# Patient Record
Sex: FEMALE | Race: WHITE | NOT HISPANIC OR LATINO | Employment: PART TIME | URBAN - METROPOLITAN AREA
[De-identification: names, ages, dates, MRNs, and addresses within clinical notes are randomized per-mention and may not be internally consistent; named-entity substitution may affect disease eponyms.]

---

## 2017-01-17 ENCOUNTER — OFFICE VISIT (OUTPATIENT)
Dept: FAMILY MEDICINE | Facility: CLINIC | Age: 62
End: 2017-01-17
Payer: COMMERCIAL

## 2017-01-17 VITALS
DIASTOLIC BLOOD PRESSURE: 72 MMHG | SYSTOLIC BLOOD PRESSURE: 126 MMHG | HEIGHT: 61 IN | HEART RATE: 72 BPM | TEMPERATURE: 98.6 F | BODY MASS INDEX: 45.12 KG/M2 | WEIGHT: 239 LBS

## 2017-01-17 DIAGNOSIS — Z12.11 SCREEN FOR COLON CANCER: ICD-10-CM

## 2017-01-17 DIAGNOSIS — Z12.31 VISIT FOR SCREENING MAMMOGRAM: ICD-10-CM

## 2017-01-17 DIAGNOSIS — Z23 NEED FOR SHINGLES VACCINE: ICD-10-CM

## 2017-01-17 DIAGNOSIS — H93.92 PROBLEM OF LEFT EAR: Primary | ICD-10-CM

## 2017-01-17 DIAGNOSIS — Z11.59 NEED FOR HEPATITIS C SCREENING TEST: ICD-10-CM

## 2017-01-17 DIAGNOSIS — Z23 NEED FOR PROPHYLACTIC VACCINATION AND INOCULATION AGAINST INFLUENZA: ICD-10-CM

## 2017-01-17 PROCEDURE — 90686 IIV4 VACC NO PRSV 0.5 ML IM: CPT | Performed by: PHYSICIAN ASSISTANT

## 2017-01-17 PROCEDURE — 99213 OFFICE O/P EST LOW 20 MIN: CPT | Mod: 25 | Performed by: PHYSICIAN ASSISTANT

## 2017-01-17 PROCEDURE — 90471 IMMUNIZATION ADMIN: CPT | Performed by: PHYSICIAN ASSISTANT

## 2017-01-17 PROCEDURE — 86803 HEPATITIS C AB TEST: CPT | Performed by: PHYSICIAN ASSISTANT

## 2017-01-17 PROCEDURE — 36415 COLL VENOUS BLD VENIPUNCTURE: CPT | Performed by: PHYSICIAN ASSISTANT

## 2017-01-17 RX ORDER — FLUTICASONE PROPIONATE 50 MCG
1-2 SPRAY, SUSPENSION (ML) NASAL DAILY
Qty: 1 BOTTLE | Refills: 11 | Status: ON HOLD | OUTPATIENT
Start: 2017-01-17 | End: 2019-07-09

## 2017-01-17 NOTE — PATIENT INSTRUCTIONS
Mammogram as scheduled.  Call to schedule Colonoscopy.  Florecita or her team will be in touch with you with results.  Flu vaccine and shingles vaccine today.    1.  Nasal steroid 2 sprays each nostril 1 time daily  2.  Nasal saline irrigation 3 times per day  3.  Increase your fluids  4.  Steam inhalation 3-4 times per day may also be helpful.    GOPI Dewey, PA-C

## 2017-01-17 NOTE — PROGRESS NOTES
Injectable Influenza Immunization Documentation    1.  Is the person to be vaccinated sick today?  No    2. Does the person to be vaccinated have an allergy to eggs or to a component of the vaccine?  No    3. Has the person to be vaccinated today ever had a serious reaction to influenza vaccine in the past?  No    4. Has the person to be vaccinated ever had Guillain-Marianna syndrome?  No     Form completed by Marybel Jane MA

## 2017-01-17 NOTE — MR AVS SNAPSHOT
After Visit Summary   1/17/2017    Theodora Hamilton    MRN: [id number]           Patient Information     Date Of Birth          1955        Visit Information        Provider Department      1/17/2017 8:40 AM Florecita García PA-C [facility]        Today's Diagnoses     Problem of left ear    -  1     Screen for colon cancer         Visit for screening mammogram         Need for hepatitis C screening test         Need for prophylactic vaccination and inoculation against influenza         Need for shingles vaccine           Care Instructions    Mammogram as scheduled.  Call to schedule Colonoscopy.  Florecita or her team will be in touch with you with results.  Flu vaccine and shingles vaccine today.    1.  Nasal steroid 2 sprays each nostril 1 time daily  2.  Nasal saline irrigation 3 times per day  3.  Increase your fluids  4.  Steam inhalation 3-4 times per day may also be helpful.    GOPI Dewey PA-C            Follow-ups after your visit        Additional Services     GASTROENTEROLOGY ADULT REFERRAL +/- PROCEDURE       Coverage of these services is subject to the terms and limitations of your health insurance plan.  Please call  member services at your health plan with any benefit or coverage questions.  Any procedures must be performed at a [place] facility OR coordinated by your clinic's referral office.    PROCEDURE ONLY - COLONOSCOPY - Reason for procedure: Screening  FM[facility]: [facility] [place] [phone]   [email or web address]  Colonoscopy Diagnostic Referral - Special Risks for Colonoscopy Procedures:                  Your next 10 appointments already scheduled     Feb 06, 2017  9:30 AM   MA SCREENING DIGITAL BILATERAL with [id number]   [facility] ([facility])    [address] [place] [address] [phone]           Do not use any powder, lotion or deodorant under your arms or on  "your breast. If you do, we will ask you to remove it before your exam.  Wear comfortable, two-piece clothing.  If you have any allergies, tell your care team.  Bring any previous mammograms from other facilities or have them mailed to the breast center.              Future tests that were ordered for you today     Open Future Orders        Priority Expected Expires Ordered    MA SCREENING DIGITAL BILAT - Future  (s+30) Routine  1/17/2018 1/17/2017            Who to contact     If you have questions or need follow up information about today's clinic visit or your schedule please contact [facility] directly at [phone].  Normal or non-critical lab and imaging results will be communicated to you by [facility], letter or phone within 4 business days after the clinic has received the results. If you do not hear from us within 7 days, please contact the clinic through [employer] or phone. If you have a critical or abnormal lab result, we will notify you by phone as soon as possible.  Submit refill requests through [employer] app or call your pharmacy and they will forward the refill request to us. Please allow 3 business days for your refill to be completed.          Additional Information About Your Visit        [facility] Information     Context app gives you secure access to your electronic health record. If you see a primary care provider, you can also send messages to your care team and make appointments. If you have questions, please call your primary care clinic.  If you do not have a primary care provider, please call [phone] and they will assist you.        Care EveryWhere ID     This is your Care EveryWhere ID. This could be used by other organizations to access your [place] medical records  [id number]        Your Vitals Were     Pulse Temperature Height BMI (Body Mass Index)          72 98.6  F (37  C) (Oral) 5' 0.5\" (1.537 m) 45.89 kg/m2         Blood Pressure from Last 3 Encounters:   01/17/17 " 126/72   11/03/15 181/93   07/29/15 126/80    Weight from Last 3 Encounters:   01/17/17 239 lb (108.41 kg)   11/03/15 225 lb (102.059 kg)   08/12/15 224 lb (101.606 kg)              We Performed the Following     GASTROENTEROLOGY ADULT REFERRAL +/- PROCEDURE     Hepatitis C Screen Reflex to HCV RNA Quant and Genotype          Today's Medication Changes          These changes are accurate as of: 1/17/17  9:22 AM.  If you have any questions, ask your nurse or doctor.               Start taking these medicines.        Dose/Directions    fluticasone 50 MCG/ACT spray   Commonly known as:  FLONASE   Used for:  Problem of left ear   Started by:  Floreicta García PA-C        Dose:  1-2 spray   Spray 1-2 sprays into both nostrils daily   Quantity:  1 Bottle   Refills:  11       zoster vaccine live (PF) injection   Commonly known as:  ZOSTAVAX   Used for:  Need for shingles vaccine   Started by:  Florecita García PA-C        Dose:  1 each   Inject 0.65 mLs Subcutaneous once for 1 dose   Quantity:  0.65 mL   Refills:  0            Where to get your medicines      These medications were sent to [facility] - [address].  [address] [place] [address], [place] [id number]     Phone:  [phone]    - fluticasone 50 MCG/ACT spray  - zoster vaccine live (PF) injection             Primary Care Provider Office Phone # Fax #    aYne Jimenez [phone]       [place] [id number]        Thank you!     Thank you for choosing [facility]  for your care. Our goal is always to provide you with excellent care. Hearing back from our patients is one way we can continue to improve our services. Please take a few minutes to complete the written survey that you may receive in the mail after your visit with us. Thank you!             Your Updated Medication List - Protect others around you: Learn how to  safely use, store and throw away your medicines at www.disposemymeds.org.          This list is accurate as of: 1/17/17  9:22 AM.  Always use your most recent med list.                   Brand Name Dispense Instructions for use    fluticasone 50 MCG/ACT spray    FLONASE    1 Bottle    Spray 1-2 sprays into both nostrils daily       loratadine 10 MG tablet    CLARITIN    30 tablet    Take 1 tablet (10 mg) by mouth daily       omeprazole 20 MG tablet     30 tablet    Take 1 tablet (20 mg) by mouth daily Take 30-60 minutes before a meal.       zoster vaccine live (PF) injection    ZOSTAVAX    0.65 mL    Inject 0.65 mLs Subcutaneous once for 1 dose

## 2017-01-17 NOTE — PROGRESS NOTES
"  SUBJECTIVE:                                                    Theodora Hamilton is a 61 year old female who presents to clinic today for the following health issues:    Concern - ear problem     Onset: about six months     Description:   Patient noticed a clicking noise in her left ear about six months ago. It has been happening more frequently the last month.  Possible fullness, sensation  Can change intensity with pushing on external canal  No ringing, rather clicking and white nose.  No change after shower, or other water.  Not painful  Has had 2 colds this year, that have lingered.  Denies any change in her hearing.    Intensity: moderate    Progression of Symptoms:  worsening    Accompanying Signs & Symptoms:  none       Previous history of similar problem:   Wax in left ear about 25 years ago    Precipitating factors:   Worsened by: nothing    Alleviating factors:  Improved by: nothing       Therapies Tried and outcome: heat      -------------------------------------    Problem list and histories reviewed & adjusted, as indicated.  Additional history: as documented    ROS:  Constitutional, HEENT, cardiovascular, pulmonary, gi and gu systems are negative, except as otherwise noted.    OBJECTIVE:                                                    /72 mmHg  Pulse 72  Temp(Src) 98.6  F (37  C) (Oral)  Ht 5' 0.5\" (1.537 m)  Wt 239 lb (108.41 kg)  BMI 45.89 kg/m2  Body mass index is 45.89 kg/(m^2).  GENERAL: healthy, alert and no distress  EYES: Eyes grossly normal to inspection, PERRL and conjunctivae and sclerae normal  HENT: normal cephalic/atraumatic, both ears: clear effusion, abnormal light reflex,  Nasal turbinates inflamed, and mouth without ulcers or lesions, oropharynx clear and oral mucous membranes moist  NECK: no adenopathy, no asymmetry, masses, or scars and thyroid normal to palpation  RESP: lungs clear to auscultation - no rales, rhonchi or wheezes  CV: regular rate and rhythm, normal S1 " S2, no S3 or S4, no murmur, click or rub, no peripheral edema and peripheral pulses strong  MS: no gross musculoskeletal defects noted, no edema    Diagnostic Test Results:  none      ASSESSMENT/PLAN:                                                    1. Problem of left ear  Encourage steam inhalation, saline irrigation or nose drops, OTC decongestants and adequate hydration.  Below medication as directed with full discussion of risks, benefits, and possible adverse effects.  RTC if symptoms worsen or do not continue to improve as anticipated.  Pt understood and agreed with plan.  Would consider ENT at that time.  - fluticasone (FLONASE) 50 MCG/ACT spray; Spray 1-2 sprays into both nostrils daily  Dispense: 1 Bottle; Refill: 11    2. Screen for colon cancer  Pt prefers to have this at [place].  - GASTROENTEROLOGY ADULT REFERRAL +/- PROCEDURE    3. Visit for screening mammogram  - MA SCREENING DIGITAL BILAT - Future  (s+30); Future    4. Need for hepatitis C screening test  - Hepatitis C Screen Reflex to HCV RNA Quant and Genotype    5. Need for prophylactic vaccination and inoculation against influenza  - FLU VAC, SPLIT VIRUS IM > 3 YO (QUADRIVALENT) [26846]  - Vaccine Administration, Initial [28724]    6. Need for shingles vaccine  - zoster vaccine live, PF, (ZOSTAVAX) injection; Inject 0.65 mLs Subcutaneous once for 1 dose  Dispense: 0.65 mL; Refill: 0    Patient Instructions   Mammogram as scheduled.  Call to schedule Colonoscopy.  Florecita or her team will be in touch with you with results.  Flu vaccine and shingles vaccine today.    1.  Nasal steroid 2 sprays each nostril 1 time daily  2.  Nasal saline irrigation 3 times per day  3.  Increase your fluids  4.  Steam inhalation 3-4 times per day may also be helpful.    GOPI Dewey, PA-C              Florecita García PA-C  [facility]

## 2017-01-17 NOTE — NURSING NOTE
"Chief Complaint   Patient presents with     Ear Problem       Initial /72 mmHg  Pulse 72  Temp(Src) 98.6  F (37  C) (Oral)  Ht 5' 0.5\" (1.537 m)  Wt 239 lb (108.41 kg)  BMI 45.89 kg/m2 Estimated body mass index is 45.89 kg/(m^2) as calculated from the following:    Height as of this encounter: 5' 0.5\" (1.537 m).    Weight as of this encounter: 239 lb (108.41 kg).  BP completed using cuff size: jason Jane MA     "

## 2017-01-18 LAB — HCV AB SERPL QL IA: NORMAL

## 2017-02-06 ENCOUNTER — RADIANT APPOINTMENT (OUTPATIENT)
Dept: MAMMOGRAPHY | Facility: CLINIC | Age: 62
End: 2017-02-06
Payer: COMMERCIAL

## 2017-02-06 DIAGNOSIS — Z12.31 VISIT FOR SCREENING MAMMOGRAM: ICD-10-CM

## 2017-02-06 PROCEDURE — G0202 SCR MAMMO BI INCL CAD: HCPCS | Mod: TC

## 2017-02-15 ENCOUNTER — TRANSFERRED RECORDS (OUTPATIENT)
Dept: HEALTH INFORMATION MANAGEMENT | Facility: CLINIC | Age: 62
End: 2017-02-15

## 2017-02-19 ENCOUNTER — MYC MEDICAL ADVICE (OUTPATIENT)
Dept: FAMILY MEDICINE | Facility: CLINIC | Age: 62
End: 2017-02-19

## 2017-02-28 ENCOUNTER — OFFICE VISIT (OUTPATIENT)
Dept: FAMILY MEDICINE | Facility: CLINIC | Age: 62
End: 2017-02-28
Payer: COMMERCIAL

## 2017-02-28 VITALS
WEIGHT: 237 LBS | SYSTOLIC BLOOD PRESSURE: 110 MMHG | HEART RATE: 76 BPM | BODY MASS INDEX: 44.75 KG/M2 | TEMPERATURE: 98.3 F | HEIGHT: 61 IN | DIASTOLIC BLOOD PRESSURE: 74 MMHG

## 2017-02-28 DIAGNOSIS — H93.8X2 SENSATION OF FULLNESS IN LEFT EAR: Primary | ICD-10-CM

## 2017-02-28 PROCEDURE — 99213 OFFICE O/P EST LOW 20 MIN: CPT | Performed by: PHYSICIAN ASSISTANT

## 2017-02-28 RX ORDER — LORATADINE 10 MG/1
10 TABLET ORAL DAILY
Qty: 30 TABLET | Refills: 0 | Status: ON HOLD | OUTPATIENT
Start: 2017-02-28 | End: 2019-07-09

## 2017-02-28 NOTE — PROGRESS NOTES
"  SUBJECTIVE:                                                    Theodora Hamilton is a 62 year old female who presents to clinic today for the following health issues:    Patient is here to follow up with her left ear. She states that the clicking has gone away but it is more of a ringing now. She took flonase and nasal rinses for a while but quit both. She also tried sudafed, aleve, and ibuprofen.    Now is mild having pain/pressure in between eyes.  Can make clicking stop when pushes on ear.  Laying on heating pad seems to make it better.  -------------------------------------    Problem list and histories reviewed & adjusted, as indicated.  Additional history: as documented    Reviewed and updated as needed this visit by clinical staff       Reviewed and updated as needed this visit by Provider         ROS:  Constitutional, HEENT, cardiovascular, pulmonary, gi and gu systems are negative, except as otherwise noted.    OBJECTIVE:                                                    /74 (BP Location: Right arm, Cuff Size: Adult Large)  Pulse 76  Temp 98.3  F (36.8  C) (Oral)  Ht 5' 0.5\" (1.537 m)  Wt 237 lb (107.5 kg)  BMI 45.52 kg/m2  Body mass index is 45.52 kg/(m^2).  GENERAL: healthy, alert and no distress  EYES: Eyes grossly normal to inspection, PERRL and conjunctivae and sclerae normal  HENT: ear canals and TM's normal, nose and mouth without ulcers or lesions  NECK: no adenopathy, no asymmetry, masses, or scars and thyroid normal to palpation  RESP: lungs clear to auscultation - no rales, rhonchi or wheezes  CV: regular rate and rhythm, normal S1 S2, no S3 or S4, no murmur, click or rub, no peripheral edema and peripheral pulses strong    Diagnostic Test Results:  none      ASSESSMENT/PLAN:                                                    1. Sensation of fullness in left ear  No fever, severe sinus pain, drainage to suggest sinus infection.  Pt had not tried below medication, so recommended trial of " Claritin and continued rinses.  Referral to ENT provided if no change in 2-3 weeks.  - OTOLARYNGOLOGY REFERRAL  - loratadine (CLARITIN) 10 MG tablet; Take 1 tablet (10 mg) by mouth daily  Dispense: 30 tablet; Refill: 0    Patient Instructions   Claritin and rinses daily.  See ENT if not resolving in next few weeks.    GOPI Dewey, PA-C        Florecita García PA-C  [facility]

## 2017-02-28 NOTE — MR AVS SNAPSHOT
After Visit Summary   2/28/2017    Theodora Hamilton    MRN: [id number]           Patient Information     Date Of Birth          1955        Visit Information        Provider Department      2/28/2017 10:40 AM Florecita García PA-C [facility]        Today's Diagnoses     Sensation of fullness in left ear    -  1      Care Instructions    Claritin and rinses daily.  See ENT if not resolving in next few weeks.    GOPI Dewey PA-C          Follow-ups after your visit        Additional Services     OTOLARYNGOLOGY REFERRAL       Your provider has referred you to: FMG: [facility] [place] [phone]   [email or web address]    Please be aware that coverage of these services is subject to the terms and limitations of your health insurance plan.  Call member services at your health plan with any benefit or coverage questions.      Please bring the following with you to your appointment:    (1) Any X-Rays, CTs or MRIs which have been performed.  Contact the facility where they were done to arrange for  prior to your scheduled appointment.   (2) List of current medications  (3) This referral request   (4) Any documents/labs given to you for this referral                  Who to contact     If you have questions or need follow up information about today's clinic visit or your schedule please contact [facility] directly at [phone].  Normal or non-critical lab and imaging results will be communicated to you by MyChart, letter or phone within 4 business days after the clinic has received the results. If you do not hear from us within 7 days, please contact the clinic through MyChart or phone. If you have a critical or abnormal lab result, we will notify you by phone as soon as possible.  Submit refill requests through [employer] or call your pharmacy and they will forward the refill request to us. Please allow 3  "business days for your refill to be completed.          Additional Information About Your Visit        MyChart Information     [employer] gives you secure access to your electronic health record. If you see a primary care provider, you can also send messages to your care team and make appointments. If you have questions, please call your primary care clinic.  If you do not have a primary care provider, please call [phone] and they will assist you.        Care EveryWhere ID     This is your Care EveryWhere ID. This could be used by other organizations to access your [place] medical records  [id number]        Your Vitals Were     Pulse Temperature Height BMI (Body Mass Index)          76 98.3  F (36.8  C) (Oral) 5' 0.5\" (1.537 m) 45.52 kg/m2         Blood Pressure from Last 3 Encounters:   02/28/17 110/74   01/17/17 126/72   11/03/15 (!) 181/93    Weight from Last 3 Encounters:   02/28/17 237 lb (107.5 kg)   01/17/17 239 lb (108.4 kg)   11/03/15 225 lb (102.1 kg)              We Performed the Following     OTOLARYNGOLOGY REFERRAL          Where to get your medicines      These medications were sent to [id number] - [place] - [address] AT [facility] [address]  [address], [place] [address]     Phone:  [phone]     loratadine 10 MG tablet          Primary Care Provider Office Phone # Fax #    Florecita García PA-C [phone]       [facility] [address] [facility] [address]        Thank you!     Thank you for choosing [facility]  for your care. Our goal is always to provide you with excellent care. Hearing back from our patients is one way we can continue to improve our services. Please take a few minutes to complete the written survey that you may receive in the mail after your visit with us. Thank you!             Your Updated Medication List - Protect others around you: Learn how " to safely use, store and throw away your medicines at www.disposemymeds.org.          This list is accurate as of: 2/28/17 11:17 AM.  Always use your most recent med list.                   Brand Name Dispense Instructions for use    fluticasone 50 MCG/ACT spray    FLONASE    1 Bottle    Spray 1-2 sprays into both nostrils daily       loratadine 10 MG tablet    CLARITIN    30 tablet    Take 1 tablet (10 mg) by mouth daily       omeprazole 20 MG tablet     30 tablet    Take 1 tablet (20 mg) by mouth daily Take 30-60 minutes before a meal.

## 2017-02-28 NOTE — NURSING NOTE
"Chief Complaint   Patient presents with     Ear Problem       Initial /74 (BP Location: Right arm, Cuff Size: Adult Large)  Pulse 76  Temp 98.3  F (36.8  C) (Oral)  Ht 5' 0.5\" (1.537 m)  Wt 237 lb (107.5 kg)  BMI 45.52 kg/m2 Estimated body mass index is 45.52 kg/(m^2) as calculated from the following:    Height as of this encounter: 5' 0.5\" (1.537 m).    Weight as of this encounter: 237 lb (107.5 kg).  Medication Reconciliation: complete   Marybel Jane MA     "

## 2017-04-19 ENCOUNTER — OFFICE VISIT (OUTPATIENT)
Dept: OTOLARYNGOLOGY | Facility: CLINIC | Age: 62
End: 2017-04-19
Payer: COMMERCIAL

## 2017-04-19 ENCOUNTER — OFFICE VISIT (OUTPATIENT)
Dept: AUDIOLOGY | Facility: CLINIC | Age: 62
End: 2017-04-19
Payer: COMMERCIAL

## 2017-04-19 VITALS — HEIGHT: 61 IN | RESPIRATION RATE: 18 BRPM | WEIGHT: 241 LBS | BODY MASS INDEX: 45.5 KG/M2

## 2017-04-19 DIAGNOSIS — H93.19 TINNITUS, UNSPECIFIED LATERALITY: Primary | ICD-10-CM

## 2017-04-19 DIAGNOSIS — H90.3 SENSORINEURAL HEARING LOSS, BILATERAL: Primary | ICD-10-CM

## 2017-04-19 DIAGNOSIS — H93.12 LEFT-SIDED TINNITUS: ICD-10-CM

## 2017-04-19 PROCEDURE — 92556 SPEECH AUDIOMETRY COMPLETE: CPT | Performed by: AUDIOLOGIST

## 2017-04-19 PROCEDURE — 99203 OFFICE O/P NEW LOW 30 MIN: CPT | Performed by: OTOLARYNGOLOGY

## 2017-04-19 PROCEDURE — 92567 TYMPANOMETRY: CPT | Performed by: AUDIOLOGIST

## 2017-04-19 PROCEDURE — 92552 PURE TONE AUDIOMETRY AIR: CPT | Performed by: AUDIOLOGIST

## 2017-04-19 ASSESSMENT — PAIN SCALES - GENERAL: PAINLEVEL: NO PAIN (0)

## 2017-04-19 NOTE — MR AVS SNAPSHOT
After Visit Summary   4/19/2017    Theodora Hamilton    MRN: [id number]           Patient Information     Date Of Birth          1955        Visit Information        Provider Department      4/19/2017 8:00 AM Juaquin Villalba MD [facility]        Today's Diagnoses     Tinnitus, unspecified laterality    -  1      Care Instructions    General Scheduling Information  To schedule your CT/MRI scan, please contact [place] [facility] at [phone] [address] [place] [address]    To schedule your Surgery, please contact our Specialty Schedulers at [phone]    ENT Clinic Locations Clinic Hours Telephone Number     [facility]  [address]  [place] [address]   Tuesday:       8:00am -- 4:00pm    Wednesday:  8:00am - 4:00pm   To schedule an appointment with   Dr. Villalba,   please contact our   Specialty Scheduling Department at:     [phone]       [facility]  [address] [place] [address]   Friday:          8:00am - 4:00pm         Urgent Care Locations Clinic Hours Telephone Numbers     [facility]  [address] [place] [address]     Monday-Friday:     11:00pm - 9:00pm    Saturday-Sunday:  9:00am - 5:00pm   [phone]     [facility]  [address] [place] [address]     Monday-Friday:      5:00pm - 9:00pm     Saturday-Sunday:  9:00am - 5:00pm   [phone]             Follow-ups after your visit        Additional Services     AUDIOLOGY ADULT REFERRAL       Your provider has referred you to: [facility] [phone]   [email or web address]    Treatment:  Evaluation & Treatment  Specialty Testing:  Audiogram w/Tymps and Reflexes    Please be aware that coverage of these services is subject to the terms and limitations of your health insurance plan.  Call member services at your health plan with any benefit or coverage questions.      Please bring the  "following to your appointment:  >>   Any x-rays, CTs or MRIs which have been performed.  Contact the facility where they were done to arrange for  prior to your scheduled appointment.   >>   List of current medications  >>   This referral request   >>   Any documents/labs given to you for this referral                  Who to contact     If you have questions or need follow up information about today's clinic visit or your schedule please contact [facility] directly at [phone].  Normal or non-critical lab and imaging results will be communicated to you by Attensityhart, letter or phone within 4 business days after the clinic has received the results. If you do not hear from us within 7 days, please contact the clinic through Care ITt or phone. If you have a critical or abnormal lab result, we will notify you by phone as soon as possible.  Submit refill requests through Coiney or call your pharmacy and they will forward the refill request to us. Please allow 3 business days for your refill to be completed.          Additional Information About Your Visit        Attensityhart Information     Coiney gives you secure access to your electronic health record. If you see a primary care provider, you can also send messages to your care team and make appointments. If you have questions, please call your primary care clinic.  If you do not have a primary care provider, please call [phone] and they will assist you.        Care EveryWhere ID     This is your Care EveryWhere ID. This could be used by other organizations to access your [facility] medical records  [id number]        Your Vitals Were     Respirations Height BMI (Body Mass Index)             18 1.537 m (5' 0.5\") 46.29 kg/m2          Blood Pressure from Last 3 Encounters:   02/28/17 110/74   01/17/17 126/72   11/03/15 (!) 181/93    Weight from Last 3 Encounters:   04/19/17 109.3 kg (241 lb)   02/28/17 107.5 kg (237 lb)   01/17/17 108.4 kg (239 lb) "              We Performed the Following     AUDIOLOGY ADULT REFERRAL        Primary Care Provider Office Phone # Fax #    Florecita García PA-C [phone]       [address] [address]        Thank you!     Thank you for choosing [facility]  for your care. Our goal is always to provide you with excellent care. Hearing back from our patients is one way we can continue to improve our services. Please take a few minutes to complete the written survey that you may receive in the mail after your visit with us. Thank you!             Your Updated Medication List - Protect others around you: Learn how to safely use, store and throw away your medicines at www.disposemymeds.org.          This list is accurate as of: 4/19/17  5:13 PM.  Always use your most recent med list.                   Brand Name Dispense Instructions for use    fluticasone 50 MCG/ACT spray    FLONASE    1 Bottle    Spray 1-2 sprays into both nostrils daily       loratadine 10 MG tablet    CLARITIN    30 tablet    Take 1 tablet (10 mg) by mouth daily       omeprazole 20 MG tablet     30 tablet    Take 1 tablet (20 mg) by mouth daily Take 30-60 minutes before a meal.

## 2017-04-19 NOTE — PATIENT INSTRUCTIONS
General Scheduling Information  To schedule your CT/MRI scan, please contact [place] [facility] at [phone]   [address] [place] [address]    To schedule your Surgery, please contact our Specialty Schedulers at [phone]    ENT Clinic Locations Clinic Hours Telephone Number     [facility]  [address] [place] [address]   Tuesday:       8:00am -- 4:00pm    Wednesday:  8:00am - 4:00pm   To schedule an appointment with   Dr. Villalba,   please contact our   Specialty Scheduling Department at:     [phone]       [facility]  [address] [place] [address]   Friday:          8:00am - 4:00pm         Urgent Care Locations Clinic Hours Telephone Numbers     [facility]  [address] [place] [address]     Monday-Friday:     11:00pm - 9:00pm    Saturday-Sunday:  9:00am - 5:00pm   [phone]     [facility]  [address] [place] [address]     Monday-Friday:      5:00pm - 9:00pm     Saturday-Sunday:  9:00am - 5:00pm   [phone]

## 2017-04-19 NOTE — MR AVS SNAPSHOT
After Visit Summary   4/19/2017    Theodora Hamilton    MRN: [id number]           Patient Information     Date Of Birth          1955        Visit Information        Provider Department      4/19/2017 7:30 AM Cezar Mcfarland AuD [facility]        Today's Diagnoses     Sensorineural hearing loss, bilateral    -  1    Left-sided tinnitus           Follow-ups after your visit        Your next 10 appointments already scheduled     Apr 19, 2017  8:00 AM CDT   New Visit with Juaquin Villalba MD   [facility] ([facility])    [address] [place] [address]   [phone]              Who to contact     If you have questions or need follow up information about today's clinic visit or your schedule please contact [facility] directly at [phone].  Normal or non-critical lab and imaging results will be communicated to you by MyChart, letter or phone within 4 business days after the clinic has received the results. If you do not hear from us within 7 days, please contact the clinic through MyChart or phone. If you have a critical or abnormal lab result, we will notify you by phone as soon as possible.  Submit refill requests through PaeDae or call your pharmacy and they will forward the refill request to us. Please allow 3 business days for your refill to be completed.          Additional Information About Your Visit        MyChart Information     PaeDae gives you secure access to your electronic health record. If you see a primary care provider, you can also send messages to your care team and make appointments. If you have questions, please call your primary care clinic.  If you do not have a primary care provider, please call [phone] and they will assist you.        Care EveryWhere ID     This is your Care EveryWhere ID. This could be used by other organizations to access your [facility] medical records  [id number]          Blood Pressure from Last 3 Encounters:   02/28/17 110/74   01/17/17 126/72   11/03/15 (!) 181/93    Weight from Last 3 Encounters:   02/28/17 237 lb (107.5 kg)   01/17/17 239 lb (108.4 kg)   11/03/15 225 lb (102.1 kg)              We Performed the Following     AUDIOGRAM/TYMPANOGRAM - INTERFACE     PURE TONE AUDIOMETRY, AIR     SPEECH AUDIOMETRY, COMPLETE     TYMPANOMETRY        Primary Care Provider Office Phone # Fax #    Florecita García PA-C [phone]       [address] [address]        Thank you!     Thank you for choosing [facility]  for your care. Our goal is always to provide you with excellent care. Hearing back from our patients is one way we can continue to improve our services. Please take a few minutes to complete the written survey that you may receive in the mail after your visit with us. Thank you!             Your Updated Medication List - Protect others around you: Learn how to safely use, store and throw away your medicines at www.disposemymeds.org.          This list is accurate as of: 4/19/17  7:45 AM.  Always use your most recent med list.                   Brand Name Dispense Instructions for use    fluticasone 50 MCG/ACT spray    FLONASE    1 Bottle    Spray 1-2 sprays into both nostrils daily       loratadine 10 MG tablet    CLARITIN    30 tablet    Take 1 tablet (10 mg) by mouth daily       omeprazole 20 MG tablet     30 tablet    Take 1 tablet (20 mg) by mouth daily Take 30-60 minutes before a meal.

## 2017-04-19 NOTE — NURSING NOTE
"Chief Complaint   Patient presents with     Tinnitus       Initial Resp 18  Ht 1.537 m (5' 0.5\")  Wt 109.3 kg (241 lb)  BMI 46.29 kg/m2 Estimated body mass index is 46.29 kg/(m^2) as calculated from the following:    Height as of this encounter: 1.537 m (5' 0.5\").    Weight as of this encounter: 109.3 kg (241 lb).  Medication Reconciliation: complete     Yoon French MA    "

## 2017-04-19 NOTE — PROGRESS NOTES
Patient was referred to Audiology from ENT by Dr. Villalba for a hearing examination. Patient reports recent onset of 'clicking' in her left ear only for the past 3-4 months.     Testing:    Otoscopy:   Clear canals free of cerumen bilaterally.     Tympanograms:   Tympanometric findings were Normal ear canal volume and compliance (Type A) bilaterally.     Thresholds:   Puretone thresholds obtained with insert earphones show normal hearing sensitivity through 6000 Hz then a mild sensorineural hearing loss at 8000 Hz only bilaterally (see scanned audiogram). Speech reception thresholds were in agreement with puretone findings bilaterally. Speech discrimination scores were excellent bilaterally (Right: 100%; Left: 100%).     Discussed results with the patient.     Patient was returned to ENT for follow up.     Cezar Engle CCC-A  Licensed Audiologist  4/19/2017

## 2017-04-19 NOTE — PROGRESS NOTES
Chief Complaint - Tinnitus    History of Present Illness - Theodora Hamilton is a 62 year old female who presents to me today with a clicking in left ear.  It has been present and noticeable for approximately 1 year. It comes and goes. Overall, her hearing seems okay. She doesn't feel a movement in ear. There is no history of chronic ear disease or ear surgery. She feels she can push in front of her ear and this will stop it. It isn't worse when it is quiet. With regards to recreational, , and work-related noise exposure she doesn't have much. No regular use of aspirin or NSAIDS. She tried NSAIDS to help this, but this didn't help. Also tried decongestants, allergy medications, but these didn't help.     Past Medical History -   Patient Active Problem List   Diagnosis     Asymptomatic postmenopausal status     OA (Osteoarthritis) - left great toe     HYPERLIPIDEMIA LDL GOAL <160     Obesity       Current Medications -   Current Outpatient Prescriptions:      loratadine (CLARITIN) 10 MG tablet, Take 1 tablet (10 mg) by mouth daily, Disp: 30 tablet, Rfl: 0     fluticasone (FLONASE) 50 MCG/ACT spray, Spray 1-2 sprays into both nostrils daily, Disp: 1 Bottle, Rfl: 11     omeprazole 20 MG tablet, Take 1 tablet (20 mg) by mouth daily Take 30-60 minutes before a meal., Disp: 30 tablet, Rfl: 1    Allergies -   Allergies   Allergen Reactions     Amoxicillin Hives       Social History -   Social History     Social History     Marital status: Single     Spouse name: N/A     Number of children: 0     Years of education: N/A     Occupational History      [employer]     Social History Main Topics     Smoking status: Former Smoker     Types: Cigarettes     Smokeless tobacco: Never Used     Alcohol use Yes      Comment: 5 times per year     Drug use: No     Sexual activity: Yes     Partners: Female     Other Topics Concern     Parent/Sibling W/ Cabg, Mi Or Angioplasty Before 65f 55m? No     father     Social  "History Narrative       Family History -   Family History   Problem Relation Age of Onset     C.A.D. Maternal Grandmother      C.A.D. Maternal Grandfather      17 MI's per history     C.A.D. Paternal Grandmother      DIABETES Paternal Grandmother      C.A.D. Paternal Grandfather      DIABETES Paternal Grandfather      Hypertension Mother      C.A.D. Mother      pacemaker     Circulatory Father      Carotid artery stenosis     Respiratory Father 75     COPD     DIABETES Paternal Aunt      DIABETES Paternal Uncle      Asthma No family hx of      CEREBROVASCULAR DISEASE No family hx of      Breast Cancer No family hx of      Cancer - colorectal No family hx of      Prostate Cancer No family hx of        Review of Systems - As per HPI and PMHx, otherwise 7 system review of the head and neck negative.    Physical Exam  Resp 18  Ht 1.537 m (5' 0.5\")  Wt 109.3 kg (241 lb)  BMI 46.29 kg/m2  General - The patient is in no distress. Alert and oriented to person and place, answers questions and cooperates with examination appropriately.   Neurologic - CN II-XII are grossly intact. No focal neurologic deficits.   Voice and Breathing - The patient was breathing comfortably without the use of accessory muscles. There was no wheezing, stridor, or stertor.  The patients voice was clear and strong.  Ears - The tympanic membranes are normal in appearance, bony landmarks are intact.  No retraction, perforation, or masses. No fluid or purulence was seen in the external canal or the middle ear. No evidence of infection of the middle ear or external canal, cerumen was normal in appearance. i tried to clear some cerumen with hairs in the medial canal, but this didn't help her.  Eyes - Extraocular movements intact, and the pupils were reactive to light.  Sclera were not icteric or injected, conjunctiva were pink and moist.  Mouth - Examination of the oral cavity showed pink, healthy oral mucosa. No lesions or ulcerations noted.  The " tongue was mobile and midline.  Throat - The walls of the oropharynx were smooth, symmetric, and had no lesions or ulcerations.  The uvula was midline on elevation.    Neck - Palpation of the occipital, submental, submandibular, internal jugular chain, and supraclavicular nodes did not demonstrate any abnormal lymph nodes or masses. No parotid masses. Palpation of the thyroid was soft and smooth, with no nodules or goiter appreciated.  The trachea was mobile and midline.      Audiologic Studies - An audiogram and tympanogram were performed today as part of the evaluation and personally reviewed. The tympanogram shows a normal Type A curve, with normal canal volume and middle ear pressure.  There is no sign of eustachian tube dysfunction or middle ear effusion.  The audiogram showed a significant symmetric high frequency sensorineural hearing loss.  There was no evidence of conductive hearing loss or significant asymmetry.      Assessment and Plan - Theodora Hamilton is a 62 year old female who presents to me today with intermittent clicking in the left ear. It could be tensor or stapedial myoclonus or classic tinnitus. i reassured her i'm not worried about anything serious. She can try less caffeine, more sleep, less anxiety, and trying to clean the ears with debrox. If things worsen she should return.     Juaquin Villalba MD  Otolaryngology  [facility]

## 2017-07-22 ENCOUNTER — HEALTH MAINTENANCE LETTER (OUTPATIENT)
Age: 62
End: 2017-07-22

## 2017-12-02 ENCOUNTER — HEALTH MAINTENANCE LETTER (OUTPATIENT)
Age: 62
End: 2017-12-02

## 2017-12-27 ENCOUNTER — TRANSFERRED RECORDS (OUTPATIENT)
Dept: HEALTH INFORMATION MANAGEMENT | Facility: CLINIC | Age: 62
End: 2017-12-27

## 2019-05-01 ENCOUNTER — TRANSFERRED RECORDS (OUTPATIENT)
Dept: HEALTH INFORMATION MANAGEMENT | Facility: CLINIC | Age: 64
End: 2019-05-01

## 2019-07-09 ENCOUNTER — ANESTHESIA (OUTPATIENT)
Dept: SURGERY | Facility: CLINIC | Age: 64
DRG: 513 | End: 2019-07-09
Payer: COMMERCIAL

## 2019-07-09 ENCOUNTER — HOSPITAL ENCOUNTER (INPATIENT)
Facility: CLINIC | Age: 64
LOS: 2 days | Discharge: HOME | DRG: 513 | End: 2019-07-11
Attending: EMERGENCY MEDICINE | Admitting: ORTHOPAEDIC SURGERY
Payer: COMMERCIAL

## 2019-07-09 ENCOUNTER — ANESTHESIA EVENT (OUTPATIENT)
Dept: SURGERY | Facility: CLINIC | Age: 64
DRG: 513 | End: 2019-07-09
Payer: COMMERCIAL

## 2019-07-09 ENCOUNTER — APPOINTMENT (OUTPATIENT)
Dept: GENERAL RADIOLOGY | Facility: CLINIC | Age: 64
DRG: 513 | End: 2019-07-09
Payer: COMMERCIAL

## 2019-07-09 DIAGNOSIS — M65.949 FLEXOR TENOSYNOVITIS OF FINGER: ICD-10-CM

## 2019-07-09 DIAGNOSIS — M79.89 SWELLING OF LEFT INDEX FINGER: ICD-10-CM

## 2019-07-09 LAB
ANION GAP SERPL CALCULATED.3IONS-SCNC: 7 MMOL/L (ref 3–14)
BASOPHILS # BLD AUTO: 0 10E9/L (ref 0–0.2)
BASOPHILS NFR BLD AUTO: 0.6 %
BUN SERPL-MCNC: 17 MG/DL (ref 7–30)
CALCIUM SERPL-MCNC: 7.9 MG/DL (ref 8.5–10.1)
CHLORIDE SERPL-SCNC: 114 MMOL/L (ref 94–109)
CO2 SERPL-SCNC: 21 MMOL/L (ref 20–32)
CREAT SERPL-MCNC: 0.86 MG/DL (ref 0.52–1.04)
CRP SERPL-MCNC: 27 MG/L (ref 0–8)
DIFFERENTIAL METHOD BLD: ABNORMAL
EOSINOPHIL # BLD AUTO: 0.1 10E9/L (ref 0–0.7)
EOSINOPHIL NFR BLD AUTO: 1.6 %
ERYTHROCYTE [DISTWIDTH] IN BLOOD BY AUTOMATED COUNT: 14 % (ref 10–15)
ERYTHROCYTE [SEDIMENTATION RATE] IN BLOOD BY WESTERGREN METHOD: 19 MM/H (ref 0–30)
GFR SERPL CREATININE-BSD FRML MDRD: 71 ML/MIN/{1.73_M2}
GLUCOSE BLDC GLUCOMTR-MCNC: 78 MG/DL (ref 70–99)
GLUCOSE SERPL-MCNC: 124 MG/DL (ref 70–99)
HCT VFR BLD AUTO: 43.7 % (ref 35–47)
HGB BLD-MCNC: 13.5 G/DL (ref 11.7–15.7)
IMM GRANULOCYTES # BLD: 0 10E9/L (ref 0–0.4)
IMM GRANULOCYTES NFR BLD: 0.3 %
LYMPHOCYTES # BLD AUTO: 1.3 10E9/L (ref 0.8–5.3)
LYMPHOCYTES NFR BLD AUTO: 18.2 %
MCH RBC QN AUTO: 28.7 PG (ref 26.5–33)
MCHC RBC AUTO-ENTMCNC: 30.9 G/DL (ref 31.5–36.5)
MCV RBC AUTO: 93 FL (ref 78–100)
MONOCYTES # BLD AUTO: 0.5 10E9/L (ref 0–1.3)
MONOCYTES NFR BLD AUTO: 7.5 %
NEUTROPHILS # BLD AUTO: 5.1 10E9/L (ref 1.6–8.3)
NEUTROPHILS NFR BLD AUTO: 71.8 %
NRBC # BLD AUTO: 0 10*3/UL
NRBC BLD AUTO-RTO: 0 /100
PLATELET # BLD AUTO: 218 10E9/L (ref 150–450)
POTASSIUM SERPL-SCNC: 3.1 MMOL/L (ref 3.4–5.3)
RBC # BLD AUTO: 4.71 10E12/L (ref 3.8–5.2)
SODIUM SERPL-SCNC: 142 MMOL/L (ref 133–144)
WBC # BLD AUTO: 7.1 10E9/L (ref 4–11)

## 2019-07-09 PROCEDURE — 85025 COMPLETE CBC W/AUTO DIFF WBC: CPT | Performed by: EMERGENCY MEDICINE

## 2019-07-09 PROCEDURE — 25000132 ZZH RX MED GY IP 250 OP 250 PS 637: Performed by: STUDENT IN AN ORGANIZED HEALTH CARE EDUCATION/TRAINING PROGRAM

## 2019-07-09 PROCEDURE — 25000125 ZZHC RX 250: Performed by: NURSE ANESTHETIST, CERTIFIED REGISTERED

## 2019-07-09 PROCEDURE — 87040 BLOOD CULTURE FOR BACTERIA: CPT | Performed by: EMERGENCY MEDICINE

## 2019-07-09 PROCEDURE — 12000001 ZZH R&B MED SURG/OB UMMC

## 2019-07-09 PROCEDURE — 25000125 ZZHC RX 250

## 2019-07-09 PROCEDURE — 37000008 ZZH ANESTHESIA TECHNICAL FEE, 1ST 30 MIN: Performed by: ORTHOPAEDIC SURGERY

## 2019-07-09 PROCEDURE — 76882 US LMTD JT/FCL EVL NVASC XTR: CPT | Mod: 26 | Performed by: EMERGENCY MEDICINE

## 2019-07-09 PROCEDURE — 73130 X-RAY EXAM OF HAND: CPT | Mod: LT

## 2019-07-09 PROCEDURE — 0LB80ZZ EXCISION OF LEFT HAND TENDON, OPEN APPROACH: ICD-10-PCS | Performed by: ORTHOPAEDIC SURGERY

## 2019-07-09 PROCEDURE — 80048 BASIC METABOLIC PNL TOTAL CA: CPT | Performed by: EMERGENCY MEDICINE

## 2019-07-09 PROCEDURE — 25000128 H RX IP 250 OP 636

## 2019-07-09 PROCEDURE — 25000128 H RX IP 250 OP 636: Performed by: EMERGENCY MEDICINE

## 2019-07-09 PROCEDURE — 96375 TX/PRO/DX INJ NEW DRUG ADDON: CPT | Performed by: EMERGENCY MEDICINE

## 2019-07-09 PROCEDURE — 25000125 ZZHC RX 250: Performed by: STUDENT IN AN ORGANIZED HEALTH CARE EDUCATION/TRAINING PROGRAM

## 2019-07-09 PROCEDURE — 99284 EMERGENCY DEPT VISIT MOD MDM: CPT | Mod: 25 | Performed by: EMERGENCY MEDICINE

## 2019-07-09 PROCEDURE — 25000128 H RX IP 250 OP 636: Performed by: ORTHOPAEDIC SURGERY

## 2019-07-09 PROCEDURE — 36000059 ZZH SURGERY LEVEL 3 EA 15 ADDTL MIN UMMC: Performed by: ORTHOPAEDIC SURGERY

## 2019-07-09 PROCEDURE — 86140 C-REACTIVE PROTEIN: CPT | Performed by: STUDENT IN AN ORGANIZED HEALTH CARE EDUCATION/TRAINING PROGRAM

## 2019-07-09 PROCEDURE — 25800030 ZZH RX IP 258 OP 636

## 2019-07-09 PROCEDURE — 87070 CULTURE OTHR SPECIMN AEROBIC: CPT | Performed by: ORTHOPAEDIC SURGERY

## 2019-07-09 PROCEDURE — 87075 CULTR BACTERIA EXCEPT BLOOD: CPT | Performed by: ORTHOPAEDIC SURGERY

## 2019-07-09 PROCEDURE — 37000009 ZZH ANESTHESIA TECHNICAL FEE, EACH ADDTL 15 MIN: Performed by: ORTHOPAEDIC SURGERY

## 2019-07-09 PROCEDURE — 40000170 ZZH STATISTIC PRE-PROCEDURE ASSESSMENT II: Performed by: ORTHOPAEDIC SURGERY

## 2019-07-09 PROCEDURE — 96366 THER/PROPH/DIAG IV INF ADDON: CPT | Performed by: EMERGENCY MEDICINE

## 2019-07-09 PROCEDURE — 96365 THER/PROPH/DIAG IV INF INIT: CPT | Performed by: EMERGENCY MEDICINE

## 2019-07-09 PROCEDURE — 25000566 ZZH SEVOFLURANE, EA 15 MIN: Performed by: ORTHOPAEDIC SURGERY

## 2019-07-09 PROCEDURE — 25000128 H RX IP 250 OP 636: Performed by: STUDENT IN AN ORGANIZED HEALTH CARE EDUCATION/TRAINING PROGRAM

## 2019-07-09 PROCEDURE — 00000146 ZZHCL STATISTIC GLUCOSE BY METER IP

## 2019-07-09 PROCEDURE — 36000057 ZZH SURGERY LEVEL 3 1ST 30 MIN - UMMC: Performed by: ORTHOPAEDIC SURGERY

## 2019-07-09 PROCEDURE — 71000014 ZZH RECOVERY PHASE 1 LEVEL 2 FIRST HR: Performed by: ORTHOPAEDIC SURGERY

## 2019-07-09 PROCEDURE — 25000128 H RX IP 250 OP 636: Performed by: NURSE ANESTHETIST, CERTIFIED REGISTERED

## 2019-07-09 PROCEDURE — 27210794 ZZH OR GENERAL SUPPLY STERILE: Performed by: ORTHOPAEDIC SURGERY

## 2019-07-09 PROCEDURE — 96367 TX/PROPH/DG ADDL SEQ IV INF: CPT | Performed by: EMERGENCY MEDICINE

## 2019-07-09 PROCEDURE — 85652 RBC SED RATE AUTOMATED: CPT | Performed by: STUDENT IN AN ORGANIZED HEALTH CARE EDUCATION/TRAINING PROGRAM

## 2019-07-09 PROCEDURE — 76882 US LMTD JT/FCL EVL NVASC XTR: CPT | Performed by: EMERGENCY MEDICINE

## 2019-07-09 PROCEDURE — 25000132 ZZH RX MED GY IP 250 OP 250 PS 637: Performed by: PHYSICIAN ASSISTANT

## 2019-07-09 PROCEDURE — 99285 EMERGENCY DEPT VISIT HI MDM: CPT | Mod: 25 | Performed by: EMERGENCY MEDICINE

## 2019-07-09 RX ORDER — CEFTRIAXONE 1 G/1
1 INJECTION, POWDER, FOR SOLUTION INTRAMUSCULAR; INTRAVENOUS ONCE
Status: COMPLETED | OUTPATIENT
Start: 2019-07-09 | End: 2019-07-09

## 2019-07-09 RX ORDER — IBUPROFEN 600 MG/1
600 TABLET, FILM COATED ORAL EVERY 6 HOURS PRN
Status: DISCONTINUED | OUTPATIENT
Start: 2019-07-09 | End: 2019-07-11 | Stop reason: HOSPADM

## 2019-07-09 RX ORDER — BUPIVACAINE HYDROCHLORIDE 5 MG/ML
INJECTION, SOLUTION PERINEURAL PRN
Status: DISCONTINUED | OUTPATIENT
Start: 2019-07-09 | End: 2019-07-09 | Stop reason: HOSPADM

## 2019-07-09 RX ORDER — NALOXONE HYDROCHLORIDE 0.4 MG/ML
.1-.4 INJECTION, SOLUTION INTRAMUSCULAR; INTRAVENOUS; SUBCUTANEOUS
Status: DISCONTINUED | OUTPATIENT
Start: 2019-07-09 | End: 2019-07-11 | Stop reason: HOSPADM

## 2019-07-09 RX ORDER — LORATADINE 10 MG/1
10 TABLET ORAL DAILY
Status: DISCONTINUED | OUTPATIENT
Start: 2019-07-09 | End: 2019-07-11 | Stop reason: HOSPADM

## 2019-07-09 RX ORDER — CLINDAMYCIN PHOSPHATE 900 MG/50ML
INJECTION, SOLUTION INTRAVENOUS PRN
Status: DISCONTINUED | OUTPATIENT
Start: 2019-07-09 | End: 2019-07-09

## 2019-07-09 RX ORDER — MAGNESIUM HYDROXIDE/ALUMINUM HYDROXICE/SIMETHICONE 120; 1200; 1200 MG/30ML; MG/30ML; MG/30ML
30 SUSPENSION ORAL
COMMUNITY

## 2019-07-09 RX ORDER — CEFTRIAXONE 1 G/1
1 INJECTION, POWDER, FOR SOLUTION INTRAMUSCULAR; INTRAVENOUS
Status: DISCONTINUED | OUTPATIENT
Start: 2019-07-09 | End: 2019-07-11 | Stop reason: HOSPADM

## 2019-07-09 RX ORDER — CLINDAMYCIN HCL 150 MG
150 CAPSULE ORAL 3 TIMES DAILY
Status: ON HOLD | COMMUNITY
Start: 2019-07-08 | End: 2019-07-11

## 2019-07-09 RX ORDER — KETOROLAC TROMETHAMINE 15 MG/ML
15 INJECTION, SOLUTION INTRAMUSCULAR; INTRAVENOUS ONCE
Status: COMPLETED | OUTPATIENT
Start: 2019-07-09 | End: 2019-07-09

## 2019-07-09 RX ORDER — FENTANYL CITRATE 50 UG/ML
INJECTION, SOLUTION INTRAMUSCULAR; INTRAVENOUS PRN
Status: DISCONTINUED | OUTPATIENT
Start: 2019-07-09 | End: 2019-07-09

## 2019-07-09 RX ORDER — SODIUM CHLORIDE, SODIUM LACTATE, POTASSIUM CHLORIDE, CALCIUM CHLORIDE 600; 310; 30; 20 MG/100ML; MG/100ML; MG/100ML; MG/100ML
INJECTION, SOLUTION INTRAVENOUS CONTINUOUS PRN
Status: DISCONTINUED | OUTPATIENT
Start: 2019-07-09 | End: 2019-07-09

## 2019-07-09 RX ORDER — CEFTRIAXONE 1 G/1
1 INJECTION, POWDER, FOR SOLUTION INTRAMUSCULAR; INTRAVENOUS EVERY 12 HOURS
Status: DISCONTINUED | OUTPATIENT
Start: 2019-07-09 | End: 2019-07-09

## 2019-07-09 RX ORDER — ONDANSETRON 4 MG/1
4 TABLET, ORALLY DISINTEGRATING ORAL EVERY 6 HOURS PRN
Status: DISCONTINUED | OUTPATIENT
Start: 2019-07-09 | End: 2019-07-11 | Stop reason: HOSPADM

## 2019-07-09 RX ORDER — FENTANYL CITRATE 50 UG/ML
25-50 INJECTION, SOLUTION INTRAMUSCULAR; INTRAVENOUS
Status: DISCONTINUED | OUTPATIENT
Start: 2019-07-09 | End: 2019-07-09 | Stop reason: HOSPADM

## 2019-07-09 RX ORDER — AMOXICILLIN 250 MG
1-2 CAPSULE ORAL 2 TIMES DAILY
Status: DISCONTINUED | OUTPATIENT
Start: 2019-07-09 | End: 2019-07-11 | Stop reason: HOSPADM

## 2019-07-09 RX ORDER — SODIUM CHLORIDE, SODIUM LACTATE, POTASSIUM CHLORIDE, CALCIUM CHLORIDE 600; 310; 30; 20 MG/100ML; MG/100ML; MG/100ML; MG/100ML
INJECTION, SOLUTION INTRAVENOUS CONTINUOUS
Status: DISCONTINUED | OUTPATIENT
Start: 2019-07-09 | End: 2019-07-09 | Stop reason: HOSPADM

## 2019-07-09 RX ORDER — CLINDAMYCIN PHOSPHATE 900 MG/50ML
900 INJECTION, SOLUTION INTRAVENOUS
Status: DISCONTINUED | OUTPATIENT
Start: 2019-07-09 | End: 2019-07-09 | Stop reason: HOSPADM

## 2019-07-09 RX ORDER — PROCHLORPERAZINE MALEATE 5 MG
5 TABLET ORAL EVERY 6 HOURS PRN
Status: DISCONTINUED | OUTPATIENT
Start: 2019-07-09 | End: 2019-07-11 | Stop reason: HOSPADM

## 2019-07-09 RX ORDER — DOXYCYCLINE 100 MG/1
100 CAPSULE ORAL 2 TIMES DAILY
Status: ON HOLD | COMMUNITY
Start: 2019-07-08 | End: 2019-07-11

## 2019-07-09 RX ORDER — SODIUM CHLORIDE 9 MG/ML
INJECTION, SOLUTION INTRAVENOUS
Status: DISPENSED
Start: 2019-07-09 | End: 2019-07-10

## 2019-07-09 RX ORDER — ONDANSETRON 2 MG/ML
4 INJECTION INTRAMUSCULAR; INTRAVENOUS EVERY 6 HOURS PRN
Status: DISCONTINUED | OUTPATIENT
Start: 2019-07-09 | End: 2019-07-11 | Stop reason: HOSPADM

## 2019-07-09 RX ORDER — ONDANSETRON 4 MG/1
4 TABLET, ORALLY DISINTEGRATING ORAL EVERY 30 MIN PRN
Status: DISCONTINUED | OUTPATIENT
Start: 2019-07-09 | End: 2019-07-09 | Stop reason: HOSPADM

## 2019-07-09 RX ORDER — LIDOCAINE HYDROCHLORIDE 10 MG/ML
INJECTION, SOLUTION EPIDURAL; INFILTRATION; INTRACAUDAL; PERINEURAL
Status: DISCONTINUED
Start: 2019-07-09 | End: 2019-07-09 | Stop reason: HOSPADM

## 2019-07-09 RX ORDER — LIDOCAINE HYDROCHLORIDE 20 MG/ML
INJECTION, SOLUTION INFILTRATION; PERINEURAL PRN
Status: DISCONTINUED | OUTPATIENT
Start: 2019-07-09 | End: 2019-07-09

## 2019-07-09 RX ORDER — LIDOCAINE 40 MG/G
CREAM TOPICAL
Status: DISCONTINUED | OUTPATIENT
Start: 2019-07-09 | End: 2019-07-09 | Stop reason: HOSPADM

## 2019-07-09 RX ORDER — ACETAMINOPHEN 325 MG/1
650 TABLET ORAL EVERY 4 HOURS PRN
Status: DISCONTINUED | OUTPATIENT
Start: 2019-07-09 | End: 2019-07-11 | Stop reason: HOSPADM

## 2019-07-09 RX ORDER — MEPERIDINE HYDROCHLORIDE 25 MG/ML
12.5 INJECTION INTRAMUSCULAR; INTRAVENOUS; SUBCUTANEOUS
Status: DISCONTINUED | OUTPATIENT
Start: 2019-07-09 | End: 2019-07-09 | Stop reason: HOSPADM

## 2019-07-09 RX ORDER — CLINDAMYCIN PHOSPHATE 900 MG/50ML
900 INJECTION, SOLUTION INTRAVENOUS SEE ADMIN INSTRUCTIONS
Status: DISCONTINUED | OUTPATIENT
Start: 2019-07-09 | End: 2019-07-09 | Stop reason: HOSPADM

## 2019-07-09 RX ORDER — ONDANSETRON 2 MG/ML
4 INJECTION INTRAMUSCULAR; INTRAVENOUS EVERY 30 MIN PRN
Status: DISCONTINUED | OUTPATIENT
Start: 2019-07-09 | End: 2019-07-09 | Stop reason: HOSPADM

## 2019-07-09 RX ORDER — ACETAMINOPHEN 325 MG/1
975 TABLET ORAL ONCE
Status: DISCONTINUED | OUTPATIENT
Start: 2019-07-09 | End: 2019-07-09 | Stop reason: HOSPADM

## 2019-07-09 RX ORDER — AMLODIPINE BESYLATE 5 MG/1
5 TABLET ORAL DAILY
Status: DISCONTINUED | OUTPATIENT
Start: 2019-07-09 | End: 2019-07-11 | Stop reason: HOSPADM

## 2019-07-09 RX ORDER — HYDROMORPHONE HYDROCHLORIDE 1 MG/ML
.3-.5 INJECTION, SOLUTION INTRAMUSCULAR; INTRAVENOUS; SUBCUTANEOUS EVERY 10 MIN PRN
Status: DISCONTINUED | OUTPATIENT
Start: 2019-07-09 | End: 2019-07-09 | Stop reason: HOSPADM

## 2019-07-09 RX ORDER — HYDRALAZINE HYDROCHLORIDE 20 MG/ML
10 INJECTION INTRAMUSCULAR; INTRAVENOUS EVERY 6 HOURS PRN
Status: DISCONTINUED | OUTPATIENT
Start: 2019-07-09 | End: 2019-07-11 | Stop reason: HOSPADM

## 2019-07-09 RX ORDER — FLUTICASONE PROPIONATE 50 MCG
1-2 SPRAY, SUSPENSION (ML) NASAL DAILY PRN
Status: DISCONTINUED | OUTPATIENT
Start: 2019-07-09 | End: 2019-07-11 | Stop reason: HOSPADM

## 2019-07-09 RX ORDER — CLINDAMYCIN PHOSPHATE 900 MG/50ML
900 INJECTION, SOLUTION INTRAVENOUS EVERY 8 HOURS
Status: DISCONTINUED | OUTPATIENT
Start: 2019-07-09 | End: 2019-07-11 | Stop reason: HOSPADM

## 2019-07-09 RX ORDER — PROCHLORPERAZINE 25 MG
12.5 SUPPOSITORY, RECTAL RECTAL EVERY 12 HOURS PRN
Status: DISCONTINUED | OUTPATIENT
Start: 2019-07-09 | End: 2019-07-11 | Stop reason: HOSPADM

## 2019-07-09 RX ORDER — PROPOFOL 10 MG/ML
INJECTION, EMULSION INTRAVENOUS PRN
Status: DISCONTINUED | OUTPATIENT
Start: 2019-07-09 | End: 2019-07-09

## 2019-07-09 RX ORDER — LIDOCAINE 40 MG/G
CREAM TOPICAL
Status: DISCONTINUED | OUTPATIENT
Start: 2019-07-09 | End: 2019-07-11 | Stop reason: HOSPADM

## 2019-07-09 RX ORDER — DEXAMETHASONE SODIUM PHOSPHATE 10 MG/ML
INJECTION, SOLUTION INTRAMUSCULAR; INTRAVENOUS PRN
Status: DISCONTINUED | OUTPATIENT
Start: 2019-07-09 | End: 2019-07-09

## 2019-07-09 RX ORDER — NALOXONE HYDROCHLORIDE 0.4 MG/ML
.1-.4 INJECTION, SOLUTION INTRAMUSCULAR; INTRAVENOUS; SUBCUTANEOUS
Status: DISCONTINUED | OUTPATIENT
Start: 2019-07-09 | End: 2019-07-09 | Stop reason: HOSPADM

## 2019-07-09 RX ORDER — DIPHENHYDRAMINE HCL 25 MG
25-50 TABLET ORAL DAILY PRN
COMMUNITY

## 2019-07-09 RX ADMIN — PROPOFOL 160 MG: 10 INJECTION, EMULSION INTRAVENOUS at 12:09

## 2019-07-09 RX ADMIN — DEXAMETHASONE SODIUM PHOSPHATE 4 MG: 10 INJECTION, SOLUTION INTRAMUSCULAR; INTRAVENOUS at 12:34

## 2019-07-09 RX ADMIN — KETOROLAC TROMETHAMINE 15 MG: 15 INJECTION, SOLUTION INTRAMUSCULAR; INTRAVENOUS at 05:16

## 2019-07-09 RX ADMIN — ROCURONIUM BROMIDE 50 MG: 10 INJECTION INTRAVENOUS at 12:09

## 2019-07-09 RX ADMIN — CEFTRIAXONE 1 G: 1 INJECTION, POWDER, FOR SOLUTION INTRAMUSCULAR; INTRAVENOUS at 05:16

## 2019-07-09 RX ADMIN — CLINDAMYCIN IN 5 PERCENT DEXTROSE 900 MG: 18 INJECTION, SOLUTION INTRAVENOUS at 16:31

## 2019-07-09 RX ADMIN — CEFTRIAXONE 1 G: 1 INJECTION, POWDER, FOR SOLUTION INTRAMUSCULAR; INTRAVENOUS at 18:43

## 2019-07-09 RX ADMIN — SUGAMMADEX 225 MG: 100 INJECTION, SOLUTION INTRAVENOUS at 13:23

## 2019-07-09 RX ADMIN — MIDAZOLAM 2 MG: 1 INJECTION INTRAMUSCULAR; INTRAVENOUS at 12:09

## 2019-07-09 RX ADMIN — FENTANYL CITRATE 100 MCG: 50 INJECTION, SOLUTION INTRAMUSCULAR; INTRAVENOUS at 12:09

## 2019-07-09 RX ADMIN — FENTANYL CITRATE 50 MCG: 50 INJECTION, SOLUTION INTRAMUSCULAR; INTRAVENOUS at 12:58

## 2019-07-09 RX ADMIN — LIDOCAINE HYDROCHLORIDE 80 MG: 20 INJECTION, SOLUTION INFILTRATION; PERINEURAL at 12:09

## 2019-07-09 RX ADMIN — AMLODIPINE BESYLATE 5 MG: 5 TABLET ORAL at 16:33

## 2019-07-09 RX ADMIN — METRONIDAZOLE 500 MG: 500 INJECTION, SOLUTION INTRAVENOUS at 05:54

## 2019-07-09 RX ADMIN — OMEPRAZOLE 20 MG: 20 CAPSULE, DELAYED RELEASE ORAL at 21:55

## 2019-07-09 RX ADMIN — SODIUM CHLORIDE, POTASSIUM CHLORIDE, SODIUM LACTATE AND CALCIUM CHLORIDE: 600; 310; 30; 20 INJECTION, SOLUTION INTRAVENOUS at 12:09

## 2019-07-09 RX ADMIN — CLINDAMYCIN PHOSPHATE 900 MG: 18 INJECTION, SOLUTION INTRAVENOUS at 13:21

## 2019-07-09 ASSESSMENT — ACTIVITIES OF DAILY LIVING (ADL)
ADLS_ACUITY_SCORE: 12
ADLS_ACUITY_SCORE: 12

## 2019-07-09 ASSESSMENT — MIFFLIN-ST. JEOR: SCORE: 1559.67

## 2019-07-09 NOTE — ED NOTES
Patient was bit by a cat on Saturday and when she saw urgent care, they told her to return to the ER if it is worse.  Patient reports more swelling and increased pain to the left hand.

## 2019-07-09 NOTE — OR NURSING
Dr. Alegria of anesthesia notified of potassium level of 3.1. Per Dr. Alegria, no need to replace at this time.

## 2019-07-09 NOTE — BRIEF OP NOTE
[facility], [place]    Brief Operative Note    Pre-operative diagnosis: Left Index Finger Flexor Tenosynovitis  Post-operative diagnosis * No post-op diagnosis entered *    Procedure: Procedure(s):  Incision and Drainage of Left Index Finger  Surgeon: Surgeon(s) and Role:     * Lowell Whelan MD - Primary     * Alec Cornell MD - Resident - Assisting     Anesthesia: General   Estimated blood loss: 2 ml  Tourniquet time: 29 min   Drains: None  Specimens:   ID Type Source Tests Collected by Time Destination   1 : Flexor tendon sheath Wound Other WOUND CULTURE AEROBIC BACTERIAL Lowell Whelan MD 7/9/2019 12:57 PM    2 : Flexor tendon sheath Wound Other ANAEROBIC BACTERIAL CULTURE Lowell Whelan MD 7/9/2019 12:59 PM      Findings:   Purulence expressed from left index finger flexor tendon sheath, culture x 2 collect and sent. .  Complications: None.    Orthopedics Primary  Activity: Up as tolerated; encourage active and passive ROM of wrist and fingers to prevent stiffness.   Weight bearing status: WBAT LUE.  Pain management: PRN PO meds   Antibiotics: IV clindamycin and ceftriaxone given penicillin allergy pending return of cultures  Diet: Begin with clear fluids and progress diet as tolerated.   DVT prophylaxis: ambulatory, mechanical while in the hospital  Dressings: Keep clean, dry and intact; orthopedics to change POD#1; begin with TID soaks in warm soapy water x 20 minutes on POD#1 and replace with dry dressing after soaks.   Elevation: Elevate LUE on pillows to keep above the level of the heart as much as possible.   Cultures: Pending, follow culture results closely.    Consults: ID to assist in abx selection and duration  Follow-up: Clinic with Dr. Harmon in 1 week for repeat wound check and hand therapy visit.    Disposition: Pending clinical improvement and final abx plan, anticipate discharge to home on POD #1-2.    Alec Cornell  MD  Orthopedic Surgery PGY-4  [phone]

## 2019-07-09 NOTE — ED PROVIDER NOTES
History     Chief Complaint   Patient presents with     Wound Infection     HPI  Theodora Hamilton is a 64 year old female without pertinent medical history who presents to the ED with finger swelling and pain. Patient reports having a cat bite her left index finger tip on Saturday (3 days ago). She presented to urgent care this past day and was prescribed doxycycline and clindamycin. She has started these medications, but feels like the swelling and pain have expanded. No fever.     I have reviewed the Medications, Allergies, Past Medical and Surgical History, and Social History in the Epic system.    Review of Systems  A complete review of systems was performed with pertinent positives and negatives noted in the HPI, and all other systems negative.     Physical Exam   BP: (!) 176/94  Pulse: 68  Temp: 98.3  F (36.8  C)  Resp: 18  Height: 152.4 cm (5')  Weight: 109.3 kg (241 lb)  SpO2: 97 %    Physical Exam  General: No acute distress. Appears stated age.   HENT: MMM, no oropharyngeal lesions  Eyes: PERRL, normal sclerae   Cardio: Regular rate, extremities well perfused  Resp: Normal work of breathing, normal respiratory rate  Neuro: alert and fully oriented. CN II-XII grossly intact. Grossly normal strength and sensation in all extremities.   MSK: left hand: index finger with punctate skin break with intact scab over palmar distal phalanx; swelling and tenderness of the index finger from tip to palm, pain with finger extension, held in partial flexion; mild swelling extending to distal palm near 2nd MCP and 3rd MCP. No other deformities.   Integumentary/Skin: no rash, normal color  Psych: normal affect, normal behavior      ED Course      Procedures  Results for orders placed during the hospital encounter of 07/09/19   POC US SOFT TISSUE    Impression [facility] Procedure Note   Limited Bedside ED Ultrasound of Soft Tissue:  PROCEDURE: PERFORMED BY: Dr. Leonel Hartley  INDICATIONS/SYMPTOM: Skin redness,  evaluate for cellulitis and/or flexor tenosynovitis   PROBE: High frequency linear probe  BODY LOCATION: Soft tissue located on left index and middle fingers.   FINDINGS: there is cobblestoning of the soft tissue on the index finger on the palmar aspect. There is fluid surrounding the flexor tendon of the left index finger. Soft tissue of the middle finger is normal.  INTERPRETATION:  The soft tissue and muscle layers were evaluated. Findings consistent with cellulitis and flexor tenosynovitis of the left index finger. Left middle finger appears normal.   IMAGE DOCUMENTATION: Images were archived to PACs system.        Critical Care time:  none       Labs Ordered and Resulted from Time of ED Arrival Up to the Time of Departure from the ED   CBC WITH PLATELETS DIFFERENTIAL - Abnormal; Notable for the following components:       Result Value    MCHC 30.9 (*)     All other components within normal limits   BASIC METABOLIC PANEL - Abnormal; Notable for the following components:    Potassium 3.1 (*)     Chloride 114 (*)     Glucose 124 (*)     Calcium 7.9 (*)     All other components within normal limits   BLOOD CULTURE   BLOOD CULTURE            Assessments & Plan (with Medical Decision Making)   Patient presenting with left index finger pain and swelling that has continued expanding despite starting doxycycline and clindamycin this past day. Vitals in the ED unremarkable. Initial differential diagnosis includes but not limited to flexor tenosynovitis, cellulitis, edema, wound infection.     Exam concerning for tenosynovitis with swollen painful finger, limited ROM with pain along the distribution of the flexor tendon with passive extension. Bedside ultrasound showed cobblestoning consistent with cellulitis and fluid around the flexor tendon sheath consistent with flexor tenosynovitis. Metronidazole and ceftriaxone given. Ortho consulted, recommended admission to their service, NPO status.     After counseling on the  diagnosis, work-up, and treatment plan, the patient was admitted to ortho.       Final diagnoses:   Flexor tenosynovitis of finger   Swelling of left index finger       --  Leonel Hartley   Emergency Medicine     I have reviewed the nursing notes.  I have reviewed the findings, diagnosis, plan and need for follow up with the patient.  7/9/2019   [facility], [place], EMERGENCY DEPARTMENT     Leonel Hartley MD  07/09/19 0643

## 2019-07-09 NOTE — ANESTHESIA PREPROCEDURE EVALUATION
Anesthesia Pre-Procedure Evaluation    Patient: Theodora Hamilton   MRN:     [id number] Gender:   female   Age:    64 year old :      1955        Preoperative Diagnosis: Left Index Finger Flexor Tenosynovitis   Procedure(s):  Incision and Drainage of Left Index Finger     Past Medical History:   Diagnosis Date     Asymptomatic postmenopausal status (age-related) (natural)       Past Surgical History:   Procedure Laterality Date     NO HISTORY OF SURGERY                     PHYSICAL EXAM:   Mental Status/Neuro:    Airway:   Mallampati: II  Mouth/Opening: Full  TM distance: > 6 cm  Neck ROM: Full   Respiratory:   Resp. Rate: Normal     Resp. Effort: Normal      CV:    Comments:                    Lab Results   Component Value Date    WBC 7.1 2019    HGB 13.5 2019    HCT 43.7 2019     2019    CRP 27.0 (H) 2019    SED 19 2019     2019    POTASSIUM 3.1 (L) 2019    CHLORIDE 114 (H) 2019    CO2 21 2019    BUN 17 2019    CR 0.86 2019     (H) 2019    BRETT 7.9 (L) 2019    ALBUMIN 3.6 2014    PROTTOTAL 7.4 2014    ALT 31 2014    AST 14 2014    ALKPHOS 121 2014    BILITOTAL 0.4 2014    LIPASE 175 2014    TSH 1.73 2006       Preop Vitals  BP Readings from Last 3 Encounters:   19 150/78   17 110/74   17 126/72    Pulse Readings from Last 3 Encounters:   19 76   17 76   17 72      Resp Readings from Last 3 Encounters:   19 20   17 18    SpO2 Readings from Last 3 Encounters:   19 98%   10/01/14 97%      Temp Readings from Last 1 Encounters:   19 36.4  C (97.6  F) (Oral)    Ht Readings from Last 1 Encounters:   19 1.524 m (5')      Wt Readings from Last 1 Encounters:   19 109.3 kg (241 lb)    Estimated body mass index is 47.07 kg/m  as calculated from the following:    Height as of this encounter: 1.524 m  (5').    Weight as of this encounter: 109.3 kg (241 lb).     LDA:  Peripheral IV 07/09/19 Right Lower forearm (Active)   Site Assessment WDL 7/9/2019 11:36 AM   Line Status Saline locked 7/9/2019 11:36 AM   Phlebitis Scale 0-->no symptoms 7/9/2019 11:36 AM   Infiltration Scale 0 7/9/2019 11:36 AM   Infiltration Site Treatment Method  None 7/9/2019 11:36 AM   Extravasation? No 7/9/2019 11:36 AM   Number of days: 0       ETT (adult) (Active)   Number of days: 0       ETT (adult) 7 (Active)   Number of days: 0            Assessment:   ASA SCORE: 2    NPO Status: > 6 hours since completed Solid [employer]   Documentation: Preop Testing complete   Proceeding: Proceed without further delay  Tobacco Use:  NO Active use of Tobacco/UNKNOWN Tobacco use status     Plan:   Anes. Type:  General   Pre-Induction: Midazolam IV; Acetaminophen PO   Induction:  IV (Standard)   Airway: Oral ETT   Access/Monitoring: PIV   Maintenance: Balanced   Emergence: Procedure Site        Postop Pain/Sedation Strategy:  Standard-Options: Opioids PRN     PONV Management:  Adult Risk Factors: Female, Non-Smoker, Postop Opioids  Prevention: Ondansetron; Dexamethasone     CONSENT: Direct conversation   Plan and risks discussed with: Patient                            Linda Alegria MD

## 2019-07-09 NOTE — H&P
[facility], Orthopaedic Surgery H&P    Theodora Hamilton MRN# [id number]   Age: 64 year old YOB: 1955     Date of Admission:  7/9/2019    Reason for admission: Left index finger FTS                 Assessment and Plan:   Assessment:  1) left index finger pyogenic FTS s/p cat bite    Plan:  - remain NPO for OR today  - will consent at bedside for left index finger I&D with Dr. Whelan  - preop labs ordered  - IV rocephin q24 ordered  - Medicine service consulted for preop risk assessment             History of Present Illness:   Patient was seen and examined by me. History, PMH, Meds, SH, complete ROS (10 organ systems) and PE reviewed with patient and prior medical records.      65yo RHD F who sustained cat bite injury to palmar aspect of left index finger 2 days prior to presentation. It is her personal pet cat, up to date on vaccines. Not acting erratically - bit owners hand when attempting to administer medications. Worsened over a 24 hour period, prompting presentation to OSH Urgent Care. Began on PO doxycycline and clindamycin. Without improvement over the last 24 hours prompting presentation to ED this morning. Denies fevers, chills, sweats, change in distal sensation. Pain throughout index finger, with proximally migrating eryethema and swelling (now in dorsal 2nd webspace).           Past Medical History:     Past Medical History:   Diagnosis Date     Asymptomatic postmenopausal status (age-related) (natural)              Past Surgical History:     Past Surgical History:   Procedure Laterality Date     NO HISTORY OF SURGERY               Social History:     Social History     Socioeconomic History     Marital status: Single     Spouse name: Not on file     Number of children: 0     Years of education: Not on file     Highest education level: Not on file   Occupational History     Employer: [employer]   Social Needs     Financial resource strain: Not on file     Food  insecurity:     Worry: Not on file     Inability: Not on file     Transportation needs:     Medical: Not on file     Non-medical: Not on file   Tobacco Use     Smoking status: Former Smoker     Types: Cigarettes     Smokeless tobacco: Never Used   Substance and Sexual Activity     Alcohol use: Yes     Comment: 5 times per year     Drug use: No     Sexual activity: Yes     Partners: Female   Lifestyle     Physical activity:     Days per week: Not on file     Minutes per session: Not on file     Stress: Not on file   Relationships     Social connections:     Talks on phone: Not on file     Gets together: Not on file     Attends Congregation service: Not on file     Active member of club or organization: Not on file     Attends meetings of clubs or organizations: Not on file     Relationship status: Not on file     Intimate partner violence:     Fear of current or ex partner: Not on file     Emotionally abused: Not on file     Physically abused: Not on file     Forced sexual activity: Not on file   Other Topics Concern     Parent/sibling w/ CABG, MI or angioplasty before 65F 55M? No     Comment: father   Social History Narrative     Not on file             Family History:     Family History   Problem Relation Age of Onset     C.A.CHICHI Maternal Grandmother      CAdalbertoAYURY Maternal Grandfather         17 MI's per history     C.AYURY Paternal Grandmother      Diabetes Paternal Grandmother      CAdalbertoAYURY Paternal Grandfather      Diabetes Paternal Grandfather      Hypertension Mother      C.A.CHICHI Mother         pacemaker     Circulatory Father         Carotid artery stenosis     Respiratory Father 75        COPD     Diabetes Paternal Aunt      Diabetes Paternal Uncle      Asthma No family hx of      Cerebrovascular Disease No family hx of      Breast Cancer No family hx of      Cancer - colorectal No family hx of      Prostate Cancer No family hx of               Medications:     Current Facility-Administered Medications   Medication      lidocaine (PF) (XYLOCAINE) 1 % injection     Current Outpatient Medications   Medication Sig     fluticasone (FLONASE) 50 MCG/ACT spray Spray 1-2 sprays into both nostrils daily     loratadine (CLARITIN) 10 MG tablet Take 1 tablet (10 mg) by mouth daily     omeprazole 20 MG tablet Take 1 tablet (20 mg) by mouth daily Take 30-60 minutes before a meal.             Allergies:      Allergies   Allergen Reactions     Amoxicillin Hives            Review of Systems:   A comprehensive 10 point review of systems (constitutional, ENT, cardiac, peripheral vascular, respiratory, GI, , Musculoskeletal, skin, Neurological) was performed and found to be negative except as described in this note.           Physical Exam:   COMPLETE EXAMINATION:   VITAL SIGNS: /81   Pulse 62   Temp 98.2  F (36.8  C) (Oral)   Resp 18   Ht 1.524 m (5')   Wt 109.3 kg (241 lb)   LMP  (LMP Unknown)   SpO2 95%   Breastfeeding? No   BMI 47.07 kg/m    RESP: Non labored breathing  ABD: Benign  SKIN: Grossly normal   LYMPHATIC:  Grossly normal  NEURO:  Grossly normal   VASCULAR: Satisfactory perfusion of all extremities  MUSCULOSKELETAL:   LUE:  - sign of palmar cat bite injury over middle phalanx  - fusiform swelling left index finger  - inability to actively/passively range finger 2/2 pain  - pain with palpation flexor tendon sheath  - worsened pain over 2nd MCP soft tissue edema  - SILT m/r/u nerves, distal index finger  - digits wwp  - fires ehl/fhl/io muscles          Data:   All pertinent laboratory data reviewed  All imaging studies reviewed by me.    Recent Labs   Lab Test 07/09/19  0422 09/05/14  0842   HGB 13.5 14.6   WBC 7.1 5.5     Imaging:  XR left hand - no radiopaque foreign body observed, no bony abnormality         Signed:    This consultation has been discussed with Dr. Banks, Attending Physician.    Lowell Fuentes MD  Orthopaedic Surgery PGY4  Pager [phone]

## 2019-07-09 NOTE — ANESTHESIA CARE TRANSFER NOTE
Patient: Theodora Hamilton    Procedure(s):  Incision and Drainage of Left Index Finger    Diagnosis: Left Index Finger Flexor Tenosynovitis  Diagnosis Additional Information: No value filed.    Anesthesia Type:   No value filed.     Note:  Airway :Face Mask  Patient transferred to:PACU  Comments: Pt transferred to PACU, breathing spontaneously, VSS. Report given to PACU nurse. Handoff Report: Identifed the Patient, Identified the Reponsible Provider, Reviewed the pertinent medical history, Discussed the surgical course, Reviewed Intra-OP anesthesia mangement and issues during anesthesia, Set expectations for post-procedure period and Allowed opportunity for questions and acknowledgement of understanding      Vitals: (Last set prior to Anesthesia Care Transfer)    CRNA VITALS  7/9/2019 1302 - 7/9/2019 1336      7/9/2019             Resp Rate (set):  10                Electronically Signed By: Annita Stover  July 9, 2019  1:36 PM

## 2019-07-09 NOTE — ED NOTES
[facility], [place]   ED Nurse to Floor Handoff     Theodora Hamilton is a 64 year old female who speaks English and lives with a spouse,  in a home  They arrived in the ED by car from home    ED Chief Complaint: Wound Infection    ED Dx;   Final diagnoses:   Flexor tenosynovitis of finger   Swelling of left index finger         Needed?: No    Allergies:   Allergies   Allergen Reactions     Amoxicillin Hives   .  Past Medical Hx:   Past Medical History:   Diagnosis Date     Asymptomatic postmenopausal status (age-related) (natural)       Baseline Mental status: WDL  Current Mental Status changes: at basesline    Infection present or suspected this encounter: yes, cultures pending   Sepsis suspected: No  Isolation type: No active isolations     Activity level - Baseline/Home:  Independent  Activity Level - Current:   Independent    Bariatric equipment needed?: No    In the ED these meds were given:   Medications   ketorolac (TORADOL) injection 15 mg (15 mg Intravenous Given 7/9/19 0516)   cefTRIAXone (ROCEPHIN) 1 g vial to attach to  mL bag for ADULTS or NS 50 mL bag for PEDS (1 g Intravenous New Bag 7/9/19 0516)   metroNIDAZOLE (FLAGYL) infusion 500 mg (500 mg Intravenous New Bag 7/9/19 0554)       Drips running?  No    Home pump  No    Current LDAs  Peripheral IV 07/09/19 Right Upper forearm (Active)   Site Assessment WDL 7/9/2019  4:26 AM   Phlebitis Scale 0-->no symptoms 7/9/2019  4:26 AM   Number of days: 0       Labs results:   Labs Ordered and Resulted from Time of ED Arrival Up to the Time of Departure from the ED   CBC WITH PLATELETS DIFFERENTIAL - Abnormal; Notable for the following components:       Result Value    MCHC 30.9 (*)     All other components within normal limits   BASIC METABOLIC PANEL - Abnormal; Notable for the following components:    Potassium 3.1 (*)     Chloride 114 (*)     Glucose 124 (*)     Calcium 7.9 (*)     All other components within  normal limits   BLOOD CULTURE   BLOOD CULTURE       Imaging Studies:   Recent Results (from the past 24 hour(s))   POC US SOFT TISSUE    Impression    [facility] Procedure Note   Limited Bedside ED Ultrasound of Soft Tissue:  PROCEDURE: PERFORMED BY: Dr. Leonel Hartley  INDICATIONS/SYMPTOM: Skin redness, evaluate for cellulitis and/or flexor tenosynovitis   PROBE: High frequency linear probe  BODY LOCATION: Soft tissue located on left index and middle fingers.   FINDINGS: there is cobblestoning of the soft tissue on the index finger on the palmar aspect. There is fluid surrounding the flexor tendon of the left index finger. Soft tissue of the middle finger is normal.  INTERPRETATION:  The soft tissue and muscle layers were evaluated. Findings consistent with cellulitis and flexor tenosynovitis of the left index finger. Left middle finger appears normal.   IMAGE DOCUMENTATION: Images were archived to PACs system.   XR Hand Left G/E 3 Views    Narrative    3 views left hand radiographs 7/9/2019 6:45 AM    History: cat bite of the left index finger.    Comparison: None.    Findings:    PA, oblique and lateral view(s) of the left hand were obtained.     No acute osseous abnormality. No erosion.     Soft tissue swelling of the index finger. No radiopaque foreign body.    Mild degenerative changes particularly at the distal interphalangeal  joints.      Impression    Impression:  1. No acute osseous abnormality.  2. Soft tissue swelling of the left index finger.    I have personally reviewed the examination and initial interpretation  and I agree with the findings.    ARIEL CUEVAS       Recent vital signs:   /85   Pulse 68   Temp 98.3  F (36.8  C) (Oral)   Resp 18   Ht 1.524 m (5')   Wt 109.3 kg (241 lb)   LMP  (LMP Unknown)   SpO2 99%   Breastfeeding? No   BMI 47.07 kg/m      Claudine Coma Scale Score: 15 (07/09/19 2057)       Cardiac Rhythm: Normal Sinus  Pt needs tele? No  Skin/wound  Issues: cat bite on second finger on L hand    Code Status: Full Code    Pain control: good    Nausea control: pt had none    Abnormal labs/tests/findings requiring intervention: see results    Family present during ED course? Yes   Family Comments/Social Situation comments: family supportive at bedside    Tasks needing completion: None    Janett Ballard  [phone] [facility]

## 2019-07-09 NOTE — OP NOTE
I participated and was present with the resident throughout the operative procedure noted above.  I agree with the findings and operative note as transcribed.PREOPERATIVE DIAGNOSIS:   1. Left index finger flexor tenosynovitis      POSTOPERATIVE DIAGNOSIS:     1. Left index finger flexor tenosynovitis       PROCEDURE PERFORMED:     1. Left index finger irrigation and debridement, skin tissue, fascia and tendon sheath      ATTENDING PHYSICIAN:   Lowell Whelan MD        ASSISTANT: Alec Cornell MD PGY-4      ANESTHESIA: General      ESTIMATED BLOOD LOSS:   2 mL        TOURNIQUET TIME: 29 minutes      IMPLANTS:  None.         SPECIMENS: None.      COMPLICATIONS: None apparent    FINDINGS:   Gross purulence within the flexor tendon sheath upon opening of the sheath both proximally and distally.  Cultures x2 sent.      INDICATIONS: This patient is a  64 year old year old female who presented to the emergency department earlier this morning with left index finger pain and swelling.  Patient sustained a cat bite injury to the palm of her left index finger 2 days prior to presentation.  She was attempting to administer medications to her cat when it bit her index finger.  She was initially seen in the hospital urgent care and was given oral doxycycline and clindamycin.  She had worsening in the last 24 hours after initiation of this oral antibiotic regimen and therefore presented for repeat evaluation.  Findings have evaluation by the orthopedic team left index finger flexor sheath synovitis.  Discussion on recommendations of treatment included recommendation for surgical irrigation and debridement of the left index finger along with admission for empiric IV antibiotics pending culture results and monitoring of her finger clinically.  Risks of surgery were discussed including but not limited to anesthesia related complications, infection, bleeding, wound healing problems, damage to surrounding structures including  nerves, blood vessels, bone, tendon, recurrence, stiffness, and persistent pain. The patient expressed understanding and wished to proceed with the above surgery. Informed consent was obtained.       PROCEDURE:   The patient was identified in the preoperative area. The surgical site was marked per hospital policy.  Patient was brought back to the operating room and placed in a supine position. All bony prominences were padded. A safety belt was applied. The patient underwent general anesthesia without complication. A non sterile tourniquet was placed to the left upper extremity. The left upper extremity was then prepped and draped in the typical sterile fashion. A time out was held with all operating room staff to confirm correct patient, laterality and procedure to be performed; all were in agreement. The left upper extremity was then exsanguinated via gravity and tourniquet was inflated to 250 mmHg.    We turned our attention to the left index finger.  An initial burner incision was created over the middle and distal phalanx with the apex at the DIP flexion crease.  A 15 blade scalpel was used to incise through skin and subcutaneous tissue.  Her ulnar digital nerve was visualized and protected throughout the entirety of the procedure.  A skin flap was then created  and raised radially in the flexor tendon sheath was visualized.  A longitudinal incision was made into the flexor tendon sheath with return of thin purulent fluid and a swab was obtained to send for culture.  We expressed additional fluid from the tendon sheath with direct pressure approximately milking and out of the tendon sheath.  We then created a second proximal incision using her proximal palmar crease centered over the distal first metacarpal.  Incision was made with 15 blade scalpel through skin and subcutaneous  tissue.  Time says it was used to spread through palmar fascia and fat until the flexor tendon sheath was identified.  15 blade  scalpel was used to make a longitudinal incision into the A1 pulley and this was then further opened with a tenotomy scissors proximally to the extent of the A2 pulley and distally into the A0 pulley.  Again thin purulent fluid was visualized and a swab was sent for culture.  We excisionally debrided the proximal aspect of the tendon with tenotomy scissors which had a small degree of synovium present.  We then used a small pediatric feeding tube to irrigate her tendon sheath.  We fed the catheter from our distal rent in the flexor tendon sheath and advanced it proximally.  We then copiously irrigating the flexor tendon sheath with normal sterile saline directly visualizing return of saline from the proximal rent in our flexor tendon sheath.  We additionally for the catheter tubing from a proximal to distal in a similar fashion and again irrigated the tendon sheath with normal sterile saline with visualization of return of clear fluid from the distal rent in our tendon sheath.  After satisfied with the debridement we then remove the catheter tubing and began closure. The wound was closed with a single interrupted 5-0 nylon suture at the apex of our Bertha incision and a single interrupted 5-0 nylon suture was placed centrally in our proximal wound. A sterile dressing was applied of  bacitracin, Adaptic, 4 x 4, Per wrap and Coban was applied.     The tourniquet was taken down. The patient was woken from general anesthesia and extubated without complication. The patient was brought to the recovery room in stable condition.  All sponge, needle and instrument counts were correct at the end of the case. Dr. Whelan was scrubbed and present for all critical portions of the procedure.     PLAN: Patient will be admitted to the orthopedic service for IV antibiotics and clinical monitoring of her finger for improvement.  She will keep her dressings on for 24 hours and the orthopedic service will remove and assess her  incisions on postoperative day 1.  She will begin 3 times daily soaks in warm soapy water 20 minutes duration starting on postoperative day 1.  We will follow cultures closely and guide oral antibiotic treatment based on the culture results.  Infectious disease will be consulted to assist in antibiotic selection and duration.  She will be weightbearing as tolerated in the left upper extremity.  We will have occupational therapy assess and begin active and passive range of motion to prevent stiffness while in the hospital.  She will likely discharge on postoperative day 2 provided clinical improvement and final antibiotic plan.  She will follow-up in 1 week's time in clinic for a wound check.    Dictated by:  Alec Cornell MD  Orthopedic Surgery PGY-4  [phone]

## 2019-07-09 NOTE — ANESTHESIA POSTPROCEDURE EVALUATION
Anesthesia POST Procedure Evaluation    Patient: Theodora Hamilton   MRN:     [id number] Gender:   female   Age:    64 year old :      1955        Preoperative Diagnosis: Left Index Finger Flexor Tenosynovitis   Procedure(s):  Incision and Drainage of Left Index Finger   Postop Comments: No value filed.       Anesthesia Type:  General  No value filed.    Reportable Event: NO     PAIN: Uncomplicated   Sign Out status: Comfortable, Well controlled pain     PONV: No PONV   Sign Out status:  No Nausea or Vomiting     Neuro/Psych: Uneventful perioperative course   Sign Out Status: Preoperative baseline     Airway/Resp.: Uneventful perioperative course   Sign Out Status: Resp. Status within EXPECTED Parameters     CV: Uneventful perioperative course   Sign Out status: Appropriate BP and perfusion indices; Appropriate HR/Rhythm     Disposition:   Sign Out in:  PACU  Recovery Course: Uneventful  Follow-Up: Not required           Last Anesthesia Record Vitals:  CRNA VITALS  2019 1302 - 2019 1402      2019             Resp Rate (set):  10          Last PACU Vitals:  Vitals Value Taken Time   /68 2019  2:30 PM   Temp 36.4  C (97.6  F) 2019  2:30 PM   Pulse 65 2019  2:30 PM   Resp 13 2019  2:30 PM   SpO2 99 % 2019  2:33 PM   Temp src     NIBP     Pulse     SpO2     Resp     Temp     Ht Rate     Temp 2     Vitals shown include unvalidated device data.      Electronically Signed By: Linda Alegria MD, 2019, 2:41 PM

## 2019-07-09 NOTE — OR NURSING
Dr. Whelan at bedside in PACU. Aware patient left hand fingers feeling numb from local given at end of case. Assessed dressing, can reinforce if need be, but small amount of drainage seen now is okay.

## 2019-07-09 NOTE — ED NOTES
Walter HOFFMANN requested that we look into date of last TDAP; patient is up to date with last booster in 2015

## 2019-07-09 NOTE — PHARMACY-ADMISSION MEDICATION HISTORY
Admission medication history interview status for the 7/9/2019 admission is complete. See Epic admission navigator for allergy information, pharmacy, prior to admission medications and immunization status.     Medication history interview sources:  Patient and dispense records.       Changes made to PTA medication list (reason)  Added:   -Alum & mag hydroxide-simethicone (MYLANTA/MAALOX) 200-200-20 MG/5ML SUSP suspension: Take 30 mL by mouth as needed for indigestion or heartburn. (patient reports using this about once every 3 weeks)   -Diphenhydramine 25 mg tablet: Take 25-50 mg by mouth daily as needed for allergies  -Clindamycin (CLEOCIN) 150 MG capsule:  Take 150 mg by mouth 3 times daily. (started on 7/8/19 for a 10 day course to end on 7/18/19)   -Doxycycline hyclate (VIBRAMYCIN) 100 MG capsule: Take 1 capsule by mouth twice daily.   Deleted:   -Fluticasone 50 MCG/ACT spray: Spray 1-2 sprays into both nostrils daily. (Patient has stopped using this due to improved allergies).   -Loratadine 10 mg tablet: Take 1 tablet by mouth daily. (Patient has stopped using this due to improved allergies).   Changed:   Omeprazole 20 mg tablet was changed from taking 1 tablet by mouth daily, to taking 1 tablet by mouth as needed for indigestion and heartburn.     Additional medication history information (including reliability of information, actions taken by pharmacist):    -Patient is a reliable medication historian and knew her medications by name, indication, and frequency.   -Patient confirmed that she fills her prescriptions at a [facility] pharmacy in [place] (#[id number]), which matches dispensing records.   -The patient took her first dose of both clindamycin and doxycycline at home on 7/8/19, but did not take any of these at home today (7/9/19). Patient has been transitioned to IV antibiotics.   -Patient states that her allergies seem to be under-control with her as needed diphenhydramine.       Prior to Admission  medications    Medication Sig Last Dose Taking? Auth Provider   alum & mag hydroxide-simethicone (MYLANTA/MAALOX) 200-200-20 MG/5ML SUSP suspension Take 30 mLs by mouth once as needed for indigestion or heartburn (uses 2-3 times per week)  Yes Unknown, Entered By History   clindamycin (CLEOCIN) 150 MG capsule Take 150 mg by mouth 3 times daily 7/8/2019 at Unknown time Yes Unknown, Entered By History   diphenhydrAMINE (BENADRYL) 25 MG tablet Take 25-50 mg by mouth daily as needed for allergies Past Week at Unknown time Yes Unknown, Entered By History   doxycycline hyclate (VIBRAMYCIN) 100 MG capsule Take 100 mg by mouth 2 times daily 7/8/2019 at Unknown time Yes Unknown, Entered By History   omeprazole 20 MG tablet Take 1 tablet (20 mg) by mouth daily Take 30-60 minutes before a meal.  Patient taking differently: Take 20 mg by mouth daily as needed (patient uses about once every 3 weeks.) Take 30-60 minutes before a meal. Past Week at Unknown time Yes Florecita García PA-C         Medication history completed by: Kory Sauer PharmD 4 Student

## 2019-07-10 LAB — GLUCOSE BLDC GLUCOMTR-MCNC: 92 MG/DL (ref 70–99)

## 2019-07-10 PROCEDURE — 12000001 ZZH R&B MED SURG/OB UMMC

## 2019-07-10 PROCEDURE — 25000128 H RX IP 250 OP 636: Performed by: STUDENT IN AN ORGANIZED HEALTH CARE EDUCATION/TRAINING PROGRAM

## 2019-07-10 PROCEDURE — 40000894 ZZH STATISTIC OT IP EVAL DEFER

## 2019-07-10 PROCEDURE — 25000125 ZZHC RX 250: Performed by: STUDENT IN AN ORGANIZED HEALTH CARE EDUCATION/TRAINING PROGRAM

## 2019-07-10 PROCEDURE — 00000146 ZZHCL STATISTIC GLUCOSE BY METER IP

## 2019-07-10 RX ORDER — OXYCODONE HYDROCHLORIDE 5 MG/1
5 TABLET ORAL EVERY 4 HOURS PRN
Status: DISCONTINUED | OUTPATIENT
Start: 2019-07-10 | End: 2019-07-11 | Stop reason: HOSPADM

## 2019-07-10 RX ADMIN — CLINDAMYCIN IN 5 PERCENT DEXTROSE 900 MG: 18 INJECTION, SOLUTION INTRAVENOUS at 01:44

## 2019-07-10 RX ADMIN — CLINDAMYCIN IN 5 PERCENT DEXTROSE 900 MG: 18 INJECTION, SOLUTION INTRAVENOUS at 15:25

## 2019-07-10 RX ADMIN — CLINDAMYCIN IN 5 PERCENT DEXTROSE 900 MG: 18 INJECTION, SOLUTION INTRAVENOUS at 07:42

## 2019-07-10 RX ADMIN — CEFTRIAXONE 1 G: 1 INJECTION, POWDER, FOR SOLUTION INTRAMUSCULAR; INTRAVENOUS at 18:11

## 2019-07-10 ASSESSMENT — ACTIVITIES OF DAILY LIVING (ADL)
TRANSFERRING: 0-->INDEPENDENT
BATHING: 0-->INDEPENDENT
ADLS_ACUITY_SCORE: 10
RETIRED_EATING: 0-->INDEPENDENT
RETIRED_COMMUNICATION: 0-->UNDERSTANDS/COMMUNICATES WITHOUT DIFFICULTY
DRESS: 0-->INDEPENDENT
ADLS_ACUITY_SCORE: 10
FALL_HISTORY_WITHIN_LAST_SIX_MONTHS: NO
SWALLOWING: 0-->SWALLOWS FOODS/LIQUIDS WITHOUT DIFFICULTY
TOILETING: 0-->INDEPENDENT
ADLS_ACUITY_SCORE: 10
AMBULATION: 0-->INDEPENDENT
ADLS_ACUITY_SCORE: 10
ADLS_ACUITY_SCORE: 12
ADLS_ACUITY_SCORE: 12
COGNITION: 0 - NO COGNITION ISSUES REPORTED

## 2019-07-10 ASSESSMENT — MIFFLIN-ST. JEOR: SCORE: 1492.54

## 2019-07-10 NOTE — PROGRESS NOTES
Orthopaedic Surgery Progress Note   July 10, 2019    Subjective: No acute events overnight. Denies pain at this time. Reports good sleep last night. No numbness or tingling. Denies fever or chills, CP, SOB. No concerns this AM.     Objective: /62   Pulse 65   Temp 96.3  F (35.7  C) (Oral)   Resp 18   Ht 1.524 m (5')   Wt 109.3 kg (241 lb)   LMP  (LMP Unknown)   SpO2 98%   Breastfeeding? No   BMI 47.07 kg/m      General: NAD, alert and oriented, cooperative with exam.   Cardio: RRR, extremities wwp.   Respiratory: Non-labored breathing.  MSK: Focused examination of LUE reveals L index finger with well healing incisions, sutures in place. Small sanguinous drainage from distal brunner incision after removing dressing, but otherwise no purulent drainage noted. No significant redness. No TTP. Passive and actively flexes and extends without pain. Fingers wwp, radial pulse 2+, bcr in all digits. SILT to m/u/r distribution with normal sensation to radial and ulnar digital nerves to the left index finger.     Labs:  Lab Results   Component Value Date    WBC 7.1 07/09/2019    HGB 13.5 07/09/2019     07/09/2019       Assessment and Plan: Theodora Hamilton is a 64 year old female with L index finger FTS after cat bites now s/p L index finger I&D on 7/9/19 with Dr. Whelan. Doing well post-operatively.     Orthopedics Primary  Activity: Up as tolerated; encourage active and passive ROM of wrist and fingers to prevent stiffness.   Weight bearing status: WBAT LUE.  Pain management: PRN PO meds   Antibiotics: IV clindamycin and ceftriaxone given penicillin allergy pending return of cultures  Diet: Begin with clear fluids and progress diet as tolerated.   DVT prophylaxis: ambulatory, mechanical while in the hospital  Dressings: Begin with TID soaks in warm soapy water x 20 minutes today and replace with dry dressing after soaks.   Elevation: Elevate LUE on pillows to keep above the level of the heart as much as  possible.   Cultures: Pending, follow culture results closely.    Consults: ID to assist in abx selection and duration  Follow-up: Clinic with Dr. Harmon in 1 week for repeat wound check and hand therapy visit.    Disposition: Pending clinical improvement and final abx plan, anticipate discharge to home tomorrow provided continued improvement and abx plan     Alec Cornell MD  Orthopedic Surgery PGY-4  [phone]   For questions about this patient during weekday business hours, please attempt to contact me at my pager prior to contacting the Orthopaedic Surgery resident on call. On the weekends and overnight, please page the Orthopaedic Surgery resident on call. Thank you!

## 2019-07-10 NOTE — PLAN OF CARE
OT 7C: OT orders received and acknowledged. Pt with no safety or discharge concerns, mobilizing well in room and managing ADLs. Pt provided with wrist and digital ROM exercises to promote ROM for ADLs following I&D of L index finger. Educated on safe completion, grading, and appropriate frequency and intensity. Educated on plan to progress exercises and monitoring of L index finger function with OP hand therapy. No further acute needs identified. Will complete orders.

## 2019-07-10 NOTE — PLAN OF CARE
A&OX4. OVSS. POD1 of Left index finger irrigation and debridement, skin tissue, fascia and tendon sheath (per MD note). Patient denied pain all night. Left hand wrapped in bandage with minimal drainage. PIV is saline locked in between antibiotics. SBA assist. Voiding spontaneously.  Continue with plan of care.

## 2019-07-10 NOTE — PROGRESS NOTES
[facility] ID Service: Follow up Note     Patient:  Theodora Hamilton, Date of birth 1955, Medical record number [id number]  Date of Visit:  July 10, 2019         Assessment and Recommendations:   ID Problem List:  - Left index finger flexor tenosynovitis after cat bite, status post I&D 7/9  - Reported allergy to amoxicillin (rash)        Recommendations:  - Continue ceftriaxone and clindamycin pending culture results.  - Recheck CRP tomorrow (ordered for you).   - If cultures remain negative tomorrow, okay from an ID perspective to discharge on empiric abx (cefuroxime 500mg po bid + clindamycin 300mg po tid).  - Anticipate ~14 day course of therapy, assuming ongoing improvement.  - Follow up with me in the [facility] ID clinic on Tuesday, July 16th at 12pm.     Discussion: 65 yo previously healthy woman who sustained a cat bite to the left index finger which progressed on po abx (doxy/clinda), now status post I&D yesterday. Gross purulence was noted intraoperatively but cultures of pus has remained negative to date. I am hoping cultures will grow something by tomorrow, but if they have remained negative, will plan on discharge (assuming okay with ortho) with empiric abx (cefuroxime/clinda) and follow up in my clinic next week to ensure ongoing improvement and final duration of abx.     I will reassess tomorrow.  Don't hesitate to call with questions.     Attestation:  I have reviewed today's vital signs, medications, labs and imaging.  Alice Oliver MD  Pager [phone]         Interval History:   No issues overnight. Feeling well this morning.  Decreased swelling/erythema in hand and minimal pain. No fevers, chills. Hoping to get home soon.             Review of Systems:   9 point ROS obtained, pertinent positives and negatives as above.      Allergies:   Amoxicillin (rash)         Current Antimicrobials:   Ceftriaxone, start 7/9  Clindamycin, start 7/8    Prior:  Metronidazole 7/9  Doxycycline  7/8-7/9         Physical Exam:   Ranges forvital signs:  Temp:  [95.6  F (35.3  C)-97.7  F (36.5  C)] 96.3  F (35.7  C)  Pulse:  [64-76] 65  Heart Rate:  [63-73] 63  Resp:  [10-20] 18  BP: (144-196)/(62-98) 144/62  SpO2:  [92 %-99 %] 98 %  GENERAL:  well-developed, well-nourished, sitting in bed in no acute distress.   ENT:  Head is normocephalic, atraumatic.   EYES:  Eyes have anicteric sclerae.    NECK:  Supple.  LUNGS:  Breathing comfortably on RA  ABDOMEN:  Nondistended, soft, nontender.  EXT: LE warm and without edema. Left hand with decreased swelling throughout. Erythema over flexor aspect of left index finger, surgical wounds without purulence. Small amount of bleeding. Nontender to palpation. Able to move all 5 digits without discomfort. Full ROM at wrist. No swelling/tenderness in forearm.   SKIN: Left hand as above, no other skin changes. PIV is in place without any surrounding erythema.  NEUROLOGIC:  Grossly nonfocal.         Laboratory Data:   No new labs today.     Culture data:  Intraoperative cx 7/9 NGTD         Imaging:   Left hand xray 7/9:   1. No acute osseous abnormality.  2. Soft tissue swelling of the left index finger.

## 2019-07-10 NOTE — DOWNTIME EVENT NOTE
The EMR was down for 2 hours on 7/9/2019.    Dima Lafleur was responsible for completing the paper charting during this time period.     The following information was re-entered into the system by Dima Lafleur:  None   The following information will remain in the paper chart: None    Dima Lafleur  7/10/2019

## 2019-07-10 NOTE — PLAN OF CARE
/59 (BP Location: Left arm)   Pulse 65   Temp 96.8  F (36  C) (Oral)   Resp 18   Ht 1.524 m (5')   Wt 102.6 kg (226 lb 3.2 oz)   LMP  (LMP Unknown)   SpO2 98%   Breastfeeding? No   BMI 44.18 kg/m      A&Ox4. Denies pain and nausea. Tolerating regular diet. PIV saline locked in between antibiotics. Hand soak 1 of 3 daily done. Voiding spont. Passing gas. No BM. Up ad junior    PLAN: please teach pt about hand soaks

## 2019-07-10 NOTE — CONSULTS
[facility] ID Service: Initial Consultation     Patient:  Theodora Hamilton, Date of birth 1955, Medical record number [id number]  Date of Visit:  July 9, 2019  Consult Requested by: Dr. Whelan for antimicrobial management of left index finger tenosynovitis.          Assessment and Recommendations:   ID Problem List:  - Left index finger flexor tenosynovitis after cat bite, status post I&D 7/9  - Reported allergy to amoxicillin (rash)        Recommendations:  - Continue ceftriaxone and clindamycin pending culture results.     Discussion: 65 yo previously healthy woman who sustained a cat bite to the left index finger several days ago which progressed on po abx (doxy/clinda), now status post I&D. Gross purulence was noted intraoperatively. She has no current signs of systemic infection. Current regimen will cover Pasteurella (an organisms frequently found in cat's mouths), other GNRs and also GPC; clinda will cover anaerobes.  Will tailor abx once we have culture results.     As an aside, she has a reported allergy to amoxicillin but the rash she developed after taking this antibiotic developed in the setting of mononucleosis. This is a well described phenomenon that does not necessarily mean that the patient has a true PCN allergy. If cultures return with an organism that is optimally treated with a PCN, will discuss the possibility of drug challenge while hospitalized.     ID will continue to follow.  Don't hesitate to call with questions.     Attestation:  I have reviewed today's vital signs, medications, labs and imaging.  Alice Oliver MD  Pager [phone]         History of Present Illness:   Ms. Hamilton is a very pleasant 65 yo female with no past medical history who was bitten by her cat this past Saturday; this occurred when she was trying to administer medication to the cat.  She washed the area with soap and water and then applied bacitracin. Unfortunately, she developed increasing pain,  swelling and erythema prompting an Urgent Care visit yesterday. She was started on oral doxy/clinda and was referred to an outpatient Ortho clinic (appt scheduled for tomorrow) but swelling and pain worsened prompting her to present to our ED early this morning. At presentation she was afebrile and had a normal WBC count but exam was very concerning for tenosynovitis. She was started on ceftriaxone/flagyl and was evaluated by ortho. She ended up being taken to the OR this afternoon for debridement. Intraoperatively, she was noted to have gross purulence along the flexor tendon. Culures were sent and the area was washed out.     This evening she reports feeling okay.  She still has pain the finger but is able to move other fingers and wrist with no discomfort. She denies any recent fevers, chills or other systemic sx.  Her cat has kidney disease but is otherwise healthy. She occ takes him outside for walks on a leash but he is never free outside. He is up to date on rabies vaccination.             Review of Systems:   Full 12 point ROS obtained, pertinent positives and negatives as above.       Past Medical History:     Past Medical History:   Diagnosis Date     Asymptomatic postmenopausal status (age-related) (natural)      Past Surgical History:   Procedure Laterality Date     NO HISTORY OF SURGERY           Allergies:      Allergies   Allergen Reactions     Amoxicillin Hives            Current Antimicrobials:   Ceftriaxone, start 7/9  Clindamycin, start 7/8    Prior:  Metronidazole 7/9  Doxycycline 7/8-7/9       Family History:     Family History   Problem Relation Age of Onset     C.AROSALINDA. Maternal Grandmother      JUANITO. Maternal Grandfather         17 MI's per history     JUANITO. Paternal Grandmother      Diabetes Paternal Grandmother      DUYEN Paternal Grandfather      Diabetes Paternal Grandfather      Hypertension Mother      CAdalbertoAROSALINDA. Mother         pacemaker     Circulatory Father         Carotid artery  stenosis     Respiratory Father 75        COPD     Diabetes Paternal Aunt      Diabetes Paternal Uncle      Asthma No family hx of      Cerebrovascular Disease No family hx of      Breast Cancer No family hx of      Cancer - colorectal No family hx of      Prostate Cancer No family hx of             Social History:     Social History     Socioeconomic History     Marital status: Single     Spouse name: Not on file     Number of children: 0     Years of education: Not on file     Highest education level: Not on file   Occupational History     Employer: [employer]   Social Needs     Financial resource strain: Not on file     Food insecurity:     Worry: Not on file     Inability: Not on file     Transportation needs:     Medical: Not on file     Non-medical: Not on file   Tobacco Use     Smoking status: Former Smoker     Types: Cigarettes     Smokeless tobacco: Never Used   Substance and Sexual Activity     Alcohol use: Yes     Comment: 5 times per year     Drug use: No     Sexual activity: Yes     Partners: Female   Lifestyle     Physical activity:     Days per week: Not on file     Minutes per session: Not on file     Stress: Not on file   Relationships     Social connections:     Talks on phone: Not on file     Gets together: Not on file     Attends Quaker service: Not on file     Active member of club or organization: Not on file     Attends meetings of clubs or organizations: Not on file     Relationship status: Not on file     Intimate partner violence:     Fear of current or ex partner: Not on file     Emotionally abused: Not on file     Physically abused: Not on file     Forced sexual activity: Not on file   Other Topics Concern     Parent/sibling w/ CABG, MI or angioplasty before 65F 55M? No     Comment: father   Social History Narrative     Not on file              Physical Exam:   Ranges forvital signs:  Temp:  [95.6  F (35.3  C)-98.3  F (36.8  C)] 97.5  F (36.4  C)  Pulse:  [62-76]  65  Heart Rate:  [63-73] 73  Resp:  [10-20] 15  BP: (145-196)/(68-98) 163/74  SpO2:  [92 %-100 %] 93 %  GENERAL:  well-developed, well-nourished, sitting in bed in no acute distress.   ENT:  Head is normocephalic, atraumatic. Oropharynx is moist without exudates or ulcers.  EYES:  Eyes have anicteric sclerae.    NECK:  Supple.  LUNGS:  Breathing comfortably on RA  ABDOMEN:  Nondistended, soft, nontender.  EXT: LE warm and without edema. Left hand wrapped. Finger tips warm. Able to move all 5 digits without marked discomfort. Full ROM at wrist. No swelling/tenderness in forearm.   SKIN: Unable to fully examine left hand (wrapped). No visible rashes/lesions. PIV is in place without any surrounding erythema.  NEUROLOGIC:  Grossly nonfocal.         Laboratory Data:   Reviewed.  Pertinent for:  WBC 7.1  CRP 27  Cr 0.86    Culture data:  Intraoperative cx 7/9 pending         Imaging:   Left hand xray 7/9:   1. No acute osseous abnormality.  2. Soft tissue swelling of the left index finger.

## 2019-07-10 NOTE — PLAN OF CARE
VSS. Hypertensive, improved after amlodipine. Pt denies pain. Denies nausea. Tolerating small amounts regular diet. PIV saline locked. Left hand wrapped with bandage and koban. Small amount of dried bloody drainage seen through wrap. Up with SBA. Voiding spont, good amounts. Pt resting comfortably. Continue POC.

## 2019-07-11 ENCOUNTER — DOCUMENTATION ONLY (OUTPATIENT)
Dept: CARE COORDINATION | Facility: CLINIC | Age: 64
End: 2019-07-11

## 2019-07-11 ENCOUNTER — PATIENT OUTREACH (OUTPATIENT)
Dept: CARE COORDINATION | Facility: CLINIC | Age: 64
End: 2019-07-11

## 2019-07-11 ENCOUNTER — TELEPHONE (OUTPATIENT)
Dept: ORTHOPEDICS | Facility: CLINIC | Age: 64
End: 2019-07-11

## 2019-07-11 VITALS
BODY MASS INDEX: 44.41 KG/M2 | HEIGHT: 60 IN | DIASTOLIC BLOOD PRESSURE: 77 MMHG | SYSTOLIC BLOOD PRESSURE: 173 MMHG | OXYGEN SATURATION: 99 % | RESPIRATION RATE: 16 BRPM | TEMPERATURE: 97 F | HEART RATE: 65 BPM | WEIGHT: 226.2 LBS

## 2019-07-11 LAB
BACTERIA SPEC CULT: NO GROWTH
SPECIMEN SOURCE: NORMAL

## 2019-07-11 PROCEDURE — 40000556 ZZH STATISTIC PERIPHERAL IV START W US GUIDANCE

## 2019-07-11 PROCEDURE — 25000125 ZZHC RX 250: Performed by: STUDENT IN AN ORGANIZED HEALTH CARE EDUCATION/TRAINING PROGRAM

## 2019-07-11 RX ORDER — CEFUROXIME AXETIL 500 MG/1
500 TABLET ORAL 2 TIMES DAILY
Qty: 28 TABLET | Refills: 0 | Status: SHIPPED | OUTPATIENT
Start: 2019-07-11 | End: 2019-08-09

## 2019-07-11 RX ORDER — CLINDAMYCIN HCL 300 MG
300 CAPSULE ORAL 3 TIMES DAILY
Qty: 42 CAPSULE | Refills: 0 | Status: SHIPPED | OUTPATIENT
Start: 2019-07-11 | End: 2019-08-09

## 2019-07-11 RX ORDER — AMLODIPINE BESYLATE 5 MG/1
5 TABLET ORAL DAILY
Qty: 30 TABLET | Refills: 0 | Status: SHIPPED | OUTPATIENT
Start: 2019-07-12 | End: 2020-11-13

## 2019-07-11 RX ADMIN — CLINDAMYCIN IN 5 PERCENT DEXTROSE 900 MG: 18 INJECTION, SOLUTION INTRAVENOUS at 00:04

## 2019-07-11 RX ADMIN — CLINDAMYCIN IN 5 PERCENT DEXTROSE 900 MG: 18 INJECTION, SOLUTION INTRAVENOUS at 07:46

## 2019-07-11 ASSESSMENT — ACTIVITIES OF DAILY LIVING (ADL)
ADLS_ACUITY_SCORE: 10

## 2019-07-11 NOTE — PROVIDER NOTIFICATION
Notified Resident at 0746  regarding /77.      Spoke with: resident on call    Orders were not obtained.    Comments: pt declined use of norvasc. Stated she doesn't take this at home. Instructed pt to talk to her primary care provider about HTN

## 2019-07-11 NOTE — PLAN OF CARE
A&OX4. OVSS. POD2 of Left index finger irrigation and debridement, skin tissue, fascia and tendon sheath (per MD note). Patient denied pain all night. Wound care done. Left hand wrapped in bandage. PIV removed, new PIV place is saline locked in between antibiotics. SBA assist. Voiding spontaneously.  Continue with plan of care.     Addendum: patient had a loose watery BM this AM.

## 2019-07-11 NOTE — PROGRESS NOTES
[facility] ID Service: Follow up Note     Patient:  Theodora Hamilton, Date of birth 1955, Medical record number [id number]  Date of Visit:  July 11, 2019         Assessment and Recommendations:   ID Problem List:  - Left index finger flexor tenosynovitis after cat bite, status post I&D 7/9. Cx NGTD  - Reported allergy to amoxicillin (rash)        Recommendations:  - Okay to discharge with cefuroxime 500mg po bid + clindamycin 300mg po tid with plans to treat for 14 days.   - Please provide her with our clinic number at time of discharge ([phone]).   - Follow up with me in the [facility] ID clinic on Tuesday, July 16th at 12pm.     Discussion: 63 yo previously healthy woman who sustained a cat bite to the left index finger which progressed on po abx (doxy/clinda), now status post I&D 7/9. Gross purulence was noted intraoperatively but cultures have remained negative to date. Given that she had appropriate washout and is improving on current therapy, okay transitioning to empiric oral abx as above. Will see her in clinic early next week to ensure ongoing improvement. She was counseled about potential abx side effects as well as signs/sx to watch out for that may suggest worsening infection.     ID will sign off but don't hesitate to call with questions.     Attestation:  I have reviewed today's vital signs, medications, labs and imaging.  Alice Oliver MD  Pager [phone]         Interval History:   No issues overnight. Feeling ready to go home. Denies fevers, chills. No pain in hand - just tightness from ongoing swelling.            Review of Systems:   9 point ROS obtained, pertinent positives and negatives as above.      Allergies:   Amoxicillin (rash)         Current Antimicrobials:   Ceftriaxone, start 7/9  Clindamycin, start 7/8    Prior:  Metronidazole 7/9  Doxycycline 7/8-7/9         Physical Exam:   Ranges forvital signs:  Temp:  [95.9  F (35.5  C)-97  F (36.1  C)] 97  F (36.1  C)  Heart  Rate:  [59-73] 65  Resp:  [16-18] 16  BP: (133-173)/(59-85) 173/77  SpO2:  [96 %-99 %] 99 %  GENERAL:  well-developed, well-nourished, sitting in bed in no acute distress.   ENT:  Head is normocephalic, atraumatic.   EYES:  Eyes have anicteric sclerae.    NECK:  Supple.  LUNGS:  Breathing comfortably on RA  ABDOMEN:  Nondistended  EXT: lower extremities warm and without edema. Left hand bandaged, surrounding skin without erythema, decreased swelling.   SKIN: No new rashes/lesions/erythema. PIV site healthy appearing.   NEUROLOGIC:  Grossly nonfocal.         Laboratory Data:   No new labs today.     Culture data:  Intraoperative cx 7/9 NGTD         Imaging:   Left hand xray 7/9:   1. No acute osseous abnormality.  2. Soft tissue swelling of the left index finger.

## 2019-07-11 NOTE — PLAN OF CARE
VSS. Pt up ad junior. Denies pain. Hand soak and wound cares complete, will need 1 more soak tonight. PIV saline locked between IV ABX. Tolerating regular diet. Voids spont, not saving. Cont. POC.

## 2019-07-11 NOTE — PROGRESS NOTES
Discharge Note  Lines/drains: removed PIV  Medications: went over discharge medication instructions   Teaching: went over discharge instructions and follow up  Transportation: pt left 7c via foot to meet ride at the door

## 2019-07-11 NOTE — DISCHARGE SUMMARY
ORTHOPAEDIC DISCHARGE SUMMARY    Date of Admission: 7/9/2019  Date of Discharge: 7/11/2019  Disposition: Home  Staff Physician:  Dr. Whelan    DISCHARGE DIAGNOSIS:   1. Left index finger flexor tenosynovitis    2. Hypertension   3. Morbid obesity (not clinically significant for primary admission diagnosis)     PROCEDURES:    7/9/2019: Left index finger irrigation and debridement     HOSPITAL COURSE:   64 year old year old female who presented to the emergency department on 7/9/19 with left index finger pain and swelling.  Patient sustained a cat bite injury to the palm of her left index finger 2 days prior to presentation.  She was attempting to administer medications to her cat when it bit her index finger.  She was initially seen in the hospital urgent care and was given oral doxycycline and clindamycin.  She had worsening in the last 24 hours after initiation of this oral antibiotic regimen and therefore presented for repeat evaluation.  Findings on evaluation by the orthopedic team was consistent with pyogenic left index finger flexor tenosynovitis.  Discussion on recommendations of treatment included recommendation for surgical irrigation and debridement of the left index finger along with admission for empiric IV antibiotics pending culture results and monitoring of her finger clinically. Patient elected to undergo the procedures listed above on 7/9/2019 after having treatment options, alternatives, risks, and benefits explained.   Surgery was without complication and purulence was return from within the tendon sheath. She was admitted to the hospital for post operative monitoring and IV abx; she was placed on ceftriaxone and clindamycin empirically per ID who were consulted to assist in abx selection and duration. Patient did well post operatively with appropriate healing and improvement in her symptoms.  Hospital course was without complication.  At time of discharge, patient was tolerating PO pain control,  voiding, and eating a pre-operative diet.  Patient was discharged to home on cefuroxime and clindamycin PO per ID recommendations.    FOLLOW UP:      Follow up with Dr. Whelan in 1 week for wound check and initiation of hand therapy    [facility] on Tuesday, July 16th at 12pm.     PCP for management of hypertension.      ORTHOPEDICS - Located 4th Floor (4D ) in the Clinics and Surgery Center at [address] [place] [address].  parking is very convenient and highly recommended. Both  and self parkers should enter the main arrival plaza from [facility]; parking attendants will direct you based on your parking preference.     Department Address: [address] [place] [id number]     [facility] Orthopedic Scheduling contact number: [phone]    Call if you haven't heard regarding these appointments within 7 days of discharge.      PHYSICAL EXAMINATION:  See progress note on day of discharge for exam.             Discharge Procedure Orders   OCCUPATIONAL THERAPY REFERRAL   Standing Status: Future   Referral Priority: Routine Referral Type: Occupational Therapy   Number of Visits Requested: 1     Reason for your hospital stay   Order Comments: Left index finger flexor tenosynovitis     Adult [facility] Follow-up and recommended labs and tests   Order Comments: Follow up with Dr. Whelan in 1 week for wound check and initiation of hand therapy. You will be contacted by clinic on time and date of appointment.      ORTHOPEDICS - Located 4th Floor (4D) in the [facility] Center at [address] [place] [address].  parking is very convenient and highly recommended. Both  and self parkers should enter the main arrival plaza from [facility]; parking attendants will direct you based on your parking preference.     Department Address: [address] [place] [id number]     [facility] Orthopedic Scheduling  contact number: [phone]    Call if you haven't heard regarding these appointments within 7 days of discharge.        ID follow up:   [place]. Oliver in [place] [facility] [place] Tuesday, July 16th at 12pm.     Activity   Order Comments: Your activity upon discharge: activity as tolerated; recommend no heavy lifting or stressing left index finger until wounds have healed. Continue with active and passive range of motion exercises for fingers as directed by the occupational hand therapist.     Order Specific Question Answer Comments   Is discharge order? Yes      Wound care and dressings   Order Comments: Instructions to care for your wound at home: continue with soaking hand 1-2 times daily for 20 minutes at a time in warm soapy water. Keep incisions covered with clean dry dressings between soaks.     When to contact your care team   Order Comments: CALL YOUR PHYSICIAN IF:  1.  Your pain begins to worsen and is unable to be controlled with your medications.  2.  Excessive redness or drainage of cloudy or bloody material from the wounds   3.  You have a temperature elevation greater than 101.5    4.  You have pain, swelling or redness in your calf.   5. Please call or seek medical attention for pain that continues to worsen, new onset of numbness or tingling, or extreme swelling.  6. Please see a medical provider for new onset of chest pain or shortness of breath. This may be a sign of a heart attack or blood clot in your lungs.     You many call your physician at [phone] during business hours.    For after hours or on weekends, you may call the hospital at [phone] and ask for the orthopedic resident on call.     Diet   Order Comments: Follow this diet upon discharge: Regular Diet Adult     Order Specific Question Answer Comments   Is discharge order? Yes      Alec Cornell MD  Orthopedic Surgery PGY-4    Please contact me directly regarding this patient during normal business hours  Mon-Fri before 5pm @ [phone].  IF it is Night, a Weekend, or there is No Response, then please page the Orthopaedic Resident On Call in [facility]. Thank you!

## 2019-07-11 NOTE — TELEPHONE ENCOUNTER
----- Message from Alec Cornell MD sent at 7/11/2019 10:11 AM CDT -----  Patient underwent I&D of L index finger on 7/9/19 for pyogenic flexor tenosynovitis with Dr. Whelan. Doing well post op and discharging from hospital today. Will need to follow up in 1 week for wound check and initiation of hand therapy.     Thanks!  Alec

## 2019-07-11 NOTE — PROGRESS NOTES
Orthopaedic Surgery Progress Note   July 10, 2019    Subjective: No acute events overnight. Hypertensive but patient refusing PRN antihypertensives. Denies pain. Has been completing soaks without issues. No numbness or tingling. Denies fever or chills, CP, SOB, nausea, vomiting. No concerns this AM. Hoping for discharge to home today.     Objective: /77 (BP Location: Left arm)   Pulse 65   Temp 97  F (36.1  C) (Oral)   Resp 16   Ht 1.524 m (5')   Wt 102.6 kg (226 lb 3.2 oz)   LMP  (LMP Unknown)   SpO2 99%   Breastfeeding? No   BMI 44.18 kg/m      General: NAD, alert and oriented, cooperative with exam.   Cardio: RRR, extremities wwp.   Respiratory: Non-labored breathing.  MSK: Focused examination of LUE: patient soaking hand at time of visit;  L index finger with well healing incisions, sutures in place. No purulence noted. No significant redness. No TTP. Passive and actively flexes and extends without pain. Fingers wwp, radial pulse 2+, bcr in all digits. SILT to m/u/r distribution with normal sensation to radial and ulnar digital nerves to the left index finger.     Labs:  Lab Results   Component Value Date    WBC 7.1 07/09/2019    HGB 13.5 07/09/2019     07/09/2019       Assessment and Plan: Theodora Hamilton is a 64 year old female with L index finger FTS after cat bites now s/p L index finger I&D on 7/9/19 with Dr. Whelan. Doing well post-operatively.     Orthopedics Primary  Activity: Up as tolerated; encourage active and passive ROM of wrist and fingers to prevent stiffness.   Weight bearing status: WBAT LUE.  Pain management: PRN PO meds; has not required narcotic medications.   Antibiotics: plan to transition to cefuroxime 500 mg POD BID and clindamycin 300 mg PO TID x 14 days per ID.    Diet: regular    DVT prophylaxis: ambulatory, mechanical while in the hospital  Dressings: TID soaks in warm soapy water x 20 minutes today and replace with dry dressing after soaks.   Elevation:  Elevate LUE on pillows to keep above the level of the heart as much as possible.   Cultures: Pending, follow culture results closely.    Consults: ID to assist in abx selection and duration; appreciate assistance.   Follow-up: Clinic with Dr. Harmon in 1 week for repeat wound check and hand therapy visit.    Disposition: plan for discharge today after evaluation by MASON Cornell MD  Orthopedic Surgery PGY-4  [phone]   For questions about this patient during weekday business hours, please attempt to contact me at my pager prior to contacting the Orthopaedic Surgery resident on call. On the weekends and overnight, please page the Orthopaedic Surgery resident on call. Thank you!

## 2019-07-15 LAB
BACTERIA SPEC CULT: NO GROWTH
BACTERIA SPEC CULT: NO GROWTH
Lab: NORMAL
Lab: NORMAL
SPECIMEN SOURCE: NORMAL
SPECIMEN SOURCE: NORMAL

## 2019-07-15 NOTE — PROGRESS NOTES
Infectious Diseases Clinic Follow Up Note       Patient:  Theodora Hamilton, Date of birth 1955, Medical record number [id number]  Date of Visit:  July 16, 2019    HISTORY OF PRESENT ILLNESS: Ms. Hamilton is a very pleasant 65 yo female with no significant past medical history who was I initially met during her hospitalization last week for management of left index finger flexor tenosynovitis after a cat bite. She underwent I&D on 7/9 and was empirically treated with ceftriaxone/clindamycin. Intraoperative cx returned negative so she was discharged on empiric cefuroxime + clindamycin on 7/11.  She returns today for follow up.    Today she reports doing well. She has been taking her abx as prescribed and denies any side effects. Her finger continues to heal - she has noticed less pain and decreased swelling in the hand (although she thinks the finger itself may be a bit more swollen over the past few days). No systemic symptoms. She has been soaking the wounds twice daily and keeping the hand wrapped.      ROS: Full 12 point ROS obtained, pertinent positives and negatives as above.    PAST MEDICAL HISTORY:  Past Medical History:   Diagnosis Date     Asymptomatic postmenopausal status (age-related) (natural)      Past Surgical History:   Procedure Laterality Date     INCISION AND DRAINAGE FINGER, COMBINED Left 7/9/2019    Procedure: Incision and Drainage of Left Index Finger;  Surgeon: Lowell Whelan MD;  Location: [facility] OR     NO HISTORY OF SURGERY       ALLERGIES:  Allergies   Allergen Reactions     Amoxicillin Hives     ANTIMICROBIALS:  Cefuroxime, start 7/11  Clindamycin po, start 7/11    FAMILY/SOCIAL HISTORY: Reviewed and unchanged.     EXAM:  Vitals: /82   Pulse 63   Temp 97.8  F (36.6  C) (Oral)   Wt 103.5 kg (228 lb 1.6 oz)   LMP  (LMP Unknown)   SpO2 99%   BMI 44.55 kg/m    BMI= Body mass index is 44.55 kg/m .  GENERAL:  well-developed, well-nourished,no acute distress.   ENT:  Head is  normocephalic, atraumatic.   EYES:  Eyes have anicteric sclerae.    NECK:  Supple.  LUNGS:  Breathing comfortably on RA  ABDOMEN:  Nondistended, soft, nontender.  EXT: Left index finger with diffuse, mild swelling and minimal erythema. Surgical wounds healing over; no drainage or bleeding. Minimal tenderness. Other fingers unremarkable.   NEURO: Grossly intact.     LABORATORY DATA:  Obtained 7/16: CRP <2.9, WBC 7.8    Culture data:  Intraoperative cx 7/9 NGTD    IMAGING:  Left hand xray 7/9:   1. No acute osseous abnormality.  2. Soft tissue swelling of the left index finger.    ASSESSMENT AND PLAN: 63 yo previously healthy woman who sustained a cat bite to the left index finger which progressed on po abx, requiring I&D on 7/9. Gross purulence was noted intraoperatively but cultures have remained negative (presumably due to prior abx exposure). She has had continued improvement on oral antibiotics. Labs today very reassuring.   - Stop cefuroxime/clindamycin after 2 week course complete (stop date 7/23).  - Follow up with orthopedics later this week as scheduled.   - No need for additional ID follow up unless new issues arise.     Ms. Hamilton has our clinic number and knows to get in touch with questions/concerns.     Alice Oliver MD  [phone]

## 2019-07-16 ENCOUNTER — OFFICE VISIT (OUTPATIENT)
Dept: INFECTIOUS DISEASES | Facility: CLINIC | Age: 64
End: 2019-07-16
Attending: COLON & RECTAL SURGERY
Payer: COMMERCIAL

## 2019-07-16 VITALS
BODY MASS INDEX: 44.55 KG/M2 | OXYGEN SATURATION: 99 % | DIASTOLIC BLOOD PRESSURE: 82 MMHG | SYSTOLIC BLOOD PRESSURE: 153 MMHG | WEIGHT: 228.1 LBS | TEMPERATURE: 97.8 F | HEART RATE: 63 BPM

## 2019-07-16 DIAGNOSIS — A48.8 OTHER SPECIFIED BACTERIAL DISEASES: ICD-10-CM

## 2019-07-16 DIAGNOSIS — M65.949 TENOSYNOVITIS OF FINGER: Primary | ICD-10-CM

## 2019-07-16 DIAGNOSIS — M65.949 TENOSYNOVITIS OF FINGER: ICD-10-CM

## 2019-07-16 DIAGNOSIS — M65.10 INFECTIOUS TENOSYNOVITIS: ICD-10-CM

## 2019-07-16 LAB
BACTERIA SPEC CULT: NORMAL
CRP SERPL-MCNC: <2.9 MG/L (ref 0–8)
ERYTHROCYTE [DISTWIDTH] IN BLOOD BY AUTOMATED COUNT: 13.7 % (ref 10–15)
HCT VFR BLD AUTO: 47.9 % (ref 35–47)
HGB BLD-MCNC: 15.1 G/DL (ref 11.7–15.7)
Lab: NORMAL
MCH RBC QN AUTO: 29.3 PG (ref 26.5–33)
MCHC RBC AUTO-ENTMCNC: 31.5 G/DL (ref 31.5–36.5)
MCV RBC AUTO: 93 FL (ref 78–100)
PLATELET # BLD AUTO: 224 10E9/L (ref 150–450)
RBC # BLD AUTO: 5.16 10E12/L (ref 3.8–5.2)
SPECIMEN SOURCE: NORMAL
WBC # BLD AUTO: 7.8 10E9/L (ref 4–11)

## 2019-07-16 PROCEDURE — 85027 COMPLETE CBC AUTOMATED: CPT | Performed by: COLON & RECTAL SURGERY

## 2019-07-16 PROCEDURE — 36415 COLL VENOUS BLD VENIPUNCTURE: CPT | Performed by: COLON & RECTAL SURGERY

## 2019-07-16 PROCEDURE — 86140 C-REACTIVE PROTEIN: CPT | Performed by: COLON & RECTAL SURGERY

## 2019-07-16 PROCEDURE — G0463 HOSPITAL OUTPT CLINIC VISIT: HCPCS | Mod: ZF

## 2019-07-16 ASSESSMENT — PAIN SCALES - GENERAL: PAINLEVEL: NO PAIN (0)

## 2019-07-16 NOTE — NURSING NOTE
Chief Complaint   Patient presents with     [facility] f/u     Vital signs:  Temp: 97.8  F (36.6  C) Temp src: Oral BP: 153/82 Pulse: 63     SpO2: 99 %       Weight: 103.5 kg (228 lb 1.6 oz)  Estimated body mass index is 44.55 kg/m  as calculated from the following:    Height as of 7/9/19: 1.524 m (5').    Weight as of this encounter: 103.5 kg (228 lb 1.6 oz).        Angelina Smith

## 2019-07-16 NOTE — LETTER
7/16/2019      RE: Theodora Hamilton  [address] [facility] [address]     Infectious Diseases Clinic Follow Up Note       Patient:  Theodora Hamilton, Date of birth 1955, Medical record number [id number]  Date of Visit:  July 16, 2019    HISTORY OF PRESENT ILLNESS: Ms. Hamilton is a very pleasant 65 yo female with no significant past medical history who was I initially met during her hospitalization last week for management of left index finger flexor tenosynovitis after a cat bite. She underwent I&D on 7/9 and was empirically treated with ceftriaxone/clindamycin. Intraoperative cx returned negative so she was discharged on empiric cefuroxime + clindamycin on 7/11.  She returns today for follow up.    Today she reports doing well. She has been taking her abx as prescribed and denies any side effects. Her finger continues to heal - she has noticed less pain and decreased swelling in the hand (although she thinks the finger itself may be a bit more swollen over the past few days). No systemic symptoms. She has been soaking the wounds twice daily and keeping the hand wrapped.      ROS: Full 12 point ROS obtained, pertinent positives and negatives as above.    PAST MEDICAL HISTORY:  Past Medical History:   Diagnosis Date     Asymptomatic postmenopausal status (age-related) (natural)      Past Surgical History:   Procedure Laterality Date     INCISION AND DRAINAGE FINGER, COMBINED Left 7/9/2019    Procedure: Incision and Drainage of Left Index Finger;  Surgeon: Lowell Whelan MD;  Location: [facility]     NO HISTORY OF SURGERY       ALLERGIES:  Allergies   Allergen Reactions     Amoxicillin Hives     ANTIMICROBIALS:  Cefuroxime, start 7/11  Clindamycin po, start 7/11    FAMILY/SOCIAL HISTORY: Reviewed and unchanged.     EXAM:  Vitals: /82   Pulse 63   Temp 97.8  F (36.6  C) (Oral)   Wt 103.5 kg (228 lb 1.6 oz)   LMP  (LMP Unknown)   SpO2 99%   BMI 44.55 kg/m     BMI= Body mass index is 44.55  kg/m .  GENERAL:  well-developed, well-nourished,no acute distress.   ENT:  Head is normocephalic, atraumatic.   EYES:  Eyes have anicteric sclerae.    NECK:  Supple.  LUNGS:  Breathing comfortably on RA  ABDOMEN:  Nondistended, soft, nontender.  EXT: Left index finger with diffuse, mild swelling and minimal erythema. Surgical wounds healing over; no drainage or bleeding. Minimal tenderness. Other fingers unremarkable.   NEURO: Grossly intact.     LABORATORY DATA:  Obtained 7/16: CRP <2.9, WBC 7.8    Culture data:  Intraoperative cx 7/9 NGTD    IMAGING:  Left hand xray 7/9:   1. No acute osseous abnormality.  2. Soft tissue swelling of the left index finger.    ASSESSMENT AND PLAN: 65 yo previously healthy woman who sustained a cat bite to the left index finger which progressed on po abx, requiring I&D on 7/9. Gross purulence was noted intraoperatively but cultures have remained negative (presumably due to prior abx exposure). She has had continued improvement on oral antibiotics. Labs today very reassuring.   - Stop cefuroxime/clindamycin after 2 week course complete (stop date 7/23).  - Follow up with orthopedics later this week as scheduled.   - No need for additional ID follow up unless new issues arise.     Ms. Hamilton has our clinic number and knows to get in touch with questions/concerns.     Alice Oliver MD  [phone]

## 2019-07-19 ENCOUNTER — OFFICE VISIT (OUTPATIENT)
Dept: ORTHOPEDICS | Facility: CLINIC | Age: 64
End: 2019-07-19
Payer: COMMERCIAL

## 2019-07-19 ENCOUNTER — THERAPY VISIT (OUTPATIENT)
Dept: OCCUPATIONAL THERAPY | Facility: CLINIC | Age: 64
End: 2019-07-19
Payer: COMMERCIAL

## 2019-07-19 DIAGNOSIS — M25.642 STIFFNESS OF FINGER JOINT, LEFT: Primary | ICD-10-CM

## 2019-07-19 DIAGNOSIS — M65.949 FLEXOR TENOSYNOVITIS OF FINGER: Primary | ICD-10-CM

## 2019-07-19 DIAGNOSIS — M65.949 FLEXOR TENOSYNOVITIS OF FINGER: ICD-10-CM

## 2019-07-19 PROCEDURE — 97110 THERAPEUTIC EXERCISES: CPT | Mod: GO | Performed by: OCCUPATIONAL THERAPIST

## 2019-07-19 PROCEDURE — 97165 OT EVAL LOW COMPLEX 30 MIN: CPT | Mod: GO | Performed by: OCCUPATIONAL THERAPIST

## 2019-07-19 PROCEDURE — 97112 NEUROMUSCULAR REEDUCATION: CPT | Mod: GO | Performed by: OCCUPATIONAL THERAPIST

## 2019-07-19 PROCEDURE — 97140 MANUAL THERAPY 1/> REGIONS: CPT | Mod: GO | Performed by: OCCUPATIONAL THERAPIST

## 2019-07-19 NOTE — PROGRESS NOTES
"Hand Therapy Initial Evaluation    Current Date:  7/19/2019    Diagnosis:  Left  index finger infection/cat bite  DOI:  7/6/19  DOS:  7/9/19  Procedure:  I&D  Post:  1w 3 d  Referring MD: Lowell Whelan MD    Next MD visit: about a month 8/9/16      Subjective:    Theodora Hamilton being seen for post op hand injury.   Problem began 7/6/2019. Where condition occurred: at home.Problem occurred: was medicating cat and got bit  and reported as 0/10 on pain scale.  Pertinent medical history includes:  None.    Surgeries include:  None.  Current medications:  High blood pressure medication and other. Other medications details: antibiotics.   Primary job tasks include:  Computer work, driving, lifting/carrying and prolonged sitting.     Since onset symptoms are rapidly improving.      Patient is . Restrictions include:  Working in normal job without restrictions.      Red flags:  None as reported by patient.    Occupational Profile Information:  Right hand dominant  Prior functional level:  no limitations  Patient reports symptoms of pain, stiffness/loss of motion and edema  Barriers include:none  Mobility: No difficulty  Transportation: drives  Leisure activities/hobbies: camping, traveling, cutting grass, gardening  Other: has one old liu \"Moon\"    Functional Outcome Measure:   7/19/2019  Upper Extremity Functional Index Score:  SCORE:   Column Totals: /80: 70   (A lower score indicates greater disability.)      Objective:  Pain Level (Scale 0-10):   7/19/2019   At Rest 0   With Use 0-3     Pain Description:  Date 7/19/2019   Location index finger   Pain Quality Tiring and stiff   Frequency intermittent or daily     Pain is worst  daytime   Exacerbated by  movement and pressure on the finger   Relieved by rest   Progression Improving since injury     Edema:  MILD  Circumference: (Measured in cm)  7/19/2019        Location: Index Long Ring Small Thumb    Right Left Right Left Right Left Right Left Right Left "   P1 6.7 7.2           PIP             P2             DIP                 Sensation:  WNL throughout all nerve distributions; per patient report    ROM  Hand 7/19/2019 7/19/2019   AROM(PROM) Right Left   Index MP 0/70 0/68   PIP 0/100 0/65   DIP 0/40 0/20   JOE           Strength:  contraindicated    Assessment:  Patient presents with symptoms consistent with diagnosis as above,  with surgical  intervention.     Patient's limitations or Problem List includes:  Pain, Decreased ROM/motion, Increased edema and Adherence in connective tissue of the left hand and index finger which interferes with the patient's ability to perform Self Care Tasks (dressing, eating, bathing, hygiene/toileting), Recreational Activities, Household Chores and Driving  as compared to previous level of function.    Rehab Potential:  Excellent - Return to full activity, no limitations    Patient will benefit from skilled Occupational Therapy to increase ROM, flexibility, overall strength,  strength, pinch strength, coordination and dexterity and decrease pain, edema and adherence of scarring to return to previous activity level and resume normal daily tasks and to reach their rehab potential.    Barriers to Learning:  No barrier    Communication Issues:  Patient appears to be able to clearly communicate and understand verbal and written communication and follow directions correctly.    Chart Review: Chart Review, Brief history including review of medical and/or therapy records relating to the presenting problem and Simple history review with patient    Identified Performance Deficits: bathing/showering, dressing, feeding, hygiene and grooming, driving and community mobility, home establishment and management, meal preparation and cleanup, work, play and leisure activities    Assessment of Occupational Performance:  5 or more Performance Deficits    Clinical Decision Making (Complexity): Low complexity    Treatment Explanation:  The following  has been discussed with the patient:  RX ordered/plan of care  Anticipated outcomes  Possible risks and side effects    Plan:  Frequency:  1 X week, once daily  Duration:  for 6 weeks    Treatment Plan:   Modalities:  US and Paraffin  Therapeutic Exercise:  AROM, AAROM, PROM, Tendon Gliding, Blocking, Reverse Blocking, Place and Hold, Extensor Tracking, Isotonics and Isometrics  Neuromuscular re-education:  Coordination/Dexterity, Desensitization, Kinesthetic Training and Proprioceptive Training  Manual Techniques:  Scar mobilization, Myofascial release and Manual edema mobilization  Orthotic Fabrication:  PRN  Discharge Plan:  Achieve all LTG.  Independent in home treatment program.  Reach maximal therapeutic benefit.    Home Exercise Program:  Finger A/AA/PROM   Blocking, tendon gliding  Table top and hook fist and ABD/ADD  Light scar massage  Coban for support/edema control    Next Visit:  progress

## 2019-07-19 NOTE — LETTER
7/19/2019       RE: Theodora Hamilton  [address] [address]     Dear Colleague,    Thank you for referring your patient, Theodora Hamilton, to the [facility]. Please see a copy of my visit note below.    Theodora is status post I&D of a cat bite left index finger.  Doing very well.  She is off her antibiotics.  Pain at this point, no fevers or chills.  She states she is had no drainage.    The past medical history was reviewed and documented in the chart.  This includes medications, surgeries, social history as well as review of systems.    Physical examination of the left hand demonstrates nicely healed incisions.  I did remove the sutures this morning.  Her finger is fairly swollen but FDS, FDP are fully intact as are the extensors.  She has full sensation distally on the radial and ulnar aspect of the digit.  Some fusiform swelling of the finger is noted which does limit her flexion.    Impression: Left index finger flexor tendon sheath infection, status post I&D    Plan: She needs to work on flexion and extension and edema control of the digit.  We will have her see therapy this morning.  Overall though I think she is doing very well.  No further indications for any surgical treatment.  I want her to increase use of the hand and finger as tolerated now.  I will plan on seeing her back in 3 to 4 weeks to reevaluate.    Again, thank you for allowing me to participate in the care of your patient.      Sincerely,    Lowell Whelan MD

## 2019-07-19 NOTE — NURSING NOTE
Reason For Visit:   Chief Complaint   Patient presents with     Surgical Followup     Incision and Drainage of Left Index Finger - Left, DOS: 7-9-2019.       Pain Assessment  Patient Currently in Pain: Denies        Allergies   Allergen Reactions     Amoxicillin Hives           Vilma Vega LPN

## 2019-07-26 ENCOUNTER — THERAPY VISIT (OUTPATIENT)
Dept: OCCUPATIONAL THERAPY | Facility: CLINIC | Age: 64
End: 2019-07-26
Payer: COMMERCIAL

## 2019-07-26 DIAGNOSIS — M25.642 STIFFNESS OF FINGER JOINT, LEFT: Primary | ICD-10-CM

## 2019-07-26 DIAGNOSIS — M65.949 FLEXOR TENOSYNOVITIS OF FINGER: ICD-10-CM

## 2019-07-26 PROCEDURE — 97110 THERAPEUTIC EXERCISES: CPT | Mod: GO | Performed by: OCCUPATIONAL THERAPIST

## 2019-07-26 PROCEDURE — 97140 MANUAL THERAPY 1/> REGIONS: CPT | Mod: GO | Performed by: OCCUPATIONAL THERAPIST

## 2019-07-26 NOTE — PROGRESS NOTES
SOAP note objective information for 7/26/2019.  Please refer to the daily flowsheet for treatment today, total treatment time and time spent performing 1:1 timed codes.   '    Diagnosis:  Left  index finger infection/cat bite  DOI:  7/6/19  DOS:  7/9/19  Procedure:  I&D  Post:  2w 3 d  Referring MD: Lowell Whelan MD    Next MD visit: about a month 8/9/16    Objective:  Pain Level (Scale 0-10):   7/19/2019 7/26/2019     At Rest 0 0   With Use 0-3 Can feel it with use     Pain Description:  Date 7/19/2019   Location index finger   Pain Quality Tiring and stiff   Frequency intermittent or daily     Pain is worst  daytime   Exacerbated by  movement and pressure on the finger   Relieved by rest   Progression Improving since injury     Edema:  MILD  Circumference: (Measured in cm)   7/19/2019 7/26/2019   Location: Index Index    Right Left Right Left   P1 6.7 7.2  7   PIP       P2       DIP         Sensation:  WNL throughout all nerve distributions; per patient report    ROM  Hand 7/19/2019 7/19/2019 7/26/2019     AROM(PROM) Right Left L   Index MP 0/70 0/68 0/75   PIP 0/100 0/65 0/80   DIP 0/40 0/20 0/35   JOE            Strength:  contraindicated    Home Exercise Program:  Finger A/AA/PROM   Blocking, tendon gliding  Table top and hook fist and ABD/ADD  Light scar massage  Coban for support/edema control  7/26/2019  Reinforced exercises, reviewed hook/marker, added composite ROM/printed  Scar massage with lotion    Next Visit:  progress

## 2019-08-02 ENCOUNTER — THERAPY VISIT (OUTPATIENT)
Dept: OCCUPATIONAL THERAPY | Facility: CLINIC | Age: 64
End: 2019-08-02
Payer: COMMERCIAL

## 2019-08-02 DIAGNOSIS — M65.949 FLEXOR TENOSYNOVITIS OF FINGER: ICD-10-CM

## 2019-08-02 DIAGNOSIS — M25.642 STIFFNESS OF FINGER JOINT, LEFT: Primary | ICD-10-CM

## 2019-08-02 PROCEDURE — 97140 MANUAL THERAPY 1/> REGIONS: CPT | Mod: GO | Performed by: OCCUPATIONAL THERAPIST

## 2019-08-02 PROCEDURE — 97110 THERAPEUTIC EXERCISES: CPT | Mod: GO | Performed by: OCCUPATIONAL THERAPIST

## 2019-08-02 NOTE — PROGRESS NOTES
SOAP note objective information for 8/2/2019.  Please refer to the daily flowsheet for treatment today, total treatment time and time spent performing 1:1 timed codes.   '    Diagnosis:  Left  index finger infection/cat bite  DOI:  7/6/19  DOS:  7/9/19  Procedure:  I&D  Post:  3w 3 d  Referring MD: Lowell Whelan MD    Next MD visit: about a month 8/9/16    Objective:  Pain Level (Scale 0-10):   7/19/2019 7/26/2019     At Rest 0 0   With Use 0-3 Can feel it with use     Pain Description:  Date 7/19/2019   Location index finger   Pain Quality Tiring and stiff   Frequency intermittent or daily     Pain is worst  daytime   Exacerbated by  movement and pressure on the finger   Relieved by rest   Progression Improving since injury     Edema:  MILD  Circumference: (Measured in cm)   7/19/2019 7/26/2019 8/2/2019     Location: Index Index     Right Left Right Left R   P1 6.7 7.2  7 6.5   PIP        P2        DIP          Sensation:  WNL throughout all nerve distributions; per patient report    ROM  Hand 7/19/2019 7/19/2019 7/26/2019 8/2/2019     AROM(PROM) Right Left L L   Index MP 0/70 0/68 0/75 /74   PIP 0/100 0/65 0/80 /92   DIP 0/40 0/20 0/35 /50 composite    /50 hook   JOE         Strength:  Pain-free /Pinch Test    8/2/2019   Trials R L   1   59 40     3 Pt Pinch  8/2/2019   Trials R L   1   9 8       Home Exercise Program:  Finger A/AA/PROM  In hook and composite fist  Blocking for DIP  scar massage  Coban for support/edema control   hook/marker  Gentle gripping    Next Visit:  Progress as indicated, more appts if needed / after MD appt

## 2019-08-09 ENCOUNTER — OFFICE VISIT (OUTPATIENT)
Dept: ORTHOPEDICS | Facility: CLINIC | Age: 64
End: 2019-08-09
Payer: COMMERCIAL

## 2019-08-09 DIAGNOSIS — M65.949 FLEXOR TENOSYNOVITIS OF FINGER: Primary | ICD-10-CM

## 2019-08-09 NOTE — NURSING NOTE
Reason For Visit:   Chief Complaint   Patient presents with     Follow Up     Incision and Drainage of Left Index Finger - Left, DOS: 7-9-2019.          Hand dominance: Right     Pain Assessment  Patient Currently in Pain: Yes  Primary Pain Location: (Left index finger)  Pain Descriptors: Tender        Allergies   Allergen Reactions     Amoxicillin Hives           Vilma Vega LPN

## 2019-08-09 NOTE — PROGRESS NOTES
Follow-up cat bite left index finger.  Doing very well.  No fevers or chills.  Pain steadily decreased, minimally painful now.  She is made nice progress in her therapy.    The past medical history was reviewed and documented in the chart.  This includes medications, surgeries, social history as well as review of systems.    Physical examination left hand demonstrates nicely healed incisions, some scar induration.  Motion is nearly.  No motor deficits.  No sensory deficits.  No motor, no sensory deficits are noted.  No significant atrophy.  There is brisk capillary refill in all digits and a palpable radial pulse.  No overlying skin changes noted.    Impression: Status post I&D flexor tenosynovitis left index finger    Plan: She is doing very well.  She will continue with her normal activities now.  I do not think she needs any formal therapy.  She should go on to a full recovery.  I will plan on seeing her back on an as-needed basis.  She does have my contact information should the need arise.

## 2019-08-09 NOTE — LETTER
8/9/2019       RE: Theodora Hamilton  [address] [facility] [address]     Dear Colleague,    Thank you for referring your patient, Theodora Hamilton, to the [facility]. Please see a copy of my visit note below.    Follow-up cat bite left index finger.  Doing very well.  No fevers or chills.  Pain steadily decreased, minimally painful now.  She is made nice progress in her therapy.    The past medical history was reviewed and documented in the chart.  This includes medications, surgeries, social history as well as review of systems.    Physical examination left hand demonstrates nicely healed incisions, some scar induration.  Motion is nearly.  No motor deficits.  No sensory deficits.  No motor, no sensory deficits are noted.  No significant atrophy.  There is brisk capillary refill in all digits and a palpable radial pulse.  No overlying skin changes noted.    Impression: Status post I&D flexor tenosynovitis left index finger    Plan: She is doing very well.  She will continue with her normal activities now.  I do not think she needs any formal therapy.  She should go on to a full recovery.  I will plan on seeing her back on an as-needed basis.  She does have my contact information should the need arise.    Again, thank you for allowing me to participate in the care of your patient.      Sincerely,    Lowell Whelan MD

## 2019-10-11 ENCOUNTER — OFFICE VISIT (OUTPATIENT)
Dept: FAMILY MEDICINE | Facility: CLINIC | Age: 64
End: 2019-10-11
Payer: COMMERCIAL

## 2019-10-11 VITALS
DIASTOLIC BLOOD PRESSURE: 80 MMHG | OXYGEN SATURATION: 99 % | WEIGHT: 225 LBS | HEART RATE: 64 BPM | BODY MASS INDEX: 44.17 KG/M2 | TEMPERATURE: 98.4 F | HEIGHT: 60 IN | SYSTOLIC BLOOD PRESSURE: 148 MMHG

## 2019-10-11 DIAGNOSIS — Z12.11 SPECIAL SCREENING FOR MALIGNANT NEOPLASMS, COLON: ICD-10-CM

## 2019-10-11 DIAGNOSIS — Z12.31 ENCOUNTER FOR SCREENING MAMMOGRAM FOR BREAST CANCER: ICD-10-CM

## 2019-10-11 DIAGNOSIS — E66.01 MORBID OBESITY (H): ICD-10-CM

## 2019-10-11 DIAGNOSIS — I10 BENIGN ESSENTIAL HYPERTENSION: Primary | ICD-10-CM

## 2019-10-11 DIAGNOSIS — Z13.6 CARDIOVASCULAR SCREENING; LDL GOAL LESS THAN 100: ICD-10-CM

## 2019-10-11 DIAGNOSIS — Z87.891 PERSONAL HISTORY OF TOBACCO USE: ICD-10-CM

## 2019-10-11 PROCEDURE — 80061 LIPID PANEL: CPT | Performed by: FAMILY MEDICINE

## 2019-10-11 PROCEDURE — 36415 COLL VENOUS BLD VENIPUNCTURE: CPT | Performed by: FAMILY MEDICINE

## 2019-10-11 PROCEDURE — 90662 IIV NO PRSV INCREASED AG IM: CPT | Performed by: FAMILY MEDICINE

## 2019-10-11 PROCEDURE — 90670 PCV13 VACCINE IM: CPT | Performed by: FAMILY MEDICINE

## 2019-10-11 PROCEDURE — 80053 COMPREHEN METABOLIC PANEL: CPT | Performed by: FAMILY MEDICINE

## 2019-10-11 PROCEDURE — 99214 OFFICE O/P EST MOD 30 MIN: CPT | Mod: 25 | Performed by: FAMILY MEDICINE

## 2019-10-11 PROCEDURE — G0008 ADMIN INFLUENZA VIRUS VAC: HCPCS | Performed by: FAMILY MEDICINE

## 2019-10-11 PROCEDURE — G0009 ADMIN PNEUMOCOCCAL VACCINE: HCPCS | Performed by: FAMILY MEDICINE

## 2019-10-11 RX ORDER — AMLODIPINE BESYLATE 5 MG/1
5 TABLET ORAL DAILY
Qty: 30 TABLET | Refills: 0 | Status: CANCELLED | OUTPATIENT
Start: 2019-10-11

## 2019-10-11 RX ORDER — LISINOPRIL 20 MG/1
20 TABLET ORAL DAILY
Qty: 90 TABLET | Refills: 3 | Status: SHIPPED | OUTPATIENT
Start: 2019-10-11 | End: 2020-11-13

## 2019-10-11 ASSESSMENT — MIFFLIN-ST. JEOR: SCORE: 1487.09

## 2019-10-11 NOTE — PROGRESS NOTES
Subjective     Theodora Hamilton is a 64 year old female who presents to clinic today for the following health issues:    HPI     Hypertension Follow-up      Do you check your blood pressure regularly outside of the clinic? Yes -- Was given Norvasc in the hospital in July and patient only took 1 month-- wondering if she needs to continue taking?    Are you following a low salt diet? No    Are your blood pressures ever more than 140 on the top number (systolic) OR more   than 90 on the bottom number (diastolic), for example 140/90? Yes      How many servings of fruits and vegetables do you eat daily?  0-1    On average, how many sweetened beverages do you drink each day (soda, juice, sweet tea, etc)?   1    How many days per week do you miss taking your medication? 0      Patient Active Problem List   Diagnosis     Asymptomatic postmenopausal status     OA (Osteoarthritis) - left great toe     HYPERLIPIDEMIA LDL GOAL <160     Obesity     Flexor tenosynovitis of finger     Stiffness of finger joint, left     Morbid obesity (H)     Past Surgical History:   Procedure Laterality Date     COLONOSCOPY       INCISION AND DRAINAGE FINGER, COMBINED Left 7/9/2019    Procedure: Incision and Drainage of Left Index Finger;  Surgeon: Lowell Whelan MD;  Location:  OR     NO HISTORY OF SURGERY         Social History     Tobacco Use     Smoking status: Former Smoker     Packs/day: 0.00     Years: 0.00     Pack years: 0.00     Types: Cigarettes     Smokeless tobacco: Never Used   Substance Use Topics     Alcohol use: Yes     Comment: 5 times per year     Family History   Problem Relation Age of Onset     C.A.D. Maternal Grandmother      C.A.D. Maternal Grandfather         17 MI's per history     C.A.D. Paternal Grandmother      Diabetes Paternal Grandmother      CAdalbertoAROSALINDA. Paternal Grandfather      Diabetes Paternal Grandfather      Hypertension Mother      C.A.D. Mother         pacemaker     Circulatory Father         Carotid  artery stenosis     Respiratory Father 75        COPD     Coronary Artery Disease Father      Diabetes Paternal Aunt      Diabetes Paternal Uncle      Coronary Artery Disease Brother      Asthma No family hx of      Cerebrovascular Disease No family hx of      Breast Cancer No family hx of      Cancer - colorectal No family hx of      Prostate Cancer No family hx of            Reviewed and updated as needed this visit by Provider         Review of Systems   ROS COMP: Constitutional, HEENT, cardiovascular, pulmonary, gi and gu systems are negative, except as otherwise noted.      Objective    BP (!) 148/80 (BP Location: Right arm, Patient Position: Sitting, Cuff Size: Adult Large)   Pulse 64   Temp 98.4  F (36.9  C) (Oral)   Ht 1.524 m (5')   Wt 102.1 kg (225 lb)   LMP  (LMP Unknown)   SpO2 99%   BMI 43.94 kg/m    Body mass index is 43.94 kg/m .  Physical Exam   GENERAL: healthy, alert and no distress  RESP: lungs clear to auscultation - no rales, rhonchi or wheezes  CV: regular rate and rhythm, normal S1 S2, no S3 or S4, no murmur, click or rub, no peripheral edema and peripheral pulses strong          Assessment & Plan     1. Benign essential hypertension  - Patient was started on amlodipine when she was hospitalized for a surgery in July 2019, but then stopped taking it after a month  - BPs at home sometimes up to 170s systolic  - Advised re-starting medication  - Will switch to lisinopril since she reported some swelling with the amlodipine previously   - Comprehensive metabolic panel  - lisinopril (PRINIVIL/ZESTRIL) 20 MG tablet; Take 1 tablet (20 mg) by mouth daily  Dispense: 90 tablet; Refill: 3    2. Morbid obesity (H)  - Advised working on weight loss     3. Encounter for screening mammogram for breast cancer  - MA SCREENING DIGITAL BILAT - Future  (s+30); Future    4. Special screening for malignant neoplasms, colon  - GASTROENTEROLOGY ADULT REF PROCEDURE ONLY [facility] [phone]    5.  CARDIOVASCULAR SCREENING; LDL GOAL LESS THAN 100  - Lipid panel reflex to direct LDL Fasting    6. Personal history of tobacco use  - Patient quit smoking when she was a teenager  - She is not a candidate for lung cancer screening         Return in about 4 weeks (around 11/8/2019) for BP Recheck.    Janae North DO  [facility]

## 2019-10-11 NOTE — PATIENT INSTRUCTIONS
Patient Education     Controlling High Blood Pressure  High blood pressure (hypertension) is often called the silent killer. This is because many people who have it don t know it. High blood pressure can raise your risk of heart attack, stroke, and heart failure. Controlling your blood pressure can decrease your risk of these problems. Know your blood pressure and remember to check it regularly. Doing so can save your life.  Blood pressure measurements are given as 2 numbers. Systolic blood pressure is the upper number. This is the pressure when the heart contracts. Diastolic blood pressure is the lower number. This is the pressure when the heart relaxes between beats.  Blood pressure is categorized as normal, elevated, or stage 1 or stage 2 high blood pressure:    Normal blood pressure is systolic of less than 120 and diastolic of less than 80 (120/80)    Elevated blood pressure is systolic of 120 to 129 and diastolic less than 80    Stage 1 high blood pressure is systolic is 130 to 139 or diastolic between 80 to 89    Stage 2 high blood pressure is when systolic is 140 or higher or the diastolic is 90 or higher  Here are some things you can do to help control your blood pressure.  Choose heart-healthy foods    Select low-salt, low-fat foods. Limit sodium intake to 2,400 mg per day or the amount suggested by your healthcare provider.    Limit canned, dried, cured, packaged, and fast foods. These can contain a lot of salt.    Eat 8 to 10 servings of fruits and vegetables every day.    Choose lean meats, fish, or chicken.    Eat whole-grain pasta, brown rice, and beans.    Eat 2 to 3 servings of low-fat or fat-free dairy products.    Ask your doctor about the DASH eating plan. This plan helps reduce blood pressure.    When you go to a restaurant, ask that your meal be prepared with no added salt.  Maintain a healthy weight    Ask your healthcare provider how many calories to eat a day. Then stick to that  number.    Ask your healthcare provider what weight range is healthiest for you. If you are overweight, a weight loss of only 3% to 5% of your body weight can help lower blood pressure. Generally, a good weight loss goal is to lose 10% of your body weight in a year.    Limit snacks and sweets.    Get regular exercise.  Get up and get active    Choose activities you enjoy. Find ones you can do with friends or family. This includes bicycling, dancing, walking, and jogging.    Park farther away from building entrances.    Use stairs instead of the elevator.    When you can, walk or bike instead of driving.    [place] leaves, garden, or do household repairs.    Be active at a moderate to vigorous level of physical activity for at least 40 minutes for a minimum of 3 to 4 days a week.   Manage stress    Make time to relax and enjoy life. Find time to laugh.    Communicate your concerns with your loved ones and your healthcare provider.    Visit with family and friends, and keep up with hobbies.  Limit alcohol and quit smoking    Men should have no more than 2 drinks per day.    Women should have no more than 1 drink per day.    Talk with your healthcare provider about quitting smoking. Smoking significantly increases your risk for heart disease and stroke. Ask your healthcare provider about community smoking cessation programs and other options.  Medicines  If lifestyle changes aren t enough, your healthcare provider may prescribe high blood pressure medicine. Take all medicines as prescribed. If you have any questions about your medicines, ask your healthcare provider before stopping or changing them.   Date Last Reviewed: 4/27/2016 2000-2018 [employer]. [address] [facility], [place] [address]. All rights reserved. This information is not intended as a substitute for professional medical care. Always follow your healthcare professional's instructions.         [facility]     Discharged  by : Feli Dickson CMA    If you have any questions regarding your visit please contact your care team:     Team Radha              Clinic Hours Telephone Number     Dr. Jim Benson, CNP   7am-7pm  Monday - Thursday   7am-5pm  Fridays  [phone]   (Appointment scheduling available 24/7)     RN Line  [phone] option 2     Urgent Care - [facility] [place] [facility] - 11am-9pm Monday-Friday Saturday-Sunday- 9am-5pm     [place] -   5pm-9pm Monday-Friday Saturday-Sunday- 9am-5pm    [phone] - [facility]    [phone] - [place]     For a Price Quote for your services, please call our Consumer Price Line at [phone].     What options do I have for visits at the clinic other than the traditional office visit?     To expand how we care for you, many of our providers are utilizing electronic visits (e-visits) and telephone visits, when medically appropriate, for interactions with their patients rather than a visit in the clinic. We also offer nurse visits for many medical concerns. Just like any other service, we will bill your insurance company for this type of visit based on time spent on the phone with your provider. Not all insurance companies cover these visits. Please check with your medical insurance if this type of visit is covered. You will be responsible for any charges that are not paid by your insurance.     E-visits via [employer]: generally incur a $45.00 fee.     Telephone visits:  Time spent on the phone: *charged based on time that is spent on the phone in increments of 10 minutes. Estimated cost:   5-10 mins $30.00   11-20 mins. $59.00   21-30 mins. $85.00       Use [employer] (secure email communication and access to your chart) to send your primary care provider a message or make an appointment. Ask someone on your Team how to sign up for [employer].     As always, Thank you for trusting us with your health care needs!      Nadine  Radiology and Imaging Services:    Scheduling Appointments  [facility]  Call: [phone]    [facility]  Call: [phone]    [facility]  Call: [phone]    For Gastroenterology referrals   [facility]   Clinics and Surgery Center, 4th Floor   [address] [place] [address]   Appointments: [phone]    WHERE TO GO FOR CARE?    Clinic    Make an appointment if you:       Are sick (cold, cough, flu, sore throat, earache or in pain).       Have a small injury (sprain, small cut, burn or broken bone).       Need a physical exam, Pap smear, vaccine or prescription refill.       Have questions about your health or medicines.    To reach us:      Call [phone] ([phone]). Open 24 hours every day. (For counseling services, call [phone].)    Log into [employer] [facility] [employer]. (Visit [employer] to create an account.) Hospital emergency room    An emergency is a serious or life- threatening problem that must be treated right away.    Call [phone] or get to the hospital if you have:      Very bad or sudden:            - Chest pain or pressure         - Bleeding         - Head or belly pain         - Dizziness or trouble seeing, walking or                          Speaking      Problems breathing      Blood in your vomit or you are coughing up blood      A major injury (knocked out, loss of a finger or limb, rape, broken bone protruding from skin)    A mental health crisis. (Or call the Mental Health Crisis line at 1-138.253.1170 or Suicide Prevention Hotline at 1-194.624.4397.)    Open 24 hours every day. You don't need an appointment.     Urgent care    Visit urgent care for sickness or small injuries when the clinic is closed. You don't need an appointment. To check hours or find an urgent care near you, visit [email or web address]. Online care    Get online care from [facility] for more than 70 common  problems, like colds, allergies and infections. Open 24 hours every day at:   [email or web address]   Need help deciding?    For advice about where to be seen, you may call your clinic and ask to speak with a nurse. We're here for you 24 hours every day.         If you are deaf or hard of hearing, please let us know. We provide many free services including sign language interpreters, oral interpreters, TTYs, telephone amplifiers, note takers and written materials.

## 2019-10-11 NOTE — PROGRESS NOTES
Lung Cancer Screening Shared Decision Making Visit     Theodora Hamilton is not eligible for lung cancer screening on the basis of the information provided in my signed lung cancer screening order. Theodora's smoking history is below the threshold and so it is not recommended.    Patient is not currently a smoker and so we did not discuss that the only way to prevent lung cancer is to not smoke. Smoking cessation assistance was not offered.    ShouldIScreen

## 2019-10-12 LAB
ALBUMIN SERPL-MCNC: 3.8 G/DL (ref 3.4–5)
ALP SERPL-CCNC: 136 U/L (ref 40–150)
ALT SERPL W P-5'-P-CCNC: 28 U/L (ref 0–50)
ANION GAP SERPL CALCULATED.3IONS-SCNC: 6 MMOL/L (ref 3–14)
AST SERPL W P-5'-P-CCNC: 15 U/L (ref 0–45)
BILIRUB SERPL-MCNC: 0.5 MG/DL (ref 0.2–1.3)
BUN SERPL-MCNC: 24 MG/DL (ref 7–30)
CALCIUM SERPL-MCNC: 9 MG/DL (ref 8.5–10.1)
CHLORIDE SERPL-SCNC: 108 MMOL/L (ref 94–109)
CHOLEST SERPL-MCNC: 253 MG/DL
CO2 SERPL-SCNC: 24 MMOL/L (ref 20–32)
CREAT SERPL-MCNC: 0.91 MG/DL (ref 0.52–1.04)
GFR SERPL CREATININE-BSD FRML MDRD: 66 ML/MIN/{1.73_M2}
GLUCOSE SERPL-MCNC: 84 MG/DL (ref 70–99)
HDLC SERPL-MCNC: 57 MG/DL
LDLC SERPL CALC-MCNC: 174 MG/DL
NONHDLC SERPL-MCNC: 196 MG/DL
POTASSIUM SERPL-SCNC: 4.2 MMOL/L (ref 3.4–5.3)
PROT SERPL-MCNC: 7.7 G/DL (ref 6.8–8.8)
SODIUM SERPL-SCNC: 138 MMOL/L (ref 133–144)
TRIGL SERPL-MCNC: 108 MG/DL

## 2019-10-29 ENCOUNTER — MYC MEDICAL ADVICE (OUTPATIENT)
Dept: FAMILY MEDICINE | Facility: CLINIC | Age: 64
End: 2019-10-29

## 2019-10-29 NOTE — TELEPHONE ENCOUNTER
Panel Management Review      Patient has the following on her problem list:     Hypertension   Last three blood pressure readings:  BP Readings from Last 3 Encounters:   10/11/19 (!) 148/80   07/16/19 153/82   07/11/19 173/77     Blood pressure: MONITOR    HTN Guidelines:  Less than 140/90      Composite cancer screening  Chart review shows that this patient is due/due soon for the following Mammogram and Colonoscopy  Summary:    Patient is due/failing the following:   BP CHECK, COLONOSCOPY, MAMMOGRAM and PHYSICAL    Action needed:   Patient needs office visit for Annual Wellness/ BP check.  Looks like patient is already scheduled for her mammogram    Type of outreach:    Sent [employer] message.    Questions for provider review:    None                                                                                                                                   Viola Foy MA       Chart routed to Care Team .

## 2019-11-01 ENCOUNTER — ANCILLARY PROCEDURE (OUTPATIENT)
Dept: MAMMOGRAPHY | Facility: CLINIC | Age: 64
End: 2019-11-01
Attending: FAMILY MEDICINE
Payer: COMMERCIAL

## 2019-11-01 DIAGNOSIS — Z12.31 ENCOUNTER FOR SCREENING MAMMOGRAM FOR BREAST CANCER: ICD-10-CM

## 2019-11-04 ENCOUNTER — HEALTH MAINTENANCE LETTER (OUTPATIENT)
Age: 64
End: 2019-11-04

## 2019-11-06 NOTE — TELEPHONE ENCOUNTER
Panel Management Review  Summary:    Type of outreach:    None- patient has read her [employer]t message    Encounter routed to No Action Needed.                                                                                                                                 Viola Foy MA

## 2019-11-09 ENCOUNTER — TELEPHONE (OUTPATIENT)
Dept: GASTROENTEROLOGY | Facility: CLINIC | Age: 64
End: 2019-11-09

## 2019-11-11 ENCOUNTER — TELEPHONE (OUTPATIENT)
Dept: GASTROENTEROLOGY | Facility: CLINIC | Age: 64
End: 2019-11-11

## 2019-11-11 DIAGNOSIS — Z12.11 SPECIAL SCREENING FOR MALIGNANT NEOPLASMS, COLON: Primary | ICD-10-CM

## 2019-11-11 RX ORDER — BISACODYL 5 MG/1
TABLET, DELAYED RELEASE ORAL
Qty: 4 TABLET | Refills: 0 | Status: SHIPPED | OUTPATIENT
Start: 2019-11-13 | End: 2019-11-14

## 2019-11-11 NOTE — TELEPHONE ENCOUNTER
Patient Name: Theodora Hamilton   : 1955  MRN: [id number]     : [x] N/A   VM with information needed to complete pre-assessment call.  Request pt contact Endoscopy Pre-assessment RN to complete upcoming procedure information.  . Telephone call-back number provided.      Instructions resent via [employer]  - e mail. This includes Request to contact Pre-Assessment RN et information that may be complete by VM if necessary, mychart and e mail instructions, Conscious Sedation policy /  [facility] /[facility] Instructions, procedure date/time/location/provider.      Serene Sheehan, RN,   [facility]    Additional Information regarding appointment:      Patient scheduled for:    [x] Colonoscopy Indication for procedure. [x] Screening      Sedation Type:    [x] MAC       Procedure Provider:  Dr. Jacob Nieves      Referring Provider. Janae North DO    Arrival time verified: 7 am / Friday / 11/15/19    Facility location verified:   [x][facility] [address] [place] [address], 5th floor       Prep Type:   [x]Golytely eRx: ; see below     Anticoagulants or blood thinners: [x]None    Electronic implanted devices: [x] No     Additional Information: pateint left a voicemail and writer called back and left a message.   _______________________________________________     Prema Hamilton sent to Serene Sheehan, RN   Phone Number: [phone]   Via [employer] message          1) do not take any.   2) do not have any   3) Angela Hciks   4) yes, Angela will be with me   5) [facility] on [address]

## 2019-11-13 ENCOUNTER — ANESTHESIA EVENT (OUTPATIENT)
Dept: SURGERY | Facility: AMBULATORY SURGERY CENTER | Age: 64
End: 2019-11-13

## 2019-11-15 ENCOUNTER — HOSPITAL ENCOUNTER (OUTPATIENT)
Facility: AMBULATORY SURGERY CENTER | Age: 64
End: 2019-11-15
Attending: INTERNAL MEDICINE
Payer: COMMERCIAL

## 2019-11-15 ENCOUNTER — ANESTHESIA (OUTPATIENT)
Dept: SURGERY | Facility: AMBULATORY SURGERY CENTER | Age: 64
End: 2019-11-15

## 2019-11-15 VITALS
DIASTOLIC BLOOD PRESSURE: 88 MMHG | HEART RATE: 70 BPM | TEMPERATURE: 97.4 F | SYSTOLIC BLOOD PRESSURE: 136 MMHG | HEIGHT: 61 IN | BODY MASS INDEX: 41.54 KG/M2 | RESPIRATION RATE: 12 BRPM | WEIGHT: 220 LBS | OXYGEN SATURATION: 100 %

## 2019-11-15 VITALS — HEART RATE: 69 BPM

## 2019-11-15 PROBLEM — M65.949 FLEXOR TENOSYNOVITIS OF FINGER: Status: RESOLVED | Noted: 2019-07-09 | Resolved: 2019-11-15

## 2019-11-15 PROBLEM — M25.642 STIFFNESS OF FINGER JOINT, LEFT: Status: RESOLVED | Noted: 2019-07-19 | Resolved: 2019-11-15

## 2019-11-15 LAB — COLONOSCOPY: NORMAL

## 2019-11-15 RX ORDER — ONDANSETRON 2 MG/ML
4 INJECTION INTRAMUSCULAR; INTRAVENOUS EVERY 6 HOURS PRN
Status: DISCONTINUED | OUTPATIENT
Start: 2019-11-15 | End: 2019-11-16 | Stop reason: HOSPADM

## 2019-11-15 RX ORDER — OXYCODONE HYDROCHLORIDE 5 MG/1
5 TABLET ORAL EVERY 4 HOURS PRN
Status: DISCONTINUED | OUTPATIENT
Start: 2019-11-15 | End: 2019-11-16 | Stop reason: HOSPADM

## 2019-11-15 RX ORDER — NALOXONE HYDROCHLORIDE 0.4 MG/ML
.1-.4 INJECTION, SOLUTION INTRAMUSCULAR; INTRAVENOUS; SUBCUTANEOUS
Status: DISCONTINUED | OUTPATIENT
Start: 2019-11-15 | End: 2019-11-16 | Stop reason: HOSPADM

## 2019-11-15 RX ORDER — LIDOCAINE 40 MG/G
CREAM TOPICAL
Status: DISCONTINUED | OUTPATIENT
Start: 2019-11-15 | End: 2019-11-15 | Stop reason: HOSPADM

## 2019-11-15 RX ORDER — SODIUM CHLORIDE, SODIUM LACTATE, POTASSIUM CHLORIDE, CALCIUM CHLORIDE 600; 310; 30; 20 MG/100ML; MG/100ML; MG/100ML; MG/100ML
INJECTION, SOLUTION INTRAVENOUS CONTINUOUS
Status: DISCONTINUED | OUTPATIENT
Start: 2019-11-15 | End: 2019-11-16 | Stop reason: HOSPADM

## 2019-11-15 RX ORDER — FLUMAZENIL 0.1 MG/ML
0.2 INJECTION, SOLUTION INTRAVENOUS
Status: ACTIVE | OUTPATIENT
Start: 2019-11-15 | End: 2019-11-15

## 2019-11-15 RX ORDER — ONDANSETRON 4 MG/1
4 TABLET, ORALLY DISINTEGRATING ORAL EVERY 6 HOURS PRN
Status: DISCONTINUED | OUTPATIENT
Start: 2019-11-15 | End: 2019-11-16 | Stop reason: HOSPADM

## 2019-11-15 RX ORDER — PROPOFOL 10 MG/ML
INJECTION, EMULSION INTRAVENOUS PRN
Status: DISCONTINUED | OUTPATIENT
Start: 2019-11-15 | End: 2019-11-15

## 2019-11-15 RX ORDER — FENTANYL CITRATE 50 UG/ML
25-50 INJECTION, SOLUTION INTRAMUSCULAR; INTRAVENOUS
Status: DISCONTINUED | OUTPATIENT
Start: 2019-11-15 | End: 2019-11-15 | Stop reason: HOSPADM

## 2019-11-15 RX ORDER — MEPERIDINE HYDROCHLORIDE 25 MG/ML
12.5 INJECTION INTRAMUSCULAR; INTRAVENOUS; SUBCUTANEOUS
Status: DISCONTINUED | OUTPATIENT
Start: 2019-11-15 | End: 2019-11-16 | Stop reason: HOSPADM

## 2019-11-15 RX ORDER — ACETAMINOPHEN 325 MG/1
975 TABLET ORAL ONCE
Status: DISCONTINUED | OUTPATIENT
Start: 2019-11-15 | End: 2019-11-15 | Stop reason: HOSPADM

## 2019-11-15 RX ORDER — ONDANSETRON 2 MG/ML
4 INJECTION INTRAMUSCULAR; INTRAVENOUS EVERY 30 MIN PRN
Status: DISCONTINUED | OUTPATIENT
Start: 2019-11-15 | End: 2019-11-16 | Stop reason: HOSPADM

## 2019-11-15 RX ORDER — SODIUM CHLORIDE, SODIUM LACTATE, POTASSIUM CHLORIDE, CALCIUM CHLORIDE 600; 310; 30; 20 MG/100ML; MG/100ML; MG/100ML; MG/100ML
INJECTION, SOLUTION INTRAVENOUS CONTINUOUS
Status: DISCONTINUED | OUTPATIENT
Start: 2019-11-15 | End: 2019-11-15 | Stop reason: HOSPADM

## 2019-11-15 RX ORDER — ONDANSETRON 2 MG/ML
4 INJECTION INTRAMUSCULAR; INTRAVENOUS
Status: DISCONTINUED | OUTPATIENT
Start: 2019-11-15 | End: 2019-11-15 | Stop reason: HOSPADM

## 2019-11-15 RX ORDER — ONDANSETRON 4 MG/1
4 TABLET, ORALLY DISINTEGRATING ORAL EVERY 30 MIN PRN
Status: DISCONTINUED | OUTPATIENT
Start: 2019-11-15 | End: 2019-11-16 | Stop reason: HOSPADM

## 2019-11-15 RX ADMIN — PROPOFOL 30 MG: 10 INJECTION, EMULSION INTRAVENOUS at 08:18

## 2019-11-15 RX ADMIN — PROPOFOL 20 MG: 10 INJECTION, EMULSION INTRAVENOUS at 08:30

## 2019-11-15 RX ADMIN — PROPOFOL 20 MG: 10 INJECTION, EMULSION INTRAVENOUS at 08:34

## 2019-11-15 RX ADMIN — PROPOFOL 20 MG: 10 INJECTION, EMULSION INTRAVENOUS at 08:23

## 2019-11-15 RX ADMIN — PROPOFOL 20 MG: 10 INJECTION, EMULSION INTRAVENOUS at 08:21

## 2019-11-15 RX ADMIN — PROPOFOL 10 MG: 10 INJECTION, EMULSION INTRAVENOUS at 08:26

## 2019-11-15 RX ADMIN — SODIUM CHLORIDE, SODIUM LACTATE, POTASSIUM CHLORIDE, CALCIUM CHLORIDE: 600; 310; 30; 20 INJECTION, SOLUTION INTRAVENOUS at 08:09

## 2019-11-15 ASSESSMENT — MIFFLIN-ST. JEOR: SCORE: 1480.29

## 2019-11-15 NOTE — PRE-PROCEDURE
Theodora Hamilton  [id number]  female  64 year old      Reason for procedure/surgery: screening    Patient Active Problem List   Diagnosis     Asymptomatic postmenopausal status     OA (Osteoarthritis) - left great toe     HYPERLIPIDEMIA LDL GOAL <160     Obesity     Flexor tenosynovitis of finger     Stiffness of finger joint, left     Morbid obesity (H)       Past Surgical History:    Past Surgical History:   Procedure Laterality Date     COLONOSCOPY       INCISION AND DRAINAGE FINGER, COMBINED Left 7/9/2019    Procedure: Incision and Drainage of Left Index Finger;  Surgeon: Lowell Whelan MD;  Location:  OR     NO HISTORY OF SURGERY         Past Medical History:   Past Medical History:   Diagnosis Date     Arthritis      Asymptomatic postmenopausal status (age-related) (natural)        Social History:   Social History     Tobacco Use     Smoking status: Former Smoker     Packs/day: 0.00     Years: 0.00     Pack years: 0.00     Types: Cigarettes     Smokeless tobacco: Never Used   Substance Use Topics     Alcohol use: Yes     Comment: 5 times per year       Family History:   Family History   Problem Relation Age of Onset     C.A.D. Maternal Grandmother      C.A.D. Maternal Grandfather         17 MI's per history     C.AYURY Paternal Grandmother      Diabetes Paternal Grandmother      C.AROSALINDA. Paternal Grandfather      Diabetes Paternal Grandfather      Hypertension Mother      C.A.D. Mother         pacemaker     Circulatory Father         Carotid artery stenosis     Respiratory Father 75        COPD     Coronary Artery Disease Father      Diabetes Paternal Aunt      Diabetes Paternal Uncle      Coronary Artery Disease Brother      Asthma No family hx of      Cerebrovascular Disease No family hx of      Breast Cancer No family hx of      Cancer - colorectal No family hx of      Prostate Cancer No family hx of        Allergies:   Allergies   Allergen Reactions     Amoxicillin Hives       Active Medications:  "  Current Outpatient Medications   Medication Sig Dispense Refill     alum & mag hydroxide-simethicone (MYLANTA/MAALOX) 200-200-20 MG/5ML SUSP suspension Take 30 mLs by mouth once as needed for indigestion or heartburn (uses 2-3 times per week)       diphenhydrAMINE (BENADRYL) 25 MG tablet Take 25-50 mg by mouth daily as needed for allergies       lisinopril (PRINIVIL/ZESTRIL) 20 MG tablet Take 1 tablet (20 mg) by mouth daily 90 tablet 3     omeprazole 20 MG tablet Take 1 tablet (20 mg) by mouth daily Take 30-60 minutes before a meal. 30 tablet 1     amLODIPine (NORVASC) 5 MG tablet Take 1 tablet (5 mg) by mouth daily (Patient not taking: Reported on 10/11/2019) 30 tablet 0     polyethylene glycol (GOLYTELY/NULYTELY) 236 g suspension Follow Split-dose Golytely Colonoscopy Prep Instructions - finish all prep - even if you experience \"clear\" stools at any time. 4000 mL 1       Systemic Review:   CONSTITUTIONAL: NEGATIVE for fever, chills, change in weight  ENT/MOUTH: NEGATIVE for ear, mouth and throat problems  RESP: NEGATIVE for significant cough or SOB  CV: NEGATIVE for chest pain, palpitations or peripheral edema    Physical Examination:   Vital Signs: BP (P) 129/75   Pulse 70   Temp (P) 97.3  F (36.3  C) (Temporal)   Resp (P) 12   Ht 1.549 m (5' 1\")   Wt 99.8 kg (220 lb)   LMP  (LMP Unknown)   SpO2 98%   BMI 41.57 kg/m    GENERAL: healthy, alert and no distress  NECK: no adenopathy, no asymmetry, masses, or scars  RESP: lungs clear to auscultation - no rales, rhonchi or wheezes  CV: regular rate and rhythm, normal S1 S2, no S3 or S4, no murmur, click or rub, no peripheral edema and peripheral pulses strong  ABDOMEN: soft, nontender, no hepatosplenomegaly, no masses and bowel sounds normal  MS: no gross musculoskeletal defects noted, no edema    Plan: Appropriate to proceed as scheduled.      Jacob Nieves MD  11/15/2019    PCP:  Janae North    "

## 2019-11-15 NOTE — ANESTHESIA CARE TRANSFER NOTE
Patient: Theodora Hamilton    Procedure(s):  COLONOSCOPY, WITH POLYPECTOMY AND BIOPSY    Diagnosis: Special screening for malignant neoplasms, colon [Z12.11]  Diagnosis Additional Information: No value filed.    Anesthesia Type:   MAC     Note:  Airway :Room Air  Patient transferred to:Phase II  Handoff Report: Identifed the Patient, Identified the Reponsible Provider, Reviewed the pertinent medical history, Discussed the surgical course, Reviewed Intra-OP anesthesia mangement and issues during anesthesia, Set expectations for post-procedure period and Allowed opportunity for questions and acknowledgement of understanding      Vitals: (Last set prior to Anesthesia Care Transfer)    CRNA VITALS  11/15/2019 0810 - 11/15/2019 0845      11/15/2019             Pulse:  70    Ht Rate:  70    SpO2:  98 %                Electronically Signed By: BETINA Evangelista CRNA  November 15, 2019  8:45 AM

## 2019-11-15 NOTE — ANESTHESIA POSTPROCEDURE EVALUATION
Anesthesia POST Procedure Evaluation    Patient: Theodora Hamilton   MRN:     [id number] Gender:   female   Age:    64 year old :      1955        Preoperative Diagnosis: Special screening for malignant neoplasms, colon [Z12.11]   Procedure(s):  COLONOSCOPY, WITH POLYPECTOMY AND BIOPSY   Postop Comments: No value filed.       Anesthesia Type:  Not documented  MAC    Reportable Event: NO     PAIN: Uncomplicated   Sign Out status: Comfortable, Well controlled pain     PONV: No PONV   Sign Out status:  No Nausea or Vomiting     Neuro/Psych: Uneventful perioperative course   Sign Out Status: Preoperative baseline; Age appropriate mentation     Airway/Resp.: Uneventful perioperative course   Sign Out Status: Non labored breathing, age appropriate RR; Resp. Status within EXPECTED Parameters     CV: Uneventful perioperative course   Sign Out status: Appropriate BP and perfusion indices; Appropriate HR/Rhythm     Disposition:   Sign Out in:  Phase II  Disposition:  Home  Recovery Course: Uneventful  Follow-Up: Not required           Last Anesthesia Record Vitals:  CRNA VITALS  11/15/2019 0810 - 11/15/2019 0910      11/15/2019             Pulse:  70    Ht Rate:  70    SpO2:  98 %          Last PACU Vitals:  Vitals Value Taken Time   BP     Temp     Pulse 69 11/15/2019  8:35 AM   Resp     SpO2     Temp src     NIBP 128/68 11/15/2019  8:35 AM   Pulse 70 11/15/2019  8:38 AM   SpO2 98 % 11/15/2019  8:38 AM   Resp     Temp     Ht Rate 70 11/15/2019  8:38 AM   Temp 2           Electronically Signed By: Serafin Vicente MD, November 15, 2019, 1:38 PM

## 2019-11-15 NOTE — ANESTHESIA PREPROCEDURE EVALUATION
Anesthesia Pre-Procedure Evaluation    Patient: Theodora Hamilton   MRN:     [id number] Gender:   female   Age:    64 year old :      1955        Preoperative Diagnosis: Left Index Finger Flexor Tenosynovitis   Procedure(s):  Incision and Drainage of Left Index Finger     Past Medical History:   Diagnosis Date     Arthritis      Asymptomatic postmenopausal status (age-related) (natural)       Past Surgical History:   Procedure Laterality Date     COLONOSCOPY       INCISION AND DRAINAGE FINGER, COMBINED Left 2019    Procedure: Incision and Drainage of Left Index Finger;  Surgeon: Lowell Whelan MD;  Location:  OR     NO HISTORY OF SURGERY                       PHYSICAL EXAM:   Mental Status/Neuro:    Airway:   Mallampati: II  Mouth/Opening: Full  TM distance: > 6 cm  Neck ROM: Full   Respiratory:   Resp. Rate: Normal     Resp. Effort: Normal      CV:    Comments:                      Lab Results   Component Value Date    WBC 7.8 2019    HGB 15.1 2019    HCT 47.9 (H) 2019     2019    CRP <2.9 2019    SED 19 2019     10/11/2019    POTASSIUM 4.2 10/11/2019    CHLORIDE 108 10/11/2019    CO2 24 10/11/2019    BUN 24 10/11/2019    CR 0.91 10/11/2019    GLC 84 10/11/2019    BRETT 9.0 10/11/2019    ALBUMIN 3.8 10/11/2019    PROTTOTAL 7.7 10/11/2019    ALT 28 10/11/2019    AST 15 10/11/2019    ALKPHOS 136 10/11/2019    BILITOTAL 0.5 10/11/2019    LIPASE 175 2014    TSH 1.73 2006       Preop Vitals  BP Readings from Last 3 Encounters:   11/15/19 (!) 142/84   10/11/19 (!) 148/80   19 153/82    Pulse Readings from Last 3 Encounters:   11/15/19 70   10/11/19 64   19 63      Resp Readings from Last 3 Encounters:   11/15/19 16   19 16   17 18    SpO2 Readings from Last 3 Encounters:   11/15/19 98%   10/11/19 99%   19 99%      Temp Readings from Last 1 Encounters:   11/15/19 36.8  C (98.2  F) (Oral)    Ht Readings from Last 1  "Encounters:   11/15/19 1.549 m (5' 1\")      Wt Readings from Last 1 Encounters:   11/15/19 99.8 kg (220 lb)    Estimated body mass index is 41.57 kg/m  as calculated from the following:    Height as of an earlier encounter on 11/15/19: 1.549 m (5' 1\").    Weight as of an earlier encounter on 11/15/19: 99.8 kg (220 lb).     LDA:            Assessment: Elective due to   ASA SCORE: 2    H&P: History and physical reviewed and following examination; no interval change.        - Preop Testing complete  Smoking Status:  NO Active use of Tobacco/UNKNOWN Tobacco use status   NPO Status: > 6 hours since completed Solid Foods     Plan:   Anes. Type:  MAC   Pre-Medication: None   Induction:  N/a   Airway: Native Airway   Access/Monitoring: PIV   Maintenance: Propofol Sedation     Postop Plan:   Postop Pain: None  Postop Sedation/Airway: Not planned  Disposition: Outpatient     PONV Management:   Adult Risk Factors: Female, Non-Smoker   Prevention:, Propofol     CONSENT: Direct conversation   Plan and risks discussed with: Patient   Blood Products: Consent Deferred (Minimal Blood Loss)                             Serafin Vicente MD  "

## 2019-11-18 LAB — COPATH REPORT: NORMAL

## 2020-02-10 ENCOUNTER — HEALTH MAINTENANCE LETTER (OUTPATIENT)
Age: 65
End: 2020-02-10

## 2020-11-13 ENCOUNTER — OFFICE VISIT (OUTPATIENT)
Dept: FAMILY MEDICINE | Facility: CLINIC | Age: 65
End: 2020-11-13
Payer: COMMERCIAL

## 2020-11-13 VITALS
TEMPERATURE: 98.3 F | DIASTOLIC BLOOD PRESSURE: 70 MMHG | WEIGHT: 234 LBS | SYSTOLIC BLOOD PRESSURE: 142 MMHG | HEART RATE: 70 BPM | BODY MASS INDEX: 45.94 KG/M2 | HEIGHT: 60 IN

## 2020-11-13 DIAGNOSIS — I10 ESSENTIAL HYPERTENSION: Primary | ICD-10-CM

## 2020-11-13 DIAGNOSIS — L98.9 SKIN LESION: ICD-10-CM

## 2020-11-13 DIAGNOSIS — Z23 NEED FOR PROPHYLACTIC VACCINATION AND INOCULATION AGAINST INFLUENZA: ICD-10-CM

## 2020-11-13 DIAGNOSIS — E78.5 HYPERLIPIDEMIA LDL GOAL <160: ICD-10-CM

## 2020-11-13 DIAGNOSIS — E66.01 MORBID OBESITY (H): ICD-10-CM

## 2020-11-13 DIAGNOSIS — Z12.31 ENCOUNTER FOR SCREENING MAMMOGRAM FOR BREAST CANCER: ICD-10-CM

## 2020-11-13 DIAGNOSIS — M79.674 PAIN OF TOE OF RIGHT FOOT: ICD-10-CM

## 2020-11-13 DIAGNOSIS — R07.89 ATYPICAL CHEST PAIN: ICD-10-CM

## 2020-11-13 PROCEDURE — G0008 ADMIN INFLUENZA VIRUS VAC: HCPCS | Performed by: FAMILY MEDICINE

## 2020-11-13 PROCEDURE — 99214 OFFICE O/P EST MOD 30 MIN: CPT | Mod: 25 | Performed by: FAMILY MEDICINE

## 2020-11-13 PROCEDURE — 36415 COLL VENOUS BLD VENIPUNCTURE: CPT | Performed by: FAMILY MEDICINE

## 2020-11-13 PROCEDURE — 90662 IIV NO PRSV INCREASED AG IM: CPT | Performed by: FAMILY MEDICINE

## 2020-11-13 PROCEDURE — 90732 PPSV23 VACC 2 YRS+ SUBQ/IM: CPT | Performed by: FAMILY MEDICINE

## 2020-11-13 PROCEDURE — G0009 ADMIN PNEUMOCOCCAL VACCINE: HCPCS | Performed by: FAMILY MEDICINE

## 2020-11-13 PROCEDURE — 80061 LIPID PANEL: CPT | Performed by: FAMILY MEDICINE

## 2020-11-13 PROCEDURE — 80048 BASIC METABOLIC PNL TOTAL CA: CPT | Performed by: FAMILY MEDICINE

## 2020-11-13 RX ORDER — LISINOPRIL 20 MG/1
20 TABLET ORAL DAILY
Qty: 90 TABLET | Refills: 3 | Status: SHIPPED | OUTPATIENT
Start: 2020-11-13 | End: 2021-11-24

## 2020-11-13 ASSESSMENT — MIFFLIN-ST. JEOR: SCORE: 1521.98

## 2020-11-13 NOTE — PROGRESS NOTES
"Subjective     Theodora Hamilton is a 65 year old female who presents to clinic today for the following health issues:    HPI         Hypertension Follow-up      Do you check your blood pressure regularly outside of the clinic? Yes     Are you following a low salt diet? No    Are your blood pressures ever more than 140 on the top number (systolic) OR more   than 90 on the bottom number (diastolic), for example 140/90? No      How many servings of fruits and vegetables do you eat daily?  0-1    On average, how many sweetened beverages do you drink each day (Examples: soda, juice, sweet tea, etc.  Do NOT count diet or artificially sweetened beverages)?   1 cup of tea with honey and 1 cup of capuccino    How many days per week do you exercise enough to make your heart beat faster? 0    How many minutes a day do you exercise enough to make your heart beat faster? 0    How many days per week do you miss taking your medication? 0    Patient notes intermittent chest pains in multiple areas of her chest lately.  Describes \"pinprick\" pain that lasts a couple minutes and gets better when she burps.  Denies SOB.  She does note some palpitations at bedtime, but not during the day.  Denies CP with exertion.      Has a sore on her lip that keeps reappearing for the past year.  Gets scaly.  Blistex helps.      Her right 4th toe has been hurting her.  Her nail turned black, but now it's back to normal.  She doesn't remember any injuries.  It only hurts when she puts pressure on it.  Doesn't hurt her when she walks.  No swelling.  Never had any discoloration of the toe itself, just the nail.       Review of Systems   Constitutional, HEENT, cardiovascular, pulmonary, GI, , musculoskeletal, neuro, skin, endocrine and psych systems are negative, except as otherwise noted.      Objective    /74 (BP Location: Right arm, Patient Position: Chair, Cuff Size: Adult Large)   Pulse 70   Temp 98.3  F (36.8  C) (Oral)   Ht 1.522 m (4' " "11.94\")   Wt 106.1 kg (234 lb)   LMP  (LMP Unknown)   BMI 45.79 kg/m    Body mass index is 45.79 kg/m .  Physical Exam   GENERAL: healthy, alert and no distress  NECK: no carotid bruits  RESP: lungs clear to auscultation - no rales, rhonchi or wheezes  CV: regular rates and rhythm, normal S1 S2, no S3 or S4, no murmur, click or rub, peripheral pulses strong and no peripheral edema  MS: no gross musculoskeletal defects noted, no edema  SKIN: Small area of dry skin with underlying scar tissue just above her upper lip measuring approx 2-3mm           Assessment & Plan     Essential hypertension  - Well controlled   - Continue lisinopril at current dose   - lisinopril (ZESTRIL) 20 MG tablet; Take 1 tablet (20 mg) by mouth daily  - Basic metabolic panel  (Ca, Cl, CO2, Creat, Gluc, K, Na, BUN)    Atypical chest pain  - Does not sound cardiac in origin  - Most likely stress related  - Advised further workup if she ever notes exertional chest pain or longer lasting concha     Skin lesion  - Skin lesion on her upper lip is most likely an SK or AK  - She has a dermatologist and will have them look closer at it to determine if it needs to be removed     Pain of toe of right foot  - Reassured her   - Her right 4th toe looks completely normal.  She most likely just injured the nail causing some discoloration.  Completely resolved now.     Hyperlipidemia LDL goal <160  - Lipid panel reflex to direct LDL Fasting    Morbid obesity (H)    Need for prophylactic vaccination and inoculation against influenza  - FLUZONE HIGH DOSE 65+  [98401]    Encounter for screening mammogram for breast cancer  - MA Screen Bilateral w/Gerry; Future     BMI:   Estimated body mass index is 45.79 kg/m  as calculated from the following:    Height as of this encounter: 1.522 m (4' 11.94\").    Weight as of this encounter: 106.1 kg (234 lb).   Weight management plan: Discussed healthy diet and exercise guidelines         Return in about 6 months (around " 5/13/2021) for BP Recheck.    Janae North [facility]

## 2020-11-13 NOTE — NURSING NOTE
Patient instructed to remain in clinic for 15 minutes afterwards, and to report any adverse reaction to me immediately.    Prior to injection verified patient identity using patient's name and date of birth.  Vaccine information supplied for influenza and pneumococcal 23.  Tahmina See LADI Dupree

## 2020-11-14 LAB
ANION GAP SERPL CALCULATED.3IONS-SCNC: 2 MMOL/L (ref 3–14)
BUN SERPL-MCNC: 24 MG/DL (ref 7–30)
CALCIUM SERPL-MCNC: 9.2 MG/DL (ref 8.5–10.1)
CHLORIDE SERPL-SCNC: 110 MMOL/L (ref 94–109)
CHOLEST SERPL-MCNC: 266 MG/DL
CO2 SERPL-SCNC: 25 MMOL/L (ref 20–32)
CREAT SERPL-MCNC: 1.08 MG/DL (ref 0.52–1.04)
GFR SERPL CREATININE-BSD FRML MDRD: 53 ML/MIN/{1.73_M2}
GLUCOSE SERPL-MCNC: 80 MG/DL (ref 70–99)
HDLC SERPL-MCNC: 61 MG/DL
LDLC SERPL CALC-MCNC: 182 MG/DL
NONHDLC SERPL-MCNC: 205 MG/DL
POTASSIUM SERPL-SCNC: 4.4 MMOL/L (ref 3.4–5.3)
SODIUM SERPL-SCNC: 137 MMOL/L (ref 133–144)
TRIGL SERPL-MCNC: 115 MG/DL

## 2020-11-27 DIAGNOSIS — R79.89 ELEVATED SERUM CREATININE: ICD-10-CM

## 2020-11-27 LAB
ANION GAP SERPL CALCULATED.3IONS-SCNC: 6 MMOL/L (ref 3–14)
BUN SERPL-MCNC: 22 MG/DL (ref 7–30)
CALCIUM SERPL-MCNC: 8.6 MG/DL (ref 8.5–10.1)
CHLORIDE SERPL-SCNC: 109 MMOL/L (ref 94–109)
CO2 SERPL-SCNC: 24 MMOL/L (ref 20–32)
CREAT SERPL-MCNC: 1.01 MG/DL (ref 0.52–1.04)
GFR SERPL CREATININE-BSD FRML MDRD: 58 ML/MIN/{1.73_M2}
GLUCOSE SERPL-MCNC: 89 MG/DL (ref 70–99)
POTASSIUM SERPL-SCNC: 4.1 MMOL/L (ref 3.4–5.3)
SODIUM SERPL-SCNC: 139 MMOL/L (ref 133–144)

## 2020-11-27 PROCEDURE — 36415 COLL VENOUS BLD VENIPUNCTURE: CPT | Performed by: FAMILY MEDICINE

## 2020-11-27 PROCEDURE — 80048 BASIC METABOLIC PNL TOTAL CA: CPT | Performed by: FAMILY MEDICINE

## 2021-01-11 ENCOUNTER — ANCILLARY PROCEDURE (OUTPATIENT)
Dept: MAMMOGRAPHY | Facility: CLINIC | Age: 66
End: 2021-01-11
Payer: COMMERCIAL

## 2021-01-11 DIAGNOSIS — Z12.31 ENCOUNTER FOR SCREENING MAMMOGRAM FOR BREAST CANCER: ICD-10-CM

## 2021-01-11 PROCEDURE — 77067 SCR MAMMO BI INCL CAD: CPT | Mod: GC | Performed by: RADIOLOGY

## 2021-01-11 PROCEDURE — 77063 BREAST TOMOSYNTHESIS BI: CPT | Mod: GC | Performed by: RADIOLOGY

## 2021-01-12 ENCOUNTER — MYC MEDICAL ADVICE (OUTPATIENT)
Dept: FAMILY MEDICINE | Facility: CLINIC | Age: 66
End: 2021-01-12

## 2021-01-12 DIAGNOSIS — E78.5 HYPERLIPIDEMIA LDL GOAL <160: Primary | ICD-10-CM

## 2021-01-13 NOTE — TELEPHONE ENCOUNTER
Please see MyChart message re: pravastatin potential side effects    Routed to PCP to review and advise - need virtual visit to discuss further?     Mamta Hutchins, RN, BSN, PHN  [facility]: [place]

## 2021-01-14 RX ORDER — ATORVASTATIN CALCIUM 10 MG/1
10 TABLET, FILM COATED ORAL DAILY
Qty: 90 TABLET | Refills: 3 | Status: SHIPPED | OUTPATIENT
Start: 2021-01-14 | End: 2022-02-25

## 2021-03-06 ENCOUNTER — IMMUNIZATION (OUTPATIENT)
Dept: NURSING | Facility: CLINIC | Age: 66
End: 2021-03-06
Payer: COMMERCIAL

## 2021-03-06 PROCEDURE — 0031A PR COVID VAC JANSSEN AD26 0.5ML: CPT

## 2021-03-06 PROCEDURE — 91303 PR COVID VAC JANSSEN AD26 0.5ML: CPT

## 2021-04-04 ENCOUNTER — HEALTH MAINTENANCE LETTER (OUTPATIENT)
Age: 66
End: 2021-04-04

## 2021-09-18 ENCOUNTER — HEALTH MAINTENANCE LETTER (OUTPATIENT)
Age: 66
End: 2021-09-18

## 2021-11-22 DIAGNOSIS — I10 ESSENTIAL HYPERTENSION: ICD-10-CM

## 2021-11-24 RX ORDER — LISINOPRIL 20 MG/1
TABLET ORAL
Qty: 30 TABLET | Refills: 0 | Status: SHIPPED | OUTPATIENT
Start: 2021-11-24 | End: 2021-11-29

## 2021-11-24 NOTE — TELEPHONE ENCOUNTER
BP Readings from Last 3 Encounters:   11/13/20 (!) 142/70   11/15/19 136/88   10/11/19 (!) 148/80       Mamta Hutchins, RN, BSN, PHN  [facility]: [place]

## 2021-11-29 ENCOUNTER — VIRTUAL VISIT (OUTPATIENT)
Dept: FAMILY MEDICINE | Facility: CLINIC | Age: 66
End: 2021-11-29
Payer: COMMERCIAL

## 2021-11-29 DIAGNOSIS — R43.0 COVID-19 LONG HAULER MANIFESTING CHRONIC LOSS OF SMELL: ICD-10-CM

## 2021-11-29 DIAGNOSIS — I10 ESSENTIAL HYPERTENSION: Primary | ICD-10-CM

## 2021-11-29 DIAGNOSIS — E66.01 MORBID OBESITY (H): ICD-10-CM

## 2021-11-29 DIAGNOSIS — E78.5 HYPERLIPIDEMIA LDL GOAL <160: ICD-10-CM

## 2021-11-29 DIAGNOSIS — U09.9 COVID-19 LONG HAULER MANIFESTING CHRONIC LOSS OF SMELL: ICD-10-CM

## 2021-11-29 PROCEDURE — 99214 OFFICE O/P EST MOD 30 MIN: CPT | Mod: 95 | Performed by: FAMILY MEDICINE

## 2021-11-29 RX ORDER — LISINOPRIL 30 MG/1
30 TABLET ORAL DAILY
Qty: 90 TABLET | Refills: 1 | Status: SHIPPED | OUTPATIENT
Start: 2021-11-29 | End: 2022-05-09

## 2021-11-29 NOTE — PROGRESS NOTES
Prema is a 66 year old who is being evaluated via a billable video visit.      How would you like to obtain your AVS? MyChart  If the video visit is dropped, the invitation should be resent by: Text to cell phone: [phone]  Will anyone else be joining your video visit? 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    Janae Novak, [facility]    =================================================================================================    Subjective   Prema is a 66 year old who presents for the following health issues:    History of Present Illness       Hypertension: She presents for follow up of hypertension.  She does check blood pressure  regularly outside of the clinic. Outpatient blood pressures have not been over 140/90. She does not follow a low salt diet.     She eats 0-1 servings of fruits and vegetables daily.She consumes 0 sweetened beverage(s) daily.She exercises with enough effort to increase her heart rate 10 to 19 minutes per day.  She exercises with enough effort to increase her heart rate 3 or less days per week.   She is taking medications regularly.     Patient had COVID approximately one year ago.  Still having issues with taste and smell, which is frustrating.    Review of Systems    Constitutional, HEENT, cardiovascular, pulmonary, gi and gu systems are negative, except as otherwise noted.      Objective           Vitals:  No vitals were obtained today due to virtual visit.    Physical Exam   GENERAL: Healthy, alert and no distress  EYES: Eyes grossly normal to inspection.  No discharge or erythema, or obvious scleral/conjunctival abnormalities.  RESP: No audible wheeze, cough, or visible cyanosis.  No visible retractions or increased work of breathing.    SKIN: Visible skin clear. No significant rash, abnormal pigmentation or lesions.  NEURO: Cranial nerves grossly intact.  Mentation and speech appropriate for age.  PSYCH: Mentation appears normal, affect normal/bright, judgement and insight intact, normal speech and appearance well-groomed.          Video-Visit Details    Type of service:  Video Visit    Video Start Time: 3:00 PM  Video End Time: 3:13 PM    Originating Location (pt. Location): Home    Distant Location (provider location):  [facility]     Platform used for Video Visit: [employer]

## 2021-12-06 ENCOUNTER — LAB (OUTPATIENT)
Dept: LAB | Facility: CLINIC | Age: 66
End: 2021-12-06
Payer: COMMERCIAL

## 2021-12-06 DIAGNOSIS — I10 ESSENTIAL HYPERTENSION: ICD-10-CM

## 2021-12-06 DIAGNOSIS — E78.5 HYPERLIPIDEMIA LDL GOAL <160: ICD-10-CM

## 2021-12-06 PROCEDURE — 80061 LIPID PANEL: CPT

## 2021-12-06 PROCEDURE — 80048 BASIC METABOLIC PNL TOTAL CA: CPT

## 2021-12-06 PROCEDURE — 36415 COLL VENOUS BLD VENIPUNCTURE: CPT

## 2021-12-08 LAB
ANION GAP SERPL CALCULATED.3IONS-SCNC: 5 MMOL/L (ref 3–14)
BUN SERPL-MCNC: 25 MG/DL (ref 7–30)
CALCIUM SERPL-MCNC: 8.5 MG/DL (ref 8.5–10.1)
CHLORIDE BLD-SCNC: 111 MMOL/L (ref 94–109)
CO2 SERPL-SCNC: 24 MMOL/L (ref 20–32)
CREAT SERPL-MCNC: 0.98 MG/DL (ref 0.52–1.04)
GFR SERPL CREATININE-BSD FRML MDRD: 60 ML/MIN/1.73M2
GLUCOSE BLD-MCNC: 89 MG/DL (ref 70–99)
POTASSIUM BLD-SCNC: 4.8 MMOL/L (ref 3.4–5.3)
SODIUM SERPL-SCNC: 140 MMOL/L (ref 133–144)

## 2021-12-09 NOTE — RESULT ENCOUNTER NOTE
Dear Prema,     Here are your recent results. Your metabolic panel is looking good.  For some reason it's been taking a really long time to get lipid results back recently so stay tuned for those results.      Please let us know if you have any questions or concerns.    Regards,  Janae Novak, DO

## 2021-12-15 LAB
CHOLEST SERPL-MCNC: 201 MG/DL
FASTING STATUS PATIENT QL REPORTED: YES
HDLC SERPL-MCNC: 71 MG/DL
LDLC SERPL CALC-MCNC: 113 MG/DL
NONHDLC SERPL-MCNC: 130 MG/DL
TRIGL SERPL-MCNC: 85 MG/DL

## 2021-12-15 NOTE — RESULT ENCOUNTER NOTE
Dear Prema,     Finally got the cholesterol results back!  Sorry for the wait on those.  Cholesterol is looking much better compared to last year.  You should continue the atorvastatin at the current dose.      Please let us know if you have any questions or concerns.    Regards,  Janae Novak, DO

## 2022-04-30 ENCOUNTER — HEALTH MAINTENANCE LETTER (OUTPATIENT)
Age: 67
End: 2022-04-30

## 2022-05-04 ASSESSMENT — ENCOUNTER SYMPTOMS
DIARRHEA: 0
COUGH: 0
DYSURIA: 0
NAUSEA: 0
FEVER: 0
HEMATOCHEZIA: 0
WEAKNESS: 0
ABDOMINAL PAIN: 0
HEMATURIA: 0
PALPITATIONS: 0
SORE THROAT: 0
BREAST MASS: 0
EYE PAIN: 0
HEARTBURN: 1
HEADACHES: 0
CONSTIPATION: 0
SHORTNESS OF BREATH: 0
NERVOUS/ANXIOUS: 0
ARTHRALGIAS: 0
DIZZINESS: 0
CHILLS: 0
MYALGIAS: 0
PARESTHESIAS: 0
JOINT SWELLING: 0
FREQUENCY: 0

## 2022-05-04 ASSESSMENT — ACTIVITIES OF DAILY LIVING (ADL): CURRENT_FUNCTION: NO ASSISTANCE NEEDED

## 2022-05-09 ENCOUNTER — OFFICE VISIT (OUTPATIENT)
Dept: FAMILY MEDICINE | Facility: CLINIC | Age: 67
End: 2022-05-09
Payer: COMMERCIAL

## 2022-05-09 VITALS
SYSTOLIC BLOOD PRESSURE: 130 MMHG | BODY MASS INDEX: 48.49 KG/M2 | OXYGEN SATURATION: 97 % | TEMPERATURE: 98.2 F | WEIGHT: 247 LBS | HEART RATE: 70 BPM | RESPIRATION RATE: 18 BRPM | DIASTOLIC BLOOD PRESSURE: 82 MMHG | HEIGHT: 60 IN

## 2022-05-09 DIAGNOSIS — Z00.00 ENCOUNTER FOR MEDICARE ANNUAL WELLNESS EXAM: Primary | ICD-10-CM

## 2022-05-09 DIAGNOSIS — E66.01 MORBID OBESITY (H): ICD-10-CM

## 2022-05-09 DIAGNOSIS — E78.5 HYPERLIPIDEMIA LDL GOAL <160: ICD-10-CM

## 2022-05-09 DIAGNOSIS — I10 ESSENTIAL HYPERTENSION: ICD-10-CM

## 2022-05-09 PROCEDURE — 91305 COVID-19,PF,PFIZER (12+ YRS): CPT | Performed by: FAMILY MEDICINE

## 2022-05-09 PROCEDURE — G0438 PPPS, INITIAL VISIT: HCPCS | Performed by: FAMILY MEDICINE

## 2022-05-09 PROCEDURE — 0054A COVID-19,PF,PFIZER (12+ YRS): CPT | Performed by: FAMILY MEDICINE

## 2022-05-09 RX ORDER — LISINOPRIL 30 MG/1
30 TABLET ORAL DAILY
Qty: 90 TABLET | Refills: 3 | Status: SHIPPED | OUTPATIENT
Start: 2022-05-09 | End: 2023-05-10

## 2022-05-09 RX ORDER — ATORVASTATIN CALCIUM 10 MG/1
10 TABLET, FILM COATED ORAL DAILY
Qty: 90 TABLET | Refills: 3 | Status: SHIPPED | OUTPATIENT
Start: 2022-05-09 | End: 2023-05-10

## 2022-05-09 ASSESSMENT — ENCOUNTER SYMPTOMS
NERVOUS/ANXIOUS: 0
MYALGIAS: 0
NAUSEA: 0
HEMATOCHEZIA: 0
ARTHRALGIAS: 0
HEADACHES: 0
SHORTNESS OF BREATH: 0
COUGH: 0
DIARRHEA: 0
SORE THROAT: 0
FREQUENCY: 0
DIZZINESS: 0
DYSURIA: 0
EYE PAIN: 0
FEVER: 0
JOINT SWELLING: 0
WEAKNESS: 0
CHILLS: 0
ABDOMINAL PAIN: 0
PARESTHESIAS: 0
BREAST MASS: 0
PALPITATIONS: 0
CONSTIPATION: 0
HEMATURIA: 0
HEARTBURN: 1

## 2022-05-09 ASSESSMENT — ACTIVITIES OF DAILY LIVING (ADL): CURRENT_FUNCTION: NO ASSISTANCE NEEDED

## 2022-05-09 ASSESSMENT — PAIN SCALES - GENERAL: PAINLEVEL: NO PAIN (0)

## 2022-05-09 NOTE — PROGRESS NOTES
"SUBJECTIVE:   Theodora Hamilton is a 67 year old female who presents for Preventive Visit.    Patient has been advised of split billing requirements and indicates understanding: Yes  Are you in the first 12 months of your Medicare coverage?  No    Healthy Habits:     In general, how would you rate your overall health?  Good    Frequency of exercise:  1 day/week    Duration of exercise:  Less than 15 minutes    Do you usually eat at least 4 servings of fruit and vegetables a day, include whole grains    & fiber and avoid regularly eating high fat or \"junk\" foods?  No    Taking medications regularly:  Yes    Barriers to taking medications:  None    Medication side effects:  Muscle aches and Other    Ability to successfully perform activities of daily living:  No assistance needed    Home Safety:  No safety concerns identified    Hearing Impairment:  Difficulty following a conversation in a noisy restaurant or crowded room    In the past 6 months, have you been bothered by leaking of urine?  No    In general, how would you rate your overall mental or emotional health?  Good      PHQ-2 Total Score: 0    Additional concerns today:  No    Do you feel safe in your environment? Yes    Have you ever done Advance Care Planning? (For example, a Health Directive, POLST, or a discussion with a medical provider or your loved ones about your wishes): Yes, advance care planning is on file.       Fall risk  Fallen 2 or more times in the past year?: No  Any fall with injury in the past year?: No    Cognitive Screening   1) Repeat 3 items (Leader, Season, Table)    2) Clock draw: NORMAL  3) 3 item recall: Recalls 3 objects  Results: 3 items recalled: COGNITIVE IMPAIRMENT LESS LIKELY    Mini-CogTM Copyright RAOUL Souza. Licensed by the author for use in [facility]; reprinted with permission (favio@.[facility]). All rights reserved.      Do you have sleep apnea, excessive snoring or daytime drowsiness?: no       Social History "     Tobacco Use     Smoking status: Former Smoker     Packs/day: 0.00     Years: 0.00     Pack years: 0.00     Types: Cigarettes     Smokeless tobacco: Never Used   Substance Use Topics     Alcohol use: Not Currently     Comment: 5 times per year       Alcohol Use 5/4/2022   Prescreen: >3 drinks/day or >7 drinks/week? Not Applicable   Prescreen: >3 drinks/day or >7 drinks/week? -       Hyperlipidemia Follow-Up      Are you regularly taking any medication or supplement to lower your cholesterol?   Yes- statin    Are you having muscle aches or other side effects that you think could be caused by your cholesterol lowering medication?  No    Hypertension Follow-up      Do you check your blood pressure regularly outside of the clinic? Not often     Are you following a low salt diet? No    Are your blood pressures ever more than 140 on the top number (systolic) OR more   than 90 on the bottom number (diastolic), for example 140/90? No, was 140/74      Current providers sharing in care for this patient include:   Patient Care Team:  Janae Novak DO as PCP - General (Family Practice)  Janae Novak DO as Assigned PCP    The following health maintenance items are reviewed in Epic and correct as of today:  Health Maintenance Due   Topic Date Due     ANNUAL REVIEW OF HM ORDERS  Never done     LUNG CANCER SCREENING  Never done     DTAP/TDAP/TD IMMUNIZATION (1 - Tdap) 07/29/2015     FALL RISK ASSESSMENT  Never done     COVID-19 Vaccine (3 - Booster for Rickey series) 04/13/2022     Breast CA Risk Assessment (FHS-7) 5/4/2022   Do you have a family history of breast, colon, or ovarian cancer? No / Unknown       Mammogram Screening: Recommended mammography every 1-2 years with patient discussion and risk factor consideration  Pertinent mammograms are reviewed under the imaging tab.    Review of Systems   Constitutional: Negative for chills and fever.   HENT: Negative for congestion, ear pain, hearing loss and sore  "throat.    Eyes: Negative for pain and visual disturbance.   Respiratory: Negative for cough and shortness of breath.    Cardiovascular: Negative for chest pain, palpitations and peripheral edema.   Gastrointestinal: Positive for heartburn. Negative for abdominal pain, constipation, diarrhea, hematochezia and nausea.   Breasts:  Negative for tenderness, breast mass and discharge.   Genitourinary: Negative for dysuria, frequency, genital sores, hematuria, pelvic pain, urgency, vaginal bleeding and vaginal discharge.   Musculoskeletal: Negative for arthralgias, joint swelling and myalgias.   Skin: Negative for rash.   Neurological: Negative for dizziness, weakness, headaches and paresthesias.   Psychiatric/Behavioral: Negative for mood changes. The patient is not nervous/anxious.        OBJECTIVE:   /82 (BP Location: Right arm, Patient Position: Chair, Cuff Size: Adult Large)   Pulse 70   Temp 98.2  F (36.8  C) (Oral)   Resp 18   Ht 1.522 m (4' 11.94\")   Wt 112 kg (247 lb)   LMP  (LMP Unknown)   SpO2 97%   BMI 48.33 kg/m   Estimated body mass index is 48.33 kg/m  as calculated from the following:    Height as of this encounter: 1.522 m (4' 11.94\").    Weight as of this encounter: 112 kg (247 lb).  Physical Exam  GENERAL APPEARANCE: healthy, alert and no distress  EYES: Eyes grossly normal to inspection, PERRL and conjunctivae and sclerae normal  HENT: ear canals and TM's normal, nose and mouth without ulcers or lesions, oropharynx clear and oral mucous membranes moist  NECK: no adenopathy, no asymmetry, masses, or scars and thyroid normal to palpation  RESP: lungs clear to auscultation - no rales, rhonchi or wheezes  BREAST: normal without masses, tenderness or nipple discharge and no palpable axillary masses or adenopathy  CV: regular rates and rhythm, normal S1 S2, no S3 or S4 and no murmur, click or rub  ABDOMEN: soft, nontender, no hepatosplenomegaly and no masses  MS: no musculoskeletal defects are " "noted and gait is age appropriate without ataxia  SKIN: no suspicious lesions or rashes  NEURO: Normal strength and tone, sensory exam grossly normal, mentation intact and speech normal  PSYCH: mentation appears normal and affect normal/bright      ASSESSMENT / PLAN:       ICD-10-CM    1. Encounter for Medicare annual wellness exam  Z00.00    2. Morbid obesity (H)  E66.01    3. Hyperlipidemia LDL goal <160  E78.5 atorvastatin (LIPITOR) 10 MG tablet   4. Essential hypertension  I10 lisinopril (ZESTRIL) 30 MG tablet     - All medical problems are stable.  Just due for refills     Patient has been advised of split billing requirements and indicates understanding: Yes    COUNSELING:  Reviewed preventive health counseling, as reflected in patient instructions    Estimated body mass index is 48.33 kg/m  as calculated from the following:    Height as of this encounter: 1.522 m (4' 11.94\").    Weight as of this encounter: 112 kg (247 lb).    Weight management plan: Discussed healthy diet and exercise guidelines    She reports that she has quit smoking. Her smoking use included cigarettes. She smoked 0.00 packs per day for 0.00 years. She has never used smokeless tobacco.      Appropriate preventive services were discussed with this patient, including applicable screening as appropriate for cardiovascular disease, diabetes, osteopenia/osteoporosis, and glaucoma.  As appropriate for age/gender, discussed screening for colorectal cancer, prostate cancer, breast cancer, and cervical cancer. Checklist reviewing preventive services available has been given to the patient.    Reviewed patients plan of care and provided an AVS. The Basic Care Plan (routine screening as documented in Health Maintenance) for Theodora meets the Care Plan requirement. This Care Plan has been established and reviewed with the Patient.    Counseling Resources:  ATP IV Guidelines  Pooled Cohorts Equation Calculator  Breast Cancer Risk Calculator  Breast " Cancer: Medication to Reduce Risk  FRAX Risk Assessment  ICSI Preventive Guidelines  Dietary Guidelines for Americans, 2010  USDA's MyPlate  ASA Prophylaxis  Lung CA Screening    Janae Novak DO  [facility]    Identified Health Risks:    She is at risk for lack of exercise and has been provided with information to increase physical activity for the benefit of her well-being.  The patient was counseled and encouraged to consider modifying their diet and eating habits. She was provided with information on recommended healthy diet options.  The patient was provided with written information regarding signs of hearing loss.

## 2022-05-09 NOTE — PATIENT INSTRUCTIONS
Patient Education   Personalized Prevention Plan  You are due for the preventive services outlined below.  Your care team is available to assist you in scheduling these services.  If you have already completed any of these items, please share that information with your care team to update in your medical record.  Health Maintenance Due   Topic Date Due     ANNUAL REVIEW OF HM ORDERS  Never done     LUNG CANCER SCREENING  Never done     Diptheria Tetanus Pertussis (DTAP/TDAP/TD) Vaccine (1 - Tdap) 07/29/2015     FALL RISK ASSESSMENT  Never done     Discuss Advance Care Planning  10/01/2019     COVID-19 Vaccine (3 - Booster for Rickey series) 04/13/2022       Exercise for a Healthier Heart  You may wonder how you can improve the health of your heart. If you re thinking about exercise, you re on the right track. You don t need to become an athlete. But you do need a certain amount of brisk exercise to help strengthen your heart. If you have been diagnosed with a heart condition, your healthcare provider may advise exercise to help stabilize your condition. To help make exercise a habit, choose safe, fun activities.      Exercise with a friend. When activity is fun, you're more likely to stick with it.   Before you start  Check with your healthcare provider before starting an exercise program. This is especially important if you have not been active for a while. It's also important if you have a long-term (chronic) health problem such as heart disease, diabetes, or obesity. Or if you are at high risk for having these problems.   Why exercise?  Exercising regularly offers many healthy rewards. It can help you do all of the following:     Improve your blood cholesterol level to help prevent further heart trouble    Lower your blood pressure to help prevent a stroke or heart attack    Control diabetes, or reduce your risk of getting this disease    Improve your heart and lung function    Reach and stay at a healthy  weight    Make your muscles stronger so you can stay active    Prevent falls and fractures by slowing the loss of bone mass (osteoporosis)    Manage stress better    Reduce your blood pressure    Improve your sense of self and your body image  Exercise tips      Ease into your routine. Set small goals. Then build on them. If you are not sure what your activity level should be, talk with your healthcare provider first before starting an exercise routine.    Exercise on most days. Aim for a total of 150 minutes (2 hours and 30 minutes) or more of moderate-intensity aerobic activity each week. Or 75 minutes (1 hour and 15 minutes) or more of vigorous-intensity aerobic activity each week. Or try for a combination of both. Moderate activity means that you breathe heavier and your heart rate increases but you can still talk. Think about doing 40 minutes of moderate exercise, 3 to 4 times a week. For best results, activity should last for about 40 minutes to lower blood pressure and cholesterol. It's OK to work up to the 40-minute period over time. Examples of moderate-intensity activity are walking 1 mile in 15 minutes. Or doing 30 to 45 minutes of yard work.    Step up your daily activity level.  Along with your exercise program, try being more active the whole day. Walk instead of drive. Or park further away so that you take more steps each day. Do more household tasks or yard work. You may not be able to meet the advised mount of physical activity. But doing some moderate- or vigorous-intensity aerobic activity can help reduce your risk for heart disease. Your healthcare provider can help you figure out what is best for you.    Choose 1 or more activities you enjoy.  Walking is one of the easiest things you can do. You can also try swimming, riding a bike, dancing, or taking an exercise class.    When to call your healthcare provider  Call your healthcare provider if you have any of these:     Chest pain or feel dizzy  or lightheaded    Burning, tightness, pressure, or heaviness in your chest, neck, shoulders, back, or arms    Abnormal shortness of breath    More joint or muscle pain    A very fast or irregular heartbeat (palpitations)  [employer] last reviewed this educational content on 7/1/2019 2000-2021 [employer]. All rights reserved. This information is not intended as a substitute for professional medical care. Always follow your healthcare professional's instructions.          Understanding USDA MyPlate  The USDA has guidelines to help you make healthy food choices. These are called MyPlate. MyPlate shows the food groups that make up healthy meals using the image of a place setting. Before you eat, think about the healthiest choices for what to put on your plate or in your cup or bowl. To learn more about building a healthy plate, visit www.choosemyplate.gov.    The food groups    Fruits. Any fruit or 100% fruit juice counts as part of the Fruit Group. Fruits may be fresh, canned, frozen, or dried, and may be whole, cut-up, or pureed. Make 1/2 of your plate fruits and vegetables.    Vegetables. Any vegetable or 100% vegetable juice counts as a member of the Vegetable Group. Vegetables may be fresh, frozen, canned, or dried. They can be served raw or cooked and may be whole, cut-up, or mashed. Make 1/2 of your plate fruits and vegetables.    Grains. All foods made from grains are part of the Grains Group. These include wheat, rice, oats, cornmeal, and barley. Grains are often used to make foods such as bread, pasta, oatmeal, cereal, tortillas, and grits. Grains should be no more than 1/4 of your plate. At least half of your grains should be whole grains.    Protein. This group includes meat, poultry, seafood, beans and peas, eggs, processed soy products (such as tofu), nuts (including nut butters), and seeds. Make protein choices no more than 1/4 of your plate. Meat and poultry choices should be lean or low  fat.    Dairy. The Dairy Group includes all fluid milk products and foods made from milk that contain calcium, such as yogurt and cheese. (Foods that have little calcium, such as cream, butter, and cream cheese, are not part of this group.) Most dairy choices should be low-fat or fat-free.    Oils. Oils aren't a food group, but they do contain essential nutrients. However it's important to watch your intake of oils. These are fats that are liquid at room temperature. They include canola, corn, olive, soybean, vegetable, and sunflower oil. Foods that are mainly oil include mayonnaise, certain salad dressings, and soft margarines. You likely already get your daily oil allowance from the foods you eat.  Things to limit  Eating healthy also means limiting these things in your diet:       Salt (sodium). Many processed foods have a lot of sodium. To keep sodium intake down, eat fresh vegetables, meats, poultry, and seafood when possible. Purchase low-sodium, reduced-sodium, or no-salt-added food products at the store. And don't add salt to your meals at home. Instead, season them with herbs and spices such as dill, oregano, cumin, and paprika. Or try adding flavor with lemon or lime zest and juice.    Saturated fat. Saturated fats are most often found in animal products such as beef, pork, and chicken. They are often solid at room temperature, such as butter. To reduce your saturated fat intake, choose leaner cuts of meat and poultry. And try healthier cooking methods such as grilling, broiling, roasting, or baking. For a simple lower-fat swap, use plain nonfat yogurt instead of mayonnaise when making potato salad or macaroni salad.    Added sugars. These are sugars added to foods. They are in foods such as ice cream, candy, soda, fruit drinks, sports drinks, energy drinks, cookies, pastries, jams, and syrups. Cut down on added sugars by sharing sweet treats with a family member or friend. You can also choose fruit for  dessert, and drink water or other unsweetened beverages.     [employer] last reviewed this educational content on 6/1/2020 2000-2021 [employer]. All rights reserved. This information is not intended as a substitute for professional medical care. Always follow your healthcare professional's instructions.          Signs of Hearing Loss      Hearing much better with one ear can be a sign of hearing loss.   Hearing loss is a problem shared by many people. In fact, it is one of the most common health problems, particularly as people age. Most people age 65 and older have some hearing loss. By age 80, almost everyone does. Hearing loss often occurs slowly over the years. So you may not realize your hearing has gotten worse.  Have your hearing checked  Call your healthcare provider if you:    Have to strain to hear normal conversation    Have to watch other people s faces very carefully to follow what they re saying    Need to ask people to repeat what they ve said    Often misunderstand what people are saying    Turn the volume of the television or radio up so high that others complain    Feel that people are mumbling when they re talking to you    Find that the effort to hear leaves you feeling tired and irritated    Notice, when using the phone, that you hear better with one ear than the other  [employer] last reviewed this educational content on 1/1/2020 2000-2021 [employer]. All rights reserved. This information is not intended as a substitute for professional medical care. Always follow your healthcare professional's instructions.

## 2022-11-20 ENCOUNTER — HEALTH MAINTENANCE LETTER (OUTPATIENT)
Age: 67
End: 2022-11-20

## 2023-04-15 ENCOUNTER — HEALTH MAINTENANCE LETTER (OUTPATIENT)
Age: 68
End: 2023-04-15

## 2023-05-03 ASSESSMENT — ENCOUNTER SYMPTOMS
NAUSEA: 0
HEMATURIA: 0
SORE THROAT: 0
DYSURIA: 0
HEARTBURN: 1
ARTHRALGIAS: 0
HEMATOCHEZIA: 0
FREQUENCY: 0
SHORTNESS OF BREATH: 0
MYALGIAS: 0
HEADACHES: 0
EYE PAIN: 0
PARESTHESIAS: 0
NERVOUS/ANXIOUS: 0
DIARRHEA: 0
CHILLS: 0
FEVER: 0
JOINT SWELLING: 0
COUGH: 0
CONSTIPATION: 0
ABDOMINAL PAIN: 0
PALPITATIONS: 0
BREAST MASS: 0
DIZZINESS: 0
WEAKNESS: 0

## 2023-05-03 ASSESSMENT — ACTIVITIES OF DAILY LIVING (ADL): CURRENT_FUNCTION: NO ASSISTANCE NEEDED

## 2023-05-10 ENCOUNTER — OFFICE VISIT (OUTPATIENT)
Dept: FAMILY MEDICINE | Facility: CLINIC | Age: 68
End: 2023-05-10
Payer: COMMERCIAL

## 2023-05-10 ENCOUNTER — ANCILLARY PROCEDURE (OUTPATIENT)
Dept: GENERAL RADIOLOGY | Facility: CLINIC | Age: 68
End: 2023-05-10
Attending: PHYSICIAN ASSISTANT
Payer: COMMERCIAL

## 2023-05-10 ENCOUNTER — ANCILLARY PROCEDURE (OUTPATIENT)
Dept: MAMMOGRAPHY | Facility: CLINIC | Age: 68
End: 2023-05-10
Payer: COMMERCIAL

## 2023-05-10 VITALS
SYSTOLIC BLOOD PRESSURE: 139 MMHG | TEMPERATURE: 98.3 F | WEIGHT: 246 LBS | HEIGHT: 61 IN | DIASTOLIC BLOOD PRESSURE: 88 MMHG | BODY MASS INDEX: 46.44 KG/M2 | OXYGEN SATURATION: 98 % | HEART RATE: 71 BPM

## 2023-05-10 DIAGNOSIS — Z00.00 ENCOUNTER FOR MEDICARE ANNUAL WELLNESS EXAM: Primary | ICD-10-CM

## 2023-05-10 DIAGNOSIS — L98.9 SKIN LESION: ICD-10-CM

## 2023-05-10 DIAGNOSIS — R05.3 CHRONIC COUGH: ICD-10-CM

## 2023-05-10 DIAGNOSIS — E66.01 MORBID OBESITY (H): ICD-10-CM

## 2023-05-10 DIAGNOSIS — R07.82 INTERCOSTAL PAIN: ICD-10-CM

## 2023-05-10 DIAGNOSIS — I10 ESSENTIAL HYPERTENSION: ICD-10-CM

## 2023-05-10 DIAGNOSIS — R74.8 ELEVATED ALKALINE PHOSPHATASE LEVEL: ICD-10-CM

## 2023-05-10 DIAGNOSIS — E66.01 MORBID (SEVERE) OBESITY DUE TO EXCESS CALORIES (H): ICD-10-CM

## 2023-05-10 DIAGNOSIS — R12 HEARTBURN: ICD-10-CM

## 2023-05-10 DIAGNOSIS — E78.5 HYPERLIPIDEMIA LDL GOAL <160: ICD-10-CM

## 2023-05-10 DIAGNOSIS — Z12.31 VISIT FOR SCREENING MAMMOGRAM: ICD-10-CM

## 2023-05-10 LAB
ALBUMIN SERPL BCG-MCNC: 4.1 G/DL (ref 3.5–5.2)
ALP SERPL-CCNC: 122 U/L (ref 35–104)
ALT SERPL W P-5'-P-CCNC: 25 U/L (ref 10–35)
ANION GAP SERPL CALCULATED.3IONS-SCNC: 13 MMOL/L (ref 7–15)
AST SERPL W P-5'-P-CCNC: 24 U/L (ref 10–35)
BILIRUB SERPL-MCNC: 0.5 MG/DL
BUN SERPL-MCNC: 17.2 MG/DL (ref 8–23)
CALCIUM SERPL-MCNC: 9.1 MG/DL (ref 8.8–10.2)
CHLORIDE SERPL-SCNC: 109 MMOL/L (ref 98–107)
CHOLEST SERPL-MCNC: 191 MG/DL
CREAT SERPL-MCNC: 1.01 MG/DL (ref 0.51–0.95)
DEPRECATED HCO3 PLAS-SCNC: 21 MMOL/L (ref 22–29)
GFR SERPL CREATININE-BSD FRML MDRD: 60 ML/MIN/1.73M2
GLUCOSE SERPL-MCNC: 90 MG/DL (ref 70–99)
HDLC SERPL-MCNC: 74 MG/DL
LDLC SERPL CALC-MCNC: 98 MG/DL
MAGNESIUM SERPL-MCNC: 2.1 MG/DL (ref 1.7–2.3)
NONHDLC SERPL-MCNC: 117 MG/DL
POTASSIUM SERPL-SCNC: 4.1 MMOL/L (ref 3.4–5.3)
PROT SERPL-MCNC: 7.5 G/DL (ref 6.4–8.3)
SODIUM SERPL-SCNC: 143 MMOL/L (ref 136–145)
TRIGL SERPL-MCNC: 95 MG/DL

## 2023-05-10 PROCEDURE — 77063 BREAST TOMOSYNTHESIS BI: CPT | Mod: TC | Performed by: RADIOLOGY

## 2023-05-10 PROCEDURE — 71046 X-RAY EXAM CHEST 2 VIEWS: CPT | Mod: TC | Performed by: RADIOLOGY

## 2023-05-10 PROCEDURE — 99397 PER PM REEVAL EST PAT 65+ YR: CPT | Performed by: PHYSICIAN ASSISTANT

## 2023-05-10 PROCEDURE — 99214 OFFICE O/P EST MOD 30 MIN: CPT | Mod: 25 | Performed by: PHYSICIAN ASSISTANT

## 2023-05-10 PROCEDURE — 80061 LIPID PANEL: CPT | Performed by: PHYSICIAN ASSISTANT

## 2023-05-10 PROCEDURE — 77067 SCR MAMMO BI INCL CAD: CPT | Mod: TC | Performed by: RADIOLOGY

## 2023-05-10 PROCEDURE — 36415 COLL VENOUS BLD VENIPUNCTURE: CPT | Performed by: PHYSICIAN ASSISTANT

## 2023-05-10 PROCEDURE — 80053 COMPREHEN METABOLIC PANEL: CPT | Performed by: PHYSICIAN ASSISTANT

## 2023-05-10 PROCEDURE — 83735 ASSAY OF MAGNESIUM: CPT | Performed by: PHYSICIAN ASSISTANT

## 2023-05-10 RX ORDER — ATORVASTATIN CALCIUM 10 MG/1
10 TABLET, FILM COATED ORAL DAILY
Qty: 90 TABLET | Refills: 3 | Status: SHIPPED | OUTPATIENT
Start: 2023-05-10 | End: 2024-05-20

## 2023-05-10 RX ORDER — LISINOPRIL 30 MG/1
30 TABLET ORAL DAILY
Qty: 90 TABLET | Refills: 3 | Status: SHIPPED | OUTPATIENT
Start: 2023-05-10 | End: 2023-10-05

## 2023-05-10 ASSESSMENT — ENCOUNTER SYMPTOMS
EYE PAIN: 0
WEAKNESS: 0
HEADACHES: 0
DYSURIA: 0
NERVOUS/ANXIOUS: 0
HEMATURIA: 0
HEARTBURN: 1
MYALGIAS: 0
COUGH: 0
SHORTNESS OF BREATH: 0
ABDOMINAL PAIN: 0
DIZZINESS: 0
CHILLS: 0
NAUSEA: 0
FEVER: 0
SORE THROAT: 0
JOINT SWELLING: 0
PALPITATIONS: 0
DIARRHEA: 0
PARESTHESIAS: 0
ARTHRALGIAS: 0
FREQUENCY: 0
HEMATOCHEZIA: 0
CONSTIPATION: 0
BREAST MASS: 0

## 2023-05-10 ASSESSMENT — ACTIVITIES OF DAILY LIVING (ADL): CURRENT_FUNCTION: NO ASSISTANCE NEEDED

## 2023-05-10 NOTE — PROGRESS NOTES
"SUBJECTIVE:   Prema is a 68 year old who presents for Preventive Visit.      5/10/2023     8:09 AM   Additional Questions   Roomed by miguelina mai     Patient has been advised of split billing requirements and indicates understanding: Yes  Are you in the first 12 months of your Medicare coverage?  No    Healthy Habits:     In general, how would you rate your overall health?  Good    Frequency of exercise:  1 day/week    Duration of exercise:  Less than 15 minutes    Do you usually eat at least 4 servings of fruit and vegetables a day, include whole grains    & fiber and avoid regularly eating high fat or \"junk\" foods?  No    Taking medications regularly:  Yes    Medication side effects:  Muscle aches    Ability to successfully perform activities of daily living:  No assistance needed    Home Safety:  No safety concerns identified    Hearing Impairment:  Difficulty following a conversation in a noisy restaurant or crowded room    In the past 6 months, have you been bothered by leaking of urine?  No    In general, how would you rate your overall mental or emotional health?  Excellent      PHQ-2 Total Score: 0    Additional concerns today:  No      Have you ever done Advance Care Planning? (For example, a Health Directive, POLST, or a discussion with a medical provider or your loved ones about your wishes): Yes, advance care planning is on file.       Fall risk  Fallen 2 or more times in the past year?: No  Any fall with injury in the past year?: No    Cognitive Screening   1) Repeat 3 items (Leader, Season, Table)    2) Clock draw: NORMAL  3) 3 item recall: Recalls 3 objects  Results: 3 items recalled: COGNITIVE IMPAIRMENT LESS LIKELY    Mini-CogTM Copyright RAOUL Souza. Licensed by the author for use in [facility]; reprinted with permission (favio@.[email or web address]). All rights reserved.      Do you have sleep apnea, excessive snoring or daytime drowsiness?: no    Reviewed and updated as needed this visit by clinical staff   " Tobacco  Allergies  Meds  Problems  Med Hx  Surg Hx  Fam Hx          Reviewed and updated as needed this visit by Provider   Tobacco  Allergies  Meds  Problems  Med Hx  Surg Hx  Fam Hx         Social History     Tobacco Use     Smoking status: Former     Packs/day: 0.00     Years: 0.00     Pack years: 0.00     Types: Cigarettes     Smokeless tobacco: Never   Vaping Use     Vaping status: Never Used   Substance Use Topics     Alcohol use: Yes     Comment: 5 times per year             5/3/2023     9:09 AM   Alcohol Use   Prescreen: >3 drinks/day or >7 drinks/week? No     Do you have a current opioid prescription? No  Do you use any other controlled substances or medications that are not prescribed by a provider? None      Started 2/23, wheezing at times, pain in her chest- ride side and can radiate to the back. Can make it go away. Smoked only as a teen-5-6 years maybe.   Dry cough.     Having muscle spasms- usually in her large muscles.     Has been having a lot of acid reflux.   Not happy with her dermatologist would like a recommendation.     Current providers sharing in care for this patient include:   Patient Care Team:  Shirley Warner PA-C as PCP - General (Family Medicine)  Janae Novak MD as Assigned PCP    The following health maintenance items are reviewed in Epic and correct as of today:  Health Maintenance   Topic Date Due     ANNUAL REVIEW OF HM ORDERS  Never done     LIPID  12/06/2022     MAMMO SCREENING  01/11/2023     MEDICARE ANNUAL WELLNESS VISIT  05/09/2023     FALL RISK ASSESSMENT  05/10/2024     DEXA  03/11/2025     DTAP/TDAP/TD IMMUNIZATION (2 - Td or Tdap) 07/29/2025     ADVANCE CARE PLANNING  05/10/2028     COLORECTAL CANCER SCREENING  11/15/2029     HEPATITIS C SCREENING  Completed     PHQ-2 (once per calendar year)  Completed     INFLUENZA VACCINE  Completed     Pneumococcal Vaccine: 65+ Years  Completed     ZOSTER IMMUNIZATION  Completed     COVID-19 Vaccine  Completed  "    IPV IMMUNIZATION  Aged Out     MENINGITIS IMMUNIZATION  Aged Out     LUNG CANCER SCREENING  Discontinued     Lab work is in process          5/4/2022     4:46 PM   Breast CA Risk Assessment (FHS-7)   Do you have a family history of breast, colon, or ovarian cancer? No / Unknown       click delete button to remove this line now  Mammogram Screening: Recommended mammography every 1-2 years with patient discussion and risk factor consideration  Pertinent mammograms are reviewed under the imaging tab.    Review of Systems   Constitutional: Negative for chills and fever.   HENT: Negative for congestion, ear pain, hearing loss and sore throat.    Eyes: Negative for pain and visual disturbance.   Respiratory: Negative for cough and shortness of breath.    Cardiovascular: Negative for chest pain, palpitations and peripheral edema.   Gastrointestinal: Positive for heartburn. Negative for abdominal pain, constipation, diarrhea, hematochezia and nausea.   Breasts:  Negative for tenderness, breast mass and discharge.   Genitourinary: Negative for dysuria, frequency, genital sores, hematuria, pelvic pain, urgency, vaginal bleeding and vaginal discharge.   Musculoskeletal: Negative for arthralgias, joint swelling and myalgias.   Skin: Negative for rash.   Neurological: Negative for dizziness, weakness, headaches and paresthesias.   Psychiatric/Behavioral: Negative for mood changes. The patient is not nervous/anxious.      Constitutional, HEENT, cardiovascular, pulmonary, gi and gu systems are negative, except as otherwise noted.    OBJECTIVE:   /88 (BP Location: Left arm, Patient Position: Chair, Cuff Size: Adult Large)   Pulse 71   Temp 98.3  F (36.8  C) (Oral)   Ht 1.537 m (5' 0.5\")   Wt 111.6 kg (246 lb)   LMP  (LMP Unknown)   SpO2 98%   BMI 47.25 kg/m   Estimated body mass index is 47.25 kg/m  as calculated from the following:    Height as of this encounter: 1.537 m (5' 0.5\").    Weight as of this encounter: " 111.6 kg (246 lb).  Physical Exam  GENERAL: obese, alert and no distress  EYES: Eyes grossly normal to inspection, PERRL and conjunctivae and sclerae normal  HENT: ear canals and TM's normal, nose and mouth without ulcers or lesions  NECK: no adenopathy, no asymmetry, masses, or scars and thyroid normal to palpation  RESP: lungs clear to auscultation - no rales, rhonchi or wheezes  CV: regular rate and rhythm, normal S1 S2, no S3 or S4, no murmur, click or rub, no peripheral edema and peripheral pulses strong  ABDOMEN: soft, nontender, no hepatosplenomegaly, no masses and bowel sounds normal  MS: no gross musculoskeletal defects noted, no edema  SKIN: no suspicious lesions or rashes  NEURO: Normal strength and tone, mentation intact and speech normal  PSYCH: mentation appears normal, affect normal/bright    Diagnostic Test Results:  none     ASSESSMENT / PLAN:   (Z00.00) Encounter for Medicare annual wellness exam  (primary encounter diagnosis)  Comment:   Plan: PRIMARY CARE FOLLOW-UP SCHEDULING            (E78.5) Hyperlipidemia LDL goal <160  Comment:   Plan: Lipid panel reflex to direct LDL Fasting,         Comprehensive metabolic panel, Magnesium,         atorvastatin (LIPITOR) 10 MG tablet        Check labs and refilled.     (E66.01) Morbid obesity (H)  Comment:   Plan: encouraged weight loss. Weight loss will help better control HTN.     (I10) Essential hypertension  Plan: lisinopril (ZESTRIL) 30 MG tablet        Stable, slight cough could be related to ACE, but has been on for years and only recently developed.     (L98.9) Skin lesion  Comment:   Plan: Adult Dermatology Referral        New referral.     (R07.82) Intercostal pain  Comment:   Plan: XR Chest 2 Views        Rule out underlying lesion/mass    Chronic cough  Suspect allergic component but heartburn and ACE could be contributing. Patient will try over the counter zyrtec for the spring.     Heartburn  Ok to use omeprazole daily for 2 months then  return to as needed. If not improving at that point will need an EGD.             COUNSELING:  Reviewed preventive health counseling, as reflected in patient instructions       Regular exercise       Healthy diet/nutrition        She reports that she has quit smoking. Her smoking use included cigarettes. She has never used smokeless tobacco.      Appropriate preventive services were discussed with this patient, including applicable screening as appropriate for cardiovascular disease, diabetes, osteopenia/osteoporosis, and glaucoma.  As appropriate for age/gender, discussed screening for colorectal cancer, prostate cancer, breast cancer, and cervical cancer. Checklist reviewing preventive services available has been given to the patient.    Reviewed patients plan of care and provided an AVS. The Basic Care Plan (routine screening as documented in Health Maintenance) for Theodora meets the Care Plan requirement. This Care Plan has been established and reviewed with the Patient.          Shirley Warner PA-C  [facility]    Identified Health Risks:

## 2023-05-10 NOTE — PATIENT INSTRUCTIONS
Zyrtec (generic) 10mg once per day at night for next 2 months.     Omeprazole 20mg once per day in the morning for 2 months then as needed.     Consider Voltaren gel for joint pains.   Patient Education   Personalized Prevention Plan  You are due for the preventive services outlined below.  Your care team is available to assist you in scheduling these services.  If you have already completed any of these items, please share that information with your care team to update in your medical record.  Health Maintenance Due   Topic Date Due    ANNUAL REVIEW OF HM ORDERS  Never done    Cholesterol Lab  12/06/2022    Mammogram  01/11/2023    Annual Wellness Visit  05/09/2023       Exercise for a Healthier Heart  You may wonder how you can improve the health of your heart. If you re thinking about exercise, you re on the right track. You don t need to become an athlete. But you do need a certain amount of brisk exercise to help strengthen your heart. If you have been diagnosed with a heart condition, your healthcare provider may advise exercise to help your condition. To help make exercise a habit, choose safe, fun activities.      Exercise with a friend. When activity is fun, you're more likely to stick with it.     Before you start  Check with your healthcare provider before starting an exercise program. This is especially important if you haven't been active for a while. It's also important if you have a long-term (chronic) health problem such as heart disease, diabetes, or obesity. Also check with your provider if you're at high risk for having these problems.   Why exercise?  Exercising regularly offers many healthy rewards. It can help you do all of these:   Improve your blood cholesterol level to help prevent further heart trouble.  Lower your blood pressure to help prevent a stroke or heart attack.  Control diabetes or reduce your risk of getting this disease.  Improve your heart and lung function.  Reach and stay at  a healthy weight.  Make your muscles stronger so you can stay active.  Prevent falls and fractures by slowing the loss of bone mass (osteoporosis).  Manage stress better.  Improve your sense of self and your body image.  Exercise tips    Ease into your routine. Set small goals. Then build on them. Talk with your healthcare provider first before starting an exercise routine if you're not sure what your activity level should be.  Exercise on most days. Aim for a total of at least 150 minutes (2 hours and 30 minutes) or more of moderate-intensity aerobic activity each week. You could also do 75 minutes (1 hour and 15 minutes) or more of vigorous-intensity aerobic activity each week. Or try for a combination of both. Moderate activity means that you breathe heavier and your heart rate increases, but you can still talk. Think about doing at least 30 minutes of moderate exercise, 5 times a week. It's OK to work up to the 30-minute period over time. Examples of moderate-intensity activity are brisk walking, gardening, and water aerobics.  Step up your daily activity level.  Along with your exercise program, try being more active the whole day. Walk instead of drive. Or park further away so that you take more steps each day. Do more household tasks or yard work. You may not be able to meet the advised amount of physical activity. But doing some moderate- or vigorous-intensity aerobic activity can help reduce your risk for heart disease. Your healthcare provider can help you figure out what is best for you.  Choose 1 or more activities you enjoy.  Walking is one of the easiest things you can do. You can also try swimming, riding a bike, dancing, or taking an exercise class.    Call 911  Call 911 right away if any of these occur:   Chest pain that doesn't go away quickly with rest  New burning, tightness, pressure, or heaviness in your chest, neck, shoulders, back, or arms  Abnormal or severe shortness of breath  A very fast  or irregular heartbeat (palpitations)  Fainting  When to call your healthcare provider  Call your healthcare provider if you have any of these:   Dizziness or lightheadedness  Mild shortness of breath or chest pain  Increased or new joint or muscle pain    [employer] last reviewed this educational content on 7/1/2022 2000-2022 [employer]. All rights reserved. This information is not intended as a substitute for professional medical care. Always follow your healthcare professional's instructions.          Understanding USDA MyPlate  The USDA has guidelines to help you make healthy food choices. These are called MyPlate. MyPlate shows the food groups that make up healthy meals using the image of a place setting. Before you eat, think about the healthiest choices for what to put on your plate or in your cup or bowl. To learn more about building a healthy plate, visit www.choosemyplate.gov.     The food groups  Fruits. Any fruit or 100% fruit juice counts as part of the Fruit Group. Fruits may be fresh, canned, frozen, or dried, and may be whole, cut-up, or pureed. Make 1/2 of your plate fruits and vegetables.  Vegetables. Any vegetable or 100% vegetable juice counts as a member of the Vegetable Group. Vegetables may be fresh, frozen, canned, or dried. They can be served raw or cooked and may be whole, cut-up, or mashed. Make 1/2 of your plate fruits and vegetables.  Grains. All foods made from grains are part of the Grains Group. These include wheat, rice, oats, cornmeal, and barley. Grains are often used to make foods such as bread, pasta, oatmeal, cereal, tortillas, and grits. Grains should be no more than 1/4 of your plate. At least half of your grains should be whole grains.  Protein. This group includes meat, poultry, seafood, beans and peas, eggs, processed soy products (such as tofu), nuts (including nut butters), and seeds. Make protein choices no more than 1/4 of your plate. Meat and poultry  choices should be lean or low fat.  Dairy. The Dairy Group includes all fluid milk products and foods made from milk that contain calcium, such as yogurt and cheese. (Foods that have little calcium, such as cream, butter, and cream cheese, are not part of this group.) Most dairy choices should be low-fat or fat-free.  Oils. Oils aren't a food group, but they do contain essential nutrients. However it's important to watch your intake of oils. These are fats that are liquid at room temperature. They include canola, corn, olive, soybean, vegetable, and sunflower oil. Foods that are mainly oil include mayonnaise, certain salad dressings, and soft margarines. You likely already get your daily oil allowance from the foods you eat.  Things to limit  Eating healthy also means limiting these things in your diet:  Salt (sodium). Many processed foods have a lot of sodium. To keep sodium intake down, eat fresh vegetables, meats, poultry, and seafood when possible. Purchase low-sodium, reduced-sodium, or no-salt-added food products at the store. And don't add salt to your meals at home. Instead, season them with herbs and spices such as dill, oregano, cumin, and paprika. Or try adding flavor with lemon or lime zest and juice.  Saturated fat. Saturated fats are most often found in animal products such as beef, pork, and chicken. They are often solid at room temperature, such as butter. To reduce your saturated fat intake, choose leaner cuts of meat and poultry. And try healthier cooking methods such as grilling, broiling, roasting, or baking. For a simple lower-fat swap, use plain nonfat yogurt instead of mayonnaise when making potato salad or macaroni salad.  Added sugars. These are sugars added to foods. They are in foods such as ice cream, candy, soda, fruit drinks, sports drinks, energy drinks, cookies, pastries, jams, and syrups. Cut down on added sugars by sharing sweet treats with a family member or friend. You can also  choose fruit for dessert, and drink water or other unsweetened beverages.  [employer] last reviewed this educational content on 6/1/2020 2000-2022 [employer]. All rights reserved. This information is not intended as a substitute for professional medical care. Always follow your healthcare professional's instructions.          Signs of Hearing Loss  Hearing loss is a problem shared by many people. In fact, it's one of the most common health problems, particularly as people age. Most people aged 65 and older have some hearing loss. By age 80, almost everyone does. Hearing loss often occurs slowly over the years. So, you may not realize your hearing has gotten worse.   When sudden hearing loss occurs, it's important to contact your healthcare provider right away. Your provider will do a medical exam and a hearing exam as soon as possible. This is to help find the cause and type of your sudden hearing loss. Based on your diagnosis, your healthcare provider will discuss possible treatments.      Hearing much better with one ear can be a sign of hearing loss.     Have your hearing checked  Call your healthcare provider if you:   Have to strain to hear normal conversation  Have to watch other people s faces very carefully to follow what they re saying  Need to ask people to repeat what they ve said  Often misunderstand what people are saying  Turn the volume of the television or radio up so high that others complain  Feel that people are mumbling when they re talking to you  Find that the effort to hear leaves you feeling tired and irritated  Notice, when using the phone, that you hear better with one ear than the other  [employer] last reviewed this educational content on 6/1/2022 2000-2022 [employer]. All rights reserved. This information is not intended as a substitute for professional medical care. Always follow your healthcare professional's instructions.

## 2023-05-11 ENCOUNTER — MYC MEDICAL ADVICE (OUTPATIENT)
Dept: FAMILY MEDICINE | Facility: CLINIC | Age: 68
End: 2023-05-11
Payer: COMMERCIAL

## 2023-05-11 NOTE — RESULT ENCOUNTER NOTE
Brandon Lloyd,     Your labs are all stable except for an elevated alkaline phosphatase. There are several things that can increase this so I would like you to make a lab only appointment to get some additional labs.   Let me know if you have questions.   Shirley Warner PA-C

## 2023-05-12 NOTE — TELEPHONE ENCOUNTER
Octaviot sent.    Santiago Riggins, RN  Triage Nurse  [facility], [facility]

## 2023-05-15 ENCOUNTER — LAB (OUTPATIENT)
Dept: LAB | Facility: CLINIC | Age: 68
End: 2023-05-15
Payer: COMMERCIAL

## 2023-05-15 DIAGNOSIS — I10 ESSENTIAL HYPERTENSION: ICD-10-CM

## 2023-05-15 DIAGNOSIS — E66.01 MORBID OBESITY (H): ICD-10-CM

## 2023-05-15 DIAGNOSIS — E66.01 MORBID (SEVERE) OBESITY DUE TO EXCESS CALORIES (H): ICD-10-CM

## 2023-05-15 DIAGNOSIS — R74.8 ELEVATED ALKALINE PHOSPHATASE LEVEL: ICD-10-CM

## 2023-05-15 LAB
BASOPHILS # BLD AUTO: 0 10E3/UL (ref 0–0.2)
BASOPHILS NFR BLD AUTO: 0 %
EOSINOPHIL # BLD AUTO: 0.2 10E3/UL (ref 0–0.7)
EOSINOPHIL NFR BLD AUTO: 3 %
ERYTHROCYTE [DISTWIDTH] IN BLOOD BY AUTOMATED COUNT: 13.3 % (ref 10–15)
HCT VFR BLD AUTO: 48.6 % (ref 35–47)
HGB BLD-MCNC: 16.1 G/DL (ref 11.7–15.7)
IMM GRANULOCYTES # BLD: 0 10E3/UL
IMM GRANULOCYTES NFR BLD: 0 %
LYMPHOCYTES # BLD AUTO: 1.6 10E3/UL (ref 0.8–5.3)
LYMPHOCYTES NFR BLD AUTO: 28 %
MCH RBC QN AUTO: 29.9 PG (ref 26.5–33)
MCHC RBC AUTO-ENTMCNC: 33.1 G/DL (ref 31.5–36.5)
MCV RBC AUTO: 90 FL (ref 78–100)
MONOCYTES # BLD AUTO: 0.4 10E3/UL (ref 0–1.3)
MONOCYTES NFR BLD AUTO: 7 %
NEUTROPHILS # BLD AUTO: 3.4 10E3/UL (ref 1.6–8.3)
NEUTROPHILS NFR BLD AUTO: 61 %
PLATELET # BLD AUTO: 204 10E3/UL (ref 150–450)
RBC # BLD AUTO: 5.39 10E6/UL (ref 3.8–5.2)
WBC # BLD AUTO: 5.6 10E3/UL (ref 4–11)

## 2023-05-15 PROCEDURE — 36415 COLL VENOUS BLD VENIPUNCTURE: CPT

## 2023-05-15 PROCEDURE — 82306 VITAMIN D 25 HYDROXY: CPT

## 2023-05-15 PROCEDURE — 85025 COMPLETE CBC W/AUTO DIFF WBC: CPT

## 2023-05-15 PROCEDURE — 84443 ASSAY THYROID STIM HORMONE: CPT

## 2023-05-15 PROCEDURE — 80053 COMPREHEN METABOLIC PANEL: CPT

## 2023-05-15 PROCEDURE — 83970 ASSAY OF PARATHORMONE: CPT

## 2023-05-16 LAB
ALBUMIN SERPL BCG-MCNC: 4.5 G/DL (ref 3.5–5.2)
ALP SERPL-CCNC: 136 U/L (ref 35–104)
ALT SERPL W P-5'-P-CCNC: 28 U/L (ref 10–35)
ANION GAP SERPL CALCULATED.3IONS-SCNC: 16 MMOL/L (ref 7–15)
AST SERPL W P-5'-P-CCNC: 26 U/L (ref 10–35)
BILIRUB SERPL-MCNC: 0.8 MG/DL
BUN SERPL-MCNC: 21.2 MG/DL (ref 8–23)
CALCIUM SERPL-MCNC: 9.6 MG/DL (ref 8.8–10.2)
CHLORIDE SERPL-SCNC: 104 MMOL/L (ref 98–107)
CREAT SERPL-MCNC: 1 MG/DL (ref 0.51–0.95)
DEPRECATED CALCIDIOL+CALCIFEROL SERPL-MC: 12 UG/L (ref 20–75)
DEPRECATED HCO3 PLAS-SCNC: 20 MMOL/L (ref 22–29)
GFR SERPL CREATININE-BSD FRML MDRD: 61 ML/MIN/1.73M2
GLUCOSE SERPL-MCNC: 96 MG/DL (ref 70–99)
POTASSIUM SERPL-SCNC: 4.2 MMOL/L (ref 3.4–5.3)
PROT SERPL-MCNC: 8.2 G/DL (ref 6.4–8.3)
PTH-INTACT SERPL-MCNC: 60 PG/ML (ref 15–65)
SODIUM SERPL-SCNC: 140 MMOL/L (ref 136–145)
TSH SERPL DL<=0.005 MIU/L-ACNC: 1.06 UIU/ML (ref 0.3–4.2)

## 2023-05-17 DIAGNOSIS — E55.9 VITAMIN D DEFICIENCY: ICD-10-CM

## 2023-05-17 DIAGNOSIS — R74.8 ELEVATED ALKALINE PHOSPHATASE LEVEL: Primary | ICD-10-CM

## 2023-05-17 RX ORDER — ERGOCALCIFEROL 1.25 MG/1
50000 CAPSULE, LIQUID FILLED ORAL WEEKLY
Qty: 4 CAPSULE | Refills: 1 | Status: SHIPPED | OUTPATIENT
Start: 2023-05-17 | End: 2023-08-04

## 2023-05-17 NOTE — RESULT ENCOUNTER NOTE
Brandon Lloyd,     Your vitamin D level came back low which may be contributing to this high alkaline phosphatase level. I have prescribed prescription strength vitamin D3 once per week for the next 8 weeks for you. After you finish this then I will have you do another lab only appointment to make sure we have increased your vitamin D levels sufficiently. Let me know if you have questions.   Shirley Warner PA-C

## 2023-07-28 ENCOUNTER — LAB (OUTPATIENT)
Dept: LAB | Facility: CLINIC | Age: 68
End: 2023-07-28
Payer: COMMERCIAL

## 2023-07-28 DIAGNOSIS — R74.8 ELEVATED ALKALINE PHOSPHATASE LEVEL: ICD-10-CM

## 2023-07-28 DIAGNOSIS — E55.9 VITAMIN D DEFICIENCY: ICD-10-CM

## 2023-07-28 LAB
ALBUMIN SERPL BCG-MCNC: 4.3 G/DL (ref 3.5–5.2)
ALP SERPL-CCNC: 121 U/L (ref 35–104)
ALT SERPL W P-5'-P-CCNC: 22 U/L (ref 0–50)
ANION GAP SERPL CALCULATED.3IONS-SCNC: 14 MMOL/L (ref 7–15)
AST SERPL W P-5'-P-CCNC: 28 U/L (ref 0–45)
BILIRUB SERPL-MCNC: 0.8 MG/DL
BUN SERPL-MCNC: 22.3 MG/DL (ref 8–23)
CALCIUM SERPL-MCNC: 9.2 MG/DL (ref 8.8–10.2)
CHLORIDE SERPL-SCNC: 107 MMOL/L (ref 98–107)
CREAT SERPL-MCNC: 1.14 MG/DL (ref 0.51–0.95)
DEPRECATED CALCIDIOL+CALCIFEROL SERPL-MC: 15 UG/L (ref 20–75)
DEPRECATED HCO3 PLAS-SCNC: 21 MMOL/L (ref 22–29)
GFR SERPL CREATININE-BSD FRML MDRD: 52 ML/MIN/1.73M2
GLUCOSE SERPL-MCNC: 104 MG/DL (ref 70–99)
POTASSIUM SERPL-SCNC: 4.3 MMOL/L (ref 3.4–5.3)
PROT SERPL-MCNC: 7.4 G/DL (ref 6.4–8.3)
SODIUM SERPL-SCNC: 142 MMOL/L (ref 136–145)

## 2023-07-28 PROCEDURE — 82306 VITAMIN D 25 HYDROXY: CPT

## 2023-07-28 PROCEDURE — 36415 COLL VENOUS BLD VENIPUNCTURE: CPT

## 2023-07-28 PROCEDURE — 80053 COMPREHEN METABOLIC PANEL: CPT

## 2023-08-04 DIAGNOSIS — E55.9 VITAMIN D DEFICIENCY: ICD-10-CM

## 2023-08-04 PROBLEM — N18.30 CKD (CHRONIC KIDNEY DISEASE) STAGE 3, GFR 30-59 ML/MIN (H): Status: ACTIVE | Noted: 2023-08-04

## 2023-08-04 RX ORDER — ERGOCALCIFEROL 1.25 MG/1
50000 CAPSULE, LIQUID FILLED ORAL WEEKLY
Qty: 4 CAPSULE | Refills: 1 | Status: SHIPPED | OUTPATIENT
Start: 2023-08-04 | End: 2023-11-20

## 2023-08-04 RX ORDER — CHOLECALCIFEROL (VITAMIN D3) 50 MCG
1 TABLET ORAL DAILY
COMMUNITY
Start: 2023-08-04

## 2023-08-04 NOTE — RESULT ENCOUNTER NOTE
Prema,    Your vitamin D is low again. I've sent a refill for the high dose vitamin D. After 8 weeks, you can switch to over-the-counter vitamin D 2000 units daily indefinitely.     Your blood glucose is fine.     We use blood tests to check how well your kidneys are functioning. These tests measure creatinine. They also calculate eGFR (estimated glomerular filtration rate). Your eGFR is less than 60. This means you have stage 3 chronic kidney disease. This is quite common and not something you should worry about. We will do regular tests to monitor your kidney function. We will also help you control risk factors that might worsen your kidney function. Continue your blood pressure medication.     Awilda Rascon MD

## 2023-09-25 ENCOUNTER — OFFICE VISIT (OUTPATIENT)
Dept: FAMILY MEDICINE | Facility: CLINIC | Age: 68
End: 2023-09-25
Payer: COMMERCIAL

## 2023-09-25 VITALS
DIASTOLIC BLOOD PRESSURE: 70 MMHG | BODY MASS INDEX: 46.91 KG/M2 | WEIGHT: 244.2 LBS | HEART RATE: 69 BPM | OXYGEN SATURATION: 98 % | TEMPERATURE: 98.4 F | SYSTOLIC BLOOD PRESSURE: 150 MMHG | RESPIRATION RATE: 17 BRPM

## 2023-09-25 DIAGNOSIS — N18.31 STAGE 3A CHRONIC KIDNEY DISEASE (H): Primary | ICD-10-CM

## 2023-09-25 DIAGNOSIS — I10 ESSENTIAL HYPERTENSION: ICD-10-CM

## 2023-09-25 DIAGNOSIS — Z78.0 ASYMPTOMATIC MENOPAUSAL STATE: ICD-10-CM

## 2023-09-25 DIAGNOSIS — R12 HEARTBURN: ICD-10-CM

## 2023-09-25 DIAGNOSIS — R31.29 MICROHEMATURIA: ICD-10-CM

## 2023-09-25 LAB
ALBUMIN UR-MCNC: NEGATIVE MG/DL
APPEARANCE UR: CLEAR
BACTERIA #/AREA URNS HPF: ABNORMAL /HPF
BILIRUB UR QL STRIP: NEGATIVE
COLOR UR AUTO: YELLOW
GLUCOSE UR STRIP-MCNC: NEGATIVE MG/DL
HGB UR QL STRIP: ABNORMAL
KETONES UR STRIP-MCNC: NEGATIVE MG/DL
LEUKOCYTE ESTERASE UR QL STRIP: NEGATIVE
NITRATE UR QL: NEGATIVE
PH UR STRIP: 5.5 [PH] (ref 5–7)
RBC #/AREA URNS AUTO: ABNORMAL /HPF
RBC CLUMPS #/AREA URNS HPF: ABNORMAL /HPF
SP GR UR STRIP: 1.02 (ref 1–1.03)
SQUAMOUS #/AREA URNS AUTO: ABNORMAL /LPF
UROBILINOGEN UR STRIP-ACNC: 0.2 E.U./DL
WBC #/AREA URNS AUTO: ABNORMAL /HPF

## 2023-09-25 PROCEDURE — G0008 ADMIN INFLUENZA VIRUS VAC: HCPCS | Performed by: PHYSICIAN ASSISTANT

## 2023-09-25 PROCEDURE — 90662 IIV NO PRSV INCREASED AG IM: CPT | Performed by: PHYSICIAN ASSISTANT

## 2023-09-25 PROCEDURE — 99214 OFFICE O/P EST MOD 30 MIN: CPT | Mod: 25 | Performed by: PHYSICIAN ASSISTANT

## 2023-09-25 PROCEDURE — 81001 URINALYSIS AUTO W/SCOPE: CPT | Performed by: PHYSICIAN ASSISTANT

## 2023-09-25 NOTE — PROGRESS NOTES
"  Assessment & Plan     Stage 3a chronic kidney disease (H)  Will get urine albumin. Monitor yearly. Stop NSAIDS. Use as needed.   - Albumin Random Urine Quantitative with Creat Ratio; Future  - UA Macroscopic with reflex to Microscopic and Culture; Future  - UA Macroscopic with reflex to Microscopic and Culture  - UA Microscopic with Reflex to Culture    Essential hypertension  High today, patient will check at home and send [employer] message with readings. May need an increase in lisinopril.     Asymptomatic menopausal state  Due for recheck.   - DX Hip/Pelvis/Spine; Future    Heartburn  Persistent. Needs EGD.   - Adult GI  Referral - Procedure Only; Future             BMI:   Estimated body mass index is 46.91 kg/m  as calculated from the following:    Height as of 5/10/23: 1.537 m (5' 0.5\").    Weight as of this encounter: 110.8 kg (244 lb 3.2 oz).           Shirley Warner PA-C  [facility] SINGH Lloyd is a 68 year old, presenting for the following health issues:  follow-up and Health Maintenance        9/25/2023     8:46 AM   Additional Questions   Roomed by miguelina mai       Pt would like to go over new dx     History of Present Illness       CKD: She is not using over the counter pain medicine.     She eats 0-1 servings of fruits and vegetables daily.She consumes 0 sweetened beverage(s) daily.She exercises with enough effort to increase her heart rate 9 or less minutes per day.  She exercises with enough effort to increase her heart rate 3 or less days per week. She is missing 1 dose(s) of medications per week.  She is not taking prescribed medications regularly due to remembering to take.     Takes her lisinopril around 730am.     Heartburn is good when on omeprazole. Did 2 months of straight omeprazole and then now has to take it every 3 days.   Every once in awhile gets bad. No vomiting.   Belching. Every once in awhile take a sip of mylanta.     Rare ibuprofen use. "               Review of Systems         Objective    BP (!) 180/91 (BP Location: Left arm, Patient Position: Chair, Cuff Size: Adult Regular)   Pulse 69   Temp 98.4  F (36.9  C) (Oral)   Resp 17   Wt 110.8 kg (244 lb 3.2 oz)   LMP  (LMP Unknown)   SpO2 98%   BMI 46.91 kg/m    Body mass index is 46.91 kg/m .  Physical Exam   GENERAL: healthy, alert and no distress

## 2023-09-27 ENCOUNTER — DOCUMENTATION ONLY (OUTPATIENT)
Dept: FAMILY MEDICINE | Facility: CLINIC | Age: 68
End: 2023-09-27
Payer: COMMERCIAL

## 2023-09-27 NOTE — RESULT ENCOUNTER NOTE
Prema,     Interestingly, your urine showed some microscopic blood. I would like to have you repeat a urine test with a lab only appointment in about 3-4 weeks. If the blood is still there then we will get imaging of the kidneys to see if there is a stone or something causing the blood. Let me know what questions you have.   Shirley Warner PA-C

## 2023-10-04 ENCOUNTER — MYC MEDICAL ADVICE (OUTPATIENT)
Dept: FAMILY MEDICINE | Facility: CLINIC | Age: 68
End: 2023-10-04
Payer: COMMERCIAL

## 2023-10-04 DIAGNOSIS — I10 ESSENTIAL HYPERTENSION: ICD-10-CM

## 2023-10-05 RX ORDER — LISINOPRIL 40 MG/1
40 TABLET ORAL DAILY
Qty: 90 TABLET | Refills: 0 | Status: SHIPPED | OUTPATIENT
Start: 2023-10-05 | End: 2023-12-26

## 2023-10-05 NOTE — TELEPHONE ENCOUNTER
"Please see [employer] message with updated BP readings. Entered in Flowsheets.    Per OV note 9/25/23:  \"Essential hypertension  High today, patient will check at home and send [employer] message with readings. May need an increase in lisinopril. \"    ALPHONSO Carpio RN  [facility], [place]  Primary Care    "

## 2023-10-09 ENCOUNTER — TELEPHONE (OUTPATIENT)
Dept: GASTROENTEROLOGY | Facility: CLINIC | Age: 68
End: 2023-10-09
Payer: COMMERCIAL

## 2023-10-09 NOTE — TELEPHONE ENCOUNTER
"Endoscopy Scheduling Screen    Have you had a positive Covid test in the last 14 days?  No    Are you active on MyChart?   Yes    What insurance is in the chart?  Other:  BCBS/MEDICARE     Ordering/Referring Provider: SACHA MCWILLIAMS    (If ordering provider performs procedure, schedule with ordering provider unless otherwise instructed. )    BMI: Estimated body mass index is 46.91 kg/m  as calculated from the following:    Height as of 5/10/23: 1.537 m (5' 0.5\").    Weight as of 9/25/23: 110.8 kg (244 lb 3.2 oz).     Sedation Ordered  moderate sedation.   If patient BMI > 50 do not schedule in [facility].    If patient BMI > 45 do not schedule at [facility].    Are you taking methadone or Suboxone?  No    Are you taking any prescription medications for pain 3 or more times per week?   No    Do you have a history of malignant hyperthermia or adverse reaction to anesthesia?  No    (Females) Are you currently pregnant?   No     Have you been diagnosed or told you have pulmonary hypertension?   No    Do you have an LVAD?  No    Have you been told you have moderate to severe sleep apnea?  No    Have you been told you have COPD, asthma, or any other lung disease?  No    Do you have any heart conditions?  No     Have you ever had an organ transplant?   No    Have you ever had or are you awaiting a heart or lung transplant?   No    Have you had a stroke or transient ischemic attack (TIA aka \"mini stroke\" in the last 6 months?   No    Have you been diagnosed with or been told you have cirrhosis of the liver?   No    Are you currently on dialysis?   No    Do you need assistance transferring?   No    BMI: Estimated body mass index is 46.91 kg/m  as calculated from the following:    Height as of 5/10/23: 1.537 m (5' 0.5\").    Weight as of 9/25/23: 110.8 kg (244 lb 3.2 oz).     Is patients BMI > 40 and scheduling location [facility]?  No    Do you take an injectable medication for weight loss or diabetes (excluding insulin)?  No    Do you take the " medication Naltrexone?  No    Do you take blood thinners?  No       Prep   Are you currently on dialysis or do you have chronic kidney disease?  Yes ([facility] Prep)    Do you have a diagnosis of diabetes?  No    Do you have a diagnosis of cystic fibrosis (CF)?  No    On a regular basis do you go 3 -5 days between bowel movements?  No    BMI > 40?  No    Preferred Pharmacy:      [facility] [address] [facility] [address] [facility]  [address] [place] [address] [facility] [address]  Phone: [phone] Fax: [phone]      Final Scheduling Details   Procedure scheduled  Upper endoscopy (EGD)    Surgeon:  YAHIR      Date of procedure:  11/20/2023     Pre-OP / PAC:   No - Not required for this site.    Location  [facility] - [facility] - Per order.    Sedation   Moderate Sedation - Per order.      Patient Reminders:   You will receive a call from a Nurse to review instructions and health history.  This assessment must be completed prior to your procedure.  Failure to complete the Nurse assessment may result in the procedure being cancelled.      On the day of your procedure, please designate an adult(s) who can drive you home stay with you for the next 24 hours. The medicines used in the exam will make you sleepy. You will not be able to drive.      You cannot take public transportation, ride share services, or non-medical taxi service without a responsible caregiver.  Medical transport services are allowed with the requirement that a responsible caregiver will receive you at your destination.  We require that drivers and caregivers are confirmed prior to your procedure.

## 2023-10-27 ENCOUNTER — LAB (OUTPATIENT)
Dept: LAB | Facility: CLINIC | Age: 68
End: 2023-10-27
Payer: COMMERCIAL

## 2023-10-27 ENCOUNTER — ANCILLARY PROCEDURE (OUTPATIENT)
Dept: BONE DENSITY | Facility: CLINIC | Age: 68
End: 2023-10-27
Attending: PHYSICIAN ASSISTANT
Payer: COMMERCIAL

## 2023-10-27 DIAGNOSIS — R31.29 MICROHEMATURIA: ICD-10-CM

## 2023-10-27 DIAGNOSIS — Z78.0 ASYMPTOMATIC MENOPAUSAL STATE: ICD-10-CM

## 2023-10-27 DIAGNOSIS — I10 ESSENTIAL HYPERTENSION: ICD-10-CM

## 2023-10-27 DIAGNOSIS — N18.31 STAGE 3A CHRONIC KIDNEY DISEASE (H): ICD-10-CM

## 2023-10-27 LAB
ALBUMIN UR-MCNC: NEGATIVE MG/DL
APPEARANCE UR: CLEAR
BACTERIA #/AREA URNS HPF: ABNORMAL /HPF
BILIRUB UR QL STRIP: NEGATIVE
COLOR UR AUTO: YELLOW
CREAT UR-MCNC: 108 MG/DL
GLUCOSE UR STRIP-MCNC: NEGATIVE MG/DL
HGB UR QL STRIP: ABNORMAL
KETONES UR STRIP-MCNC: NEGATIVE MG/DL
LEUKOCYTE ESTERASE UR QL STRIP: NEGATIVE
MICROALBUMIN UR-MCNC: <12 MG/L
MICROALBUMIN/CREAT UR: NORMAL MG/G{CREAT}
NITRATE UR QL: NEGATIVE
PH UR STRIP: 5.5 [PH] (ref 5–7)
RBC #/AREA URNS AUTO: ABNORMAL /HPF
SP GR UR STRIP: 1.02 (ref 1–1.03)
UROBILINOGEN UR STRIP-ACNC: 0.2 E.U./DL
WBC #/AREA URNS AUTO: ABNORMAL /HPF

## 2023-10-27 PROCEDURE — 82043 UR ALBUMIN QUANTITATIVE: CPT

## 2023-10-27 PROCEDURE — 77080 DXA BONE DENSITY AXIAL: CPT | Performed by: INTERNAL MEDICINE

## 2023-10-27 PROCEDURE — 82570 ASSAY OF URINE CREATININE: CPT

## 2023-10-27 PROCEDURE — 81001 URINALYSIS AUTO W/SCOPE: CPT

## 2023-11-03 ENCOUNTER — TELEPHONE (OUTPATIENT)
Dept: GASTROENTEROLOGY | Facility: CLINIC | Age: 68
End: 2023-11-03
Payer: COMMERCIAL

## 2023-11-03 NOTE — TELEPHONE ENCOUNTER
Pre assessment completed for upcoming procedure.      Procedure details:    Patient scheduled for Upper endoscopy (EGD) on 11/20/23.     Arrival time: 0600. Procedure time 0700    Pre op exam needed? N/A    Facility location: [facility]; [address] [place] [address], [address] Floor, [place] [address]    Sedation type: Conscious sedation     Indication for procedure:     persistent GERD       COVID policy reviewed.    Designated  policy reviewed. Instructed to have someone stay 6 hours post procedure.       Chart review:     Electronic implanted devices? No    Recent diagnosis of diverticulitis within the last 6 weeks?  No    Diabetic? No    Diabetic medication HOLDING recommendations: (if applicable)  Oral diabetic medications: No  Diabetic injectables: No  Insulin: No    Medication review:    Anticoagulants? No    NSAIDS? No    Other medication HOLDING recommendations:  N/A      Prep for procedure:     Prep instructions sent via [employer]     Reviewed procedure prep instructions.     Patient verbalized understanding and had no questions or concerns at this time.        Leila Mejia RN  Endoscopy Procedure Pre Assessment RN  [phone] option 4

## 2023-11-20 ENCOUNTER — HOSPITAL ENCOUNTER (OUTPATIENT)
Facility: AMBULATORY SURGERY CENTER | Age: 68
Discharge: HOME | End: 2023-11-20
Attending: INTERNAL MEDICINE | Admitting: INTERNAL MEDICINE
Payer: COMMERCIAL

## 2023-11-20 VITALS
WEIGHT: 240 LBS | DIASTOLIC BLOOD PRESSURE: 88 MMHG | HEIGHT: 60 IN | OXYGEN SATURATION: 97 % | RESPIRATION RATE: 16 BRPM | TEMPERATURE: 96.9 F | SYSTOLIC BLOOD PRESSURE: 154 MMHG | BODY MASS INDEX: 47.12 KG/M2 | HEART RATE: 63 BPM

## 2023-11-20 DIAGNOSIS — K21.00 GASTROESOPHAGEAL REFLUX DISEASE WITH ESOPHAGITIS WITHOUT HEMORRHAGE: Primary | ICD-10-CM

## 2023-11-20 LAB — UPPER GI ENDOSCOPY: NORMAL

## 2023-11-20 PROCEDURE — 88305 TISSUE EXAM BY PATHOLOGIST: CPT | Mod: 26 | Performed by: PATHOLOGY

## 2023-11-20 PROCEDURE — 88305 TISSUE EXAM BY PATHOLOGIST: CPT | Mod: TC | Performed by: INTERNAL MEDICINE

## 2023-11-20 PROCEDURE — 43239 EGD BIOPSY SINGLE/MULTIPLE: CPT | Performed by: INTERNAL MEDICINE

## 2023-11-20 RX ORDER — SODIUM CHLORIDE, SODIUM LACTATE, POTASSIUM CHLORIDE, CALCIUM CHLORIDE 600; 310; 30; 20 MG/100ML; MG/100ML; MG/100ML; MG/100ML
INJECTION, SOLUTION INTRAVENOUS CONTINUOUS
Status: DISCONTINUED | OUTPATIENT
Start: 2023-11-20 | End: 2023-11-20 | Stop reason: HOSPADM

## 2023-11-20 RX ORDER — OMEPRAZOLE 40 MG/1
40 CAPSULE, DELAYED RELEASE ORAL DAILY
Qty: 90 CAPSULE | Refills: 1 | Status: SHIPPED | OUTPATIENT
Start: 2023-11-20 | End: 2024-05-20

## 2023-11-20 RX ORDER — NALOXONE HYDROCHLORIDE 0.4 MG/ML
0.2 INJECTION, SOLUTION INTRAMUSCULAR; INTRAVENOUS; SUBCUTANEOUS
Status: DISCONTINUED | OUTPATIENT
Start: 2023-11-20 | End: 2023-11-21 | Stop reason: HOSPADM

## 2023-11-20 RX ORDER — ONDANSETRON 2 MG/ML
4 INJECTION INTRAMUSCULAR; INTRAVENOUS
Status: DISCONTINUED | OUTPATIENT
Start: 2023-11-20 | End: 2023-11-20 | Stop reason: HOSPADM

## 2023-11-20 RX ORDER — ONDANSETRON 4 MG/1
4 TABLET, ORALLY DISINTEGRATING ORAL EVERY 6 HOURS PRN
Status: DISCONTINUED | OUTPATIENT
Start: 2023-11-20 | End: 2023-11-21 | Stop reason: HOSPADM

## 2023-11-20 RX ORDER — FLUMAZENIL 0.1 MG/ML
0.2 INJECTION, SOLUTION INTRAVENOUS
Status: ACTIVE | OUTPATIENT
Start: 2023-11-20 | End: 2023-11-20

## 2023-11-20 RX ORDER — NALOXONE HYDROCHLORIDE 0.4 MG/ML
0.4 INJECTION, SOLUTION INTRAMUSCULAR; INTRAVENOUS; SUBCUTANEOUS
Status: DISCONTINUED | OUTPATIENT
Start: 2023-11-20 | End: 2023-11-21 | Stop reason: HOSPADM

## 2023-11-20 RX ORDER — LIDOCAINE 40 MG/G
CREAM TOPICAL
Status: DISCONTINUED | OUTPATIENT
Start: 2023-11-20 | End: 2023-11-20 | Stop reason: HOSPADM

## 2023-11-20 RX ORDER — ONDANSETRON 2 MG/ML
4 INJECTION INTRAMUSCULAR; INTRAVENOUS EVERY 6 HOURS PRN
Status: DISCONTINUED | OUTPATIENT
Start: 2023-11-20 | End: 2023-11-21 | Stop reason: HOSPADM

## 2023-11-20 RX ORDER — FENTANYL CITRATE 50 UG/ML
INJECTION, SOLUTION INTRAMUSCULAR; INTRAVENOUS PRN
Status: DISCONTINUED | OUTPATIENT
Start: 2023-11-20 | End: 2023-11-20 | Stop reason: HOSPADM

## 2023-11-20 RX ORDER — PROCHLORPERAZINE MALEATE 5 MG
5 TABLET ORAL EVERY 6 HOURS PRN
Status: DISCONTINUED | OUTPATIENT
Start: 2023-11-20 | End: 2023-11-21 | Stop reason: HOSPADM

## 2023-11-20 NOTE — H&P
Theodora Hamilton  [id number]  female  68 year old      Reason for procedure/surgery: heartburn    Patient Active Problem List   Diagnosis    Asymptomatic postmenopausal status    OA (Osteoarthritis) - left great toe    HYPERLIPIDEMIA LDL GOAL <160    Morbid obesity (H)    Essential hypertension    Vitamin D deficiency    CKD (chronic kidney disease) stage 3, GFR 30-59 ml/min (H)       Past Surgical History:    Past Surgical History:   Procedure Laterality Date    COLONOSCOPY      COLONOSCOPY N/A 11/15/2019    Procedure: COLONOSCOPY, WITH POLYPECTOMY AND BIOPSY;  Surgeon: Jacob Nieves MD;  [facility]    INCISION AND DRAINAGE FINGER, COMBINED Left 7/9/2019    Procedure: Incision and Drainage of Left Index Finger;  Surgeon: Lowell Whelan MD;  Location: [facility] OR    NO HISTORY OF SURGERY         Past Medical History:   Past Medical History:   Diagnosis Date    Arthritis     CKD (chronic kidney disease) stage 3, GFR 30-59 ml/min (H)     Essential hypertension 11/13/2020    Vitamin D deficiency 08/04/2023       Social History:   Social History     Tobacco Use    Smoking status: Former     Packs/day: 0.00     Years: 0.00     Additional pack years: 0.00     Total pack years: 0.00     Types: Cigarettes    Smokeless tobacco: Never   Substance Use Topics    Alcohol use: Yes     Comment: 5 times per year       Family History:   Family History   Problem Relation Age of Onset    C.A.D. Maternal Grandmother     C.A.ELY. Maternal Grandfather         17 MI's per history    C.A.ELY. Paternal Grandmother     Diabetes Paternal Grandmother     C.A.D. Paternal Grandfather     Diabetes Paternal Grandfather     Hypertension Mother     C.A.D. Mother         pacemaker    Circulatory Father         Carotid artery stenosis    Respiratory Father 75        COPD    Coronary Artery Disease Father     Substance Abuse Father     Diabetes Paternal Aunt     Diabetes Paternal Uncle     Coronary Artery Disease Brother     Breast Cancer  Cousin     Asthma No family hx of     Cerebrovascular Disease No family hx of     Cancer - colorectal No family hx of     Prostate Cancer No family hx of        Allergies:   Allergies   Allergen Reactions    Amoxicillin Hives       Active Medications:   Current Outpatient Medications   Medication Sig Dispense Refill    alum & mag hydroxide-simethicone (MYLANTA/MAALOX) 200-200-20 MG/5ML SUSP suspension Take 30 mLs by mouth once as needed for indigestion or heartburn (uses 2-3 times per week)      atorvastatin (LIPITOR) 10 MG tablet Take 1 tablet (10 mg) by mouth daily 90 tablet 3    diphenhydrAMINE (BENADRYL) 25 MG tablet Take 25-50 mg by mouth daily as needed for allergies      lisinopril (ZESTRIL) 40 MG tablet Take 1 tablet (40 mg) by mouth daily 90 tablet 0    omeprazole 20 MG tablet Take 1 tablet (20 mg) by mouth daily Take 30-60 minutes before a meal. (Patient taking differently: Take 20 mg by mouth as needed Take 30-60 minutes before a meal.) 30 tablet 1    vitamin D3 (CHOLECALCIFEROL) 50 mcg (2000 units) tablet Take 1 tablet (50 mcg) by mouth daily         Systemic Review:   CONSTITUTIONAL: NEGATIVE for fever, chills, change in weight  ENT/MOUTH: NEGATIVE for ear, mouth and throat problems  RESP: NEGATIVE for significant cough or SOB  CV: NEGATIVE for chest pain, palpitations or peripheral edema    Physical Examination:   Vital Signs: BP (!) 171/89 (BP Location: Right arm)   Pulse 67   Temp 97.3  F (36.3  C) (Temporal)   Resp 16   Ht 1.524 m (5')   Wt 108.9 kg (240 lb)   LMP  (LMP Unknown)   SpO2 97%   BMI 46.87 kg/m    GENERAL: healthy, alert and no distress  NECK: no adenopathy, no asymmetry, masses, or scars  RESP: lungs clear to auscultation - no rales, rhonchi or wheezes  CV: regular rate and rhythm, normal S1 S2, no S3 or S4, no murmur, click or rub, no peripheral edema and peripheral pulses strong  ABDOMEN: soft, nontender, no hepatosplenomegaly, no masses and bowel sounds normal  MS: no gross  musculoskeletal defects noted, no edema    ASA Classification: (II)  Mild systemic disease  Airway Exam: Mallampati Score: Class III (Visualization of only the base of uvula)    Plan: Appropriate to proceed as scheduled.      Forrest Villanueva MD  11/20/2023    PCP:  Shirley Warner

## 2023-11-20 NOTE — H&P
Theodora Hamilton  [id number]  female  68 year old      Reason for procedure/surgery: heartburn    Patient Active Problem List   Diagnosis    Asymptomatic postmenopausal status    OA (Osteoarthritis) - left great toe    HYPERLIPIDEMIA LDL GOAL <160    Morbid obesity (H)    Essential hypertension    Vitamin D deficiency    CKD (chronic kidney disease) stage 3, GFR 30-59 ml/min (H)       Past Surgical History:    Past Surgical History:   Procedure Laterality Date    COLONOSCOPY      COLONOSCOPY N/A 11/15/2019    Procedure: COLONOSCOPY, WITH POLYPECTOMY AND BIOPSY;  Surgeon: Jacob Nieves MD;  [facility]    INCISION AND DRAINAGE FINGER, COMBINED Left 7/9/2019    Procedure: Incision and Drainage of Left Index Finger;  Surgeon: Lowell Whelan MD;  Location[facility]    NO HISTORY OF SURGERY         Past Medical History:   Past Medical History:   Diagnosis Date    Arthritis     CKD (chronic kidney disease) stage 3, GFR 30-59 ml/min (H)     Essential hypertension 11/13/2020    Vitamin D deficiency 08/04/2023       Social History:   Social History     Tobacco Use    Smoking status: Former     Packs/day: 0.00     Years: 0.00     Additional pack years: 0.00     Total pack years: 0.00     Types: Cigarettes    Smokeless tobacco: Never   Substance Use Topics    Alcohol use: Yes     Comment: 5 times per year       Family History:   Family History   Problem Relation Age of Onset    C.A.D. Maternal Grandmother     C.A.ELY. Maternal Grandfather         17 MI's per history    C.A.ELY. Paternal Grandmother     Diabetes Paternal Grandmother     C.A.D. Paternal Grandfather     Diabetes Paternal Grandfather     Hypertension Mother     C.A.D. Mother         pacemaker    Circulatory Father         Carotid artery stenosis    Respiratory Father 75        COPD    Coronary Artery Disease Father     Substance Abuse Father     Diabetes Paternal Aunt     Diabetes Paternal Uncle     Coronary Artery Disease Brother     Breast Cancer  Cousin     Asthma No family hx of     Cerebrovascular Disease No family hx of     Cancer - colorectal No family hx of     Prostate Cancer No family hx of        Allergies:   Allergies   Allergen Reactions    Amoxicillin Hives       Active Medications:   Current Outpatient Medications   Medication Sig Dispense Refill    alum & mag hydroxide-simethicone (MYLANTA/MAALOX) 200-200-20 MG/5ML SUSP suspension Take 30 mLs by mouth once as needed for indigestion or heartburn (uses 2-3 times per week)      atorvastatin (LIPITOR) 10 MG tablet Take 1 tablet (10 mg) by mouth daily 90 tablet 3    diphenhydrAMINE (BENADRYL) 25 MG tablet Take 25-50 mg by mouth daily as needed for allergies      lisinopril (ZESTRIL) 40 MG tablet Take 1 tablet (40 mg) by mouth daily 90 tablet 0    omeprazole 20 MG tablet Take 1 tablet (20 mg) by mouth daily Take 30-60 minutes before a meal. (Patient taking differently: Take 20 mg by mouth as needed Take 30-60 minutes before a meal.) 30 tablet 1    vitamin D3 (CHOLECALCIFEROL) 50 mcg (2000 units) tablet Take 1 tablet (50 mcg) by mouth daily         Systemic Review:   CONSTITUTIONAL: NEGATIVE for fever, chills, change in weight  ENT/MOUTH: NEGATIVE for ear, mouth and throat problems  RESP: NEGATIVE for significant cough or SOB  CV: NEGATIVE for chest pain, palpitations or peripheral edema    Physical Examination:   Vital Signs: BP (!) 171/89 (BP Location: Right arm)   Pulse 67   Temp 97.3  F (36.3  C) (Temporal)   Resp 16   Ht 1.524 m (5')   Wt 108.9 kg (240 lb)   LMP  (LMP Unknown)   SpO2 97%   BMI 46.87 kg/m    GENERAL: healthy, alert and no distress  NECK: no adenopathy, no asymmetry, masses, or scars  RESP: lungs clear to auscultation - no rales, rhonchi or wheezes  CV: regular rate and rhythm, normal S1 S2, no S3 or S4, no murmur, click or rub, no peripheral edema and peripheral pulses strong  ABDOMEN: soft, nontender, no hepatosplenomegaly, no masses and bowel sounds normal  MS: no gross  musculoskeletal defects noted, no edema    ASA Classification: (II)  Mild systemic disease  Airway Exam: Mallampati Score: Class III (Visualization of only the base of uvula)    Plan: Appropriate to proceed as scheduled.      Forrest Villanueva MD  11/20/2023    PCP:  Shirley Warner

## 2023-11-21 LAB
PATH REPORT.COMMENTS IMP SPEC: NORMAL
PATH REPORT.COMMENTS IMP SPEC: NORMAL
PATH REPORT.FINAL DX SPEC: NORMAL
PATH REPORT.GROSS SPEC: NORMAL
PATH REPORT.MICROSCOPIC SPEC OTHER STN: NORMAL
PATH REPORT.RELEVANT HX SPEC: NORMAL
PHOTO IMAGE: NORMAL

## 2023-11-23 ENCOUNTER — MYC MEDICAL ADVICE (OUTPATIENT)
Dept: FAMILY MEDICINE | Facility: CLINIC | Age: 68
End: 2023-11-23
Payer: COMMERCIAL

## 2023-11-23 DIAGNOSIS — I10 ESSENTIAL HYPERTENSION: Primary | ICD-10-CM

## 2023-11-27 RX ORDER — AMLODIPINE BESYLATE 2.5 MG/1
2.5 TABLET ORAL DAILY
Qty: 30 TABLET | Refills: 0 | Status: SHIPPED | OUTPATIENT
Start: 2023-11-27 | End: 2023-12-22

## 2023-12-25 DIAGNOSIS — I10 ESSENTIAL HYPERTENSION: ICD-10-CM

## 2023-12-26 RX ORDER — LISINOPRIL 40 MG/1
40 TABLET ORAL DAILY
Qty: 90 TABLET | Refills: 0 | Status: SHIPPED | OUTPATIENT
Start: 2023-12-26 | End: 2024-02-14

## 2024-01-24 ENCOUNTER — TELEPHONE (OUTPATIENT)
Dept: FAMILY MEDICINE | Facility: CLINIC | Age: 69
End: 2024-01-24
Payer: COMMERCIAL

## 2024-01-24 NOTE — TELEPHONE ENCOUNTER
Patient calling in and states [facility] is giving her reminders that Albumin Random Urine Quantitative with Creat Ratio [[id number]] (Order [id number]) needs to be completed or will be expiring. Rn sees this was ordered on 9/25/24 when patient came in for appointment. RN sees providers message     On 9/25/23  Prema,      Interestingly, your urine showed some microscopic blood. I would like to have you repeat a urine test with a lab only appointment in about 3-4 weeks. If the blood is still there then we will get imaging of the kidneys to see if there is a stone or something causing the blood. Let me know what questions you have.   Shirley Warner PA-C           Patient did  come back and complete repeat labs on 10/27/24 where provider stated everything was ok in result notes and did not advise on any further lab work.     Patient stated since it was dated for 9/25/24 and she has come back to do the repeat labs she will not worry about it as she was not advised she needed anymore follow up and everything was ok. Patient has an appointment on 2/14/24 with provider where she will follow up if needed.       Vi Estrada RN on 1/24/2024 at 11:40 AM

## 2024-01-26 DIAGNOSIS — I10 ESSENTIAL HYPERTENSION: ICD-10-CM

## 2024-01-26 RX ORDER — AMLODIPINE BESYLATE 2.5 MG/1
2.5 TABLET ORAL DAILY
Qty: 30 TABLET | Refills: 0 | Status: SHIPPED | OUTPATIENT
Start: 2024-01-26 | End: 2024-02-14

## 2024-02-14 ENCOUNTER — OFFICE VISIT (OUTPATIENT)
Dept: FAMILY MEDICINE | Facility: CLINIC | Age: 69
End: 2024-02-14
Payer: COMMERCIAL

## 2024-02-14 VITALS
SYSTOLIC BLOOD PRESSURE: 138 MMHG | HEIGHT: 61 IN | WEIGHT: 255.6 LBS | HEART RATE: 84 BPM | TEMPERATURE: 97.8 F | DIASTOLIC BLOOD PRESSURE: 80 MMHG | BODY MASS INDEX: 48.26 KG/M2 | OXYGEN SATURATION: 100 % | RESPIRATION RATE: 24 BRPM

## 2024-02-14 DIAGNOSIS — R51.9 SEVERE HEADACHE: ICD-10-CM

## 2024-02-14 DIAGNOSIS — Z82.49 FAMILY HISTORY OF BRAIN ANEURYSM: Primary | ICD-10-CM

## 2024-02-14 DIAGNOSIS — E66.01 MORBID OBESITY (H): ICD-10-CM

## 2024-02-14 DIAGNOSIS — I10 ESSENTIAL HYPERTENSION: ICD-10-CM

## 2024-02-14 DIAGNOSIS — N18.31 STAGE 3A CHRONIC KIDNEY DISEASE (H): ICD-10-CM

## 2024-02-14 DIAGNOSIS — R29.818 OTHER SYMPTOMS AND SIGNS INVOLVING THE NERVOUS SYSTEM: ICD-10-CM

## 2024-02-14 PROCEDURE — 99214 OFFICE O/P EST MOD 30 MIN: CPT | Performed by: PHYSICIAN ASSISTANT

## 2024-02-14 RX ORDER — RESPIRATORY SYNCYTIAL VIRUS VACCINE 120MCG/0.5
0.5 KIT INTRAMUSCULAR ONCE
Qty: 1 EACH | Refills: 0 | Status: CANCELLED | OUTPATIENT
Start: 2024-02-14 | End: 2024-02-14

## 2024-02-14 RX ORDER — AMLODIPINE BESYLATE 2.5 MG/1
2.5 TABLET ORAL DAILY
Qty: 90 TABLET | Refills: 3 | Status: SHIPPED | OUTPATIENT
Start: 2024-02-14 | End: 2024-05-20

## 2024-02-14 RX ORDER — LISINOPRIL 40 MG/1
40 TABLET ORAL DAILY
Qty: 90 TABLET | Refills: 3 | Status: SHIPPED | OUTPATIENT
Start: 2024-02-14 | End: 2024-05-20

## 2024-02-14 RX ORDER — DIAZEPAM 5 MG
5 TABLET ORAL ONCE
Qty: 1 TABLET | Refills: 0 | Status: SHIPPED | OUTPATIENT
Start: 2024-02-14 | End: 2024-03-14

## 2024-02-14 ASSESSMENT — ENCOUNTER SYMPTOMS: HEADACHES: 1

## 2024-02-14 NOTE — PROGRESS NOTES
"  Assessment & Plan     1. Family history of brain aneurysm    2. Essential hypertension    3. Morbid obesity (H)    4. Severe headache    5. Other symptoms and signs involving the nervous system    6. Stage 3a chronic kidney disease (H)      Patient with a family history of brain aneurysm with new headaches that while brief are severe. Consideration for temporal arteritis due to location but so brief and spread apart. Will get brain MRA.   Kidney disease is stable, blood pressure under better control, refilled medications. Weight loss would help improve blood pressure control.             BMI  Estimated body mass index is 49.1 kg/m  as calculated from the following:    Height as of this encounter: 1.537 m (5' 0.5\").    Weight as of this encounter: 115.9 kg (255 lb 9.6 oz).             Pawel Lloyd is a 68 year old, presenting for the following health issues:  Headache and Health Maintenance        2/14/2024     7:42 AM   Additional Questions   Roomed by miguelina mai     History of Present Illness       Headaches:   Since the patient's last clinic visit, headaches are: no change  The patient is getting headaches:  Twice  She is able to do normal daily activities when she has a migraine.  The patient is taking the following rescue/relief medications:  No rescue/relief medications   Patient states \"I get total relief\" from the rescue/relief medications.   The patient is taking the following medications to prevent migraines:  No medications to prevent migraines  In the past 4 weeks, the patient has gone to an Urgent Care or Emergency Room 0 times times due to headaches.    She eats 0-1 servings of fruits and vegetables daily.She consumes 0 sweetened beverage(s) daily.She exercises with enough effort to increase her heart rate 10 to 19 minutes per day.  She exercises with enough effort to increase her heart rate 3 or less days per week.   She is taking medications regularly.     Start at the base of the skull like " "pressure, goes to the temple on the left-feels like an ice pick. No slurred speech and no confusion.   Lasted about a minute and like 3 minutes. 3 months between them.     Has a family history of aneurysm in uncle and cousin. Patient with anxiety that this is causing these symptoms.                   Objective    /80 (BP Location: Left arm, Patient Position: Chair, Cuff Size: Adult Large)   Pulse 84   Temp 97.8  F (36.6  C) (Oral)   Resp 24   Ht 1.537 m (5' 0.5\")   Wt 115.9 kg (255 lb 9.6 oz)   LMP  (LMP Unknown)   SpO2 100%   BMI 49.10 kg/m    Body mass index is 49.1 kg/m .  Physical Exam   GENERAL: alert and no distress  EYES: Eyes grossly normal to inspection, PERRL and conjunctivae and sclerae normal, extra occular movements intact   HENT: ear canals and TM's normal, nose and mouth without ulcers or lesions  NECK: no adenopathy, no asymmetry, masses, or scars- negative bruit  RESP: lungs clear to auscultation - no rales, rhonchi or wheezes  CV: regular rate and rhythm, normal S1 S2, no S3 or S4, no murmur, click or rub, no peripheral edema   MS: no gross musculoskeletal defects noted, no edema  NEURO: Normal strength and tone, mentation intact and speech normal, CN II-XII intact.   PSYCH: mentation appears normal, affect normal/bright            Signed Electronically by: Shirley Warner PA-C    "

## 2024-03-12 ENCOUNTER — ANCILLARY PROCEDURE (OUTPATIENT)
Dept: MRI IMAGING | Facility: CLINIC | Age: 69
End: 2024-03-12
Attending: PHYSICIAN ASSISTANT
Payer: COMMERCIAL

## 2024-03-12 DIAGNOSIS — R51.9 SEVERE HEADACHE: ICD-10-CM

## 2024-03-12 DIAGNOSIS — Z82.49 FAMILY HISTORY OF BRAIN ANEURYSM: ICD-10-CM

## 2024-03-12 DIAGNOSIS — R29.818 OTHER SYMPTOMS AND SIGNS INVOLVING THE NERVOUS SYSTEM: ICD-10-CM

## 2024-03-12 PROCEDURE — 70544 MR ANGIOGRAPHY HEAD W/O DYE: CPT | Performed by: RADIOLOGY

## 2024-03-13 DIAGNOSIS — R90.89 ABNORMAL MRA, BRAIN: Primary | ICD-10-CM

## 2024-03-13 DIAGNOSIS — R93.0 ABNORMAL FINDINGS ON DIAGNOSTIC IMAGING OF SKULL AND HEAD, NOT ELSEWHERE CLASSIFIED: ICD-10-CM

## 2024-03-13 NOTE — RESULT ENCOUNTER NOTE
Brandon Lloyd,     The great news is there is no aneurysm of the brain. 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 I have placed the order and they should call you. It seems unlikely this would be causing your headaches,but the additional imaging will help us determine this.   Shirley Warner PA-C

## 2024-03-27 ENCOUNTER — ANCILLARY PROCEDURE (OUTPATIENT)
Dept: MRI IMAGING | Facility: CLINIC | Age: 69
End: 2024-03-27
Attending: PHYSICIAN ASSISTANT
Payer: COMMERCIAL

## 2024-03-27 DIAGNOSIS — R93.0 ABNORMAL FINDINGS ON DIAGNOSTIC IMAGING OF SKULL AND HEAD, NOT ELSEWHERE CLASSIFIED: ICD-10-CM

## 2024-03-27 DIAGNOSIS — R90.89 ABNORMAL MRA, BRAIN: ICD-10-CM

## 2024-03-27 PROCEDURE — A9585 GADOBUTROL INJECTION: HCPCS | Performed by: RADIOLOGY

## 2024-03-27 PROCEDURE — 70548 MR ANGIOGRAPHY NECK W/DYE: CPT | Mod: TC | Performed by: RADIOLOGY

## 2024-03-27 RX ORDER — GADOBUTROL 604.72 MG/ML
10 INJECTION INTRAVENOUS ONCE
Status: COMPLETED | OUTPATIENT
Start: 2024-03-27 | End: 2024-03-27

## 2024-03-27 RX ADMIN — GADOBUTROL 10 ML: 604.72 INJECTION INTRAVENOUS at 19:48

## 2024-04-02 NOTE — RESULT ENCOUNTER NOTE
Prema     Sorada for the delay in response. I was out of clinic.   The MRA of the neck is normal, it appears the left vertebral artery is just smaller in size which is genetic and how you were born.   Shirley Warner PA-C

## 2024-04-10 ENCOUNTER — PATIENT OUTREACH (OUTPATIENT)
Dept: CARE COORDINATION | Facility: CLINIC | Age: 69
End: 2024-04-10
Payer: COMMERCIAL

## 2024-04-24 ENCOUNTER — PATIENT OUTREACH (OUTPATIENT)
Dept: CARE COORDINATION | Facility: CLINIC | Age: 69
End: 2024-04-24
Payer: COMMERCIAL

## 2024-05-12 DIAGNOSIS — K21.00 GASTROESOPHAGEAL REFLUX DISEASE WITH ESOPHAGITIS WITHOUT HEMORRHAGE: ICD-10-CM

## 2024-05-20 ENCOUNTER — OFFICE VISIT (OUTPATIENT)
Dept: FAMILY MEDICINE | Facility: CLINIC | Age: 69
End: 2024-05-20
Payer: COMMERCIAL

## 2024-05-20 VITALS
HEIGHT: 61 IN | SYSTOLIC BLOOD PRESSURE: 136 MMHG | RESPIRATION RATE: 20 BRPM | BODY MASS INDEX: 48.62 KG/M2 | DIASTOLIC BLOOD PRESSURE: 81 MMHG | TEMPERATURE: 98.6 F | OXYGEN SATURATION: 97 % | HEART RATE: 73 BPM | WEIGHT: 257.5 LBS

## 2024-05-20 DIAGNOSIS — K21.00 GASTROESOPHAGEAL REFLUX DISEASE WITH ESOPHAGITIS WITHOUT HEMORRHAGE: ICD-10-CM

## 2024-05-20 DIAGNOSIS — N18.30 STAGE 3 CHRONIC KIDNEY DISEASE, UNSPECIFIED WHETHER STAGE 3A OR 3B CKD (H): ICD-10-CM

## 2024-05-20 DIAGNOSIS — I10 ESSENTIAL HYPERTENSION: ICD-10-CM

## 2024-05-20 DIAGNOSIS — E78.5 HYPERLIPIDEMIA LDL GOAL <160: ICD-10-CM

## 2024-05-20 DIAGNOSIS — Z00.00 ENCOUNTER FOR MEDICARE ANNUAL WELLNESS EXAM: Primary | ICD-10-CM

## 2024-05-20 DIAGNOSIS — E66.01 MORBID OBESITY (H): ICD-10-CM

## 2024-05-20 LAB
ALBUMIN SERPL BCG-MCNC: 4 G/DL (ref 3.5–5.2)
ALP SERPL-CCNC: 133 U/L (ref 40–150)
ALT SERPL W P-5'-P-CCNC: 23 U/L (ref 0–50)
ANION GAP SERPL CALCULATED.3IONS-SCNC: 12 MMOL/L (ref 7–15)
AST SERPL W P-5'-P-CCNC: 24 U/L (ref 0–45)
BILIRUB SERPL-MCNC: 0.4 MG/DL
BUN SERPL-MCNC: 17.6 MG/DL (ref 8–23)
CALCIUM SERPL-MCNC: 9.1 MG/DL (ref 8.8–10.2)
CHLORIDE SERPL-SCNC: 107 MMOL/L (ref 98–107)
CHOLEST SERPL-MCNC: 167 MG/DL
CREAT SERPL-MCNC: 1.02 MG/DL (ref 0.51–0.95)
DEPRECATED HCO3 PLAS-SCNC: 22 MMOL/L (ref 22–29)
EGFRCR SERPLBLD CKD-EPI 2021: 59 ML/MIN/1.73M2
FASTING STATUS PATIENT QL REPORTED: ABNORMAL
FASTING STATUS PATIENT QL REPORTED: NORMAL
GLUCOSE SERPL-MCNC: 135 MG/DL (ref 70–99)
HDLC SERPL-MCNC: 69 MG/DL
LDLC SERPL CALC-MCNC: 80 MG/DL
NONHDLC SERPL-MCNC: 98 MG/DL
POTASSIUM SERPL-SCNC: 3.8 MMOL/L (ref 3.4–5.3)
PROT SERPL-MCNC: 7.1 G/DL (ref 6.4–8.3)
SODIUM SERPL-SCNC: 141 MMOL/L (ref 135–145)
TRIGL SERPL-MCNC: 91 MG/DL

## 2024-05-20 PROCEDURE — G0439 PPPS, SUBSEQ VISIT: HCPCS | Performed by: PHYSICIAN ASSISTANT

## 2024-05-20 PROCEDURE — 36415 COLL VENOUS BLD VENIPUNCTURE: CPT | Performed by: PHYSICIAN ASSISTANT

## 2024-05-20 PROCEDURE — 80053 COMPREHEN METABOLIC PANEL: CPT | Performed by: PHYSICIAN ASSISTANT

## 2024-05-20 PROCEDURE — 99214 OFFICE O/P EST MOD 30 MIN: CPT | Mod: 25 | Performed by: PHYSICIAN ASSISTANT

## 2024-05-20 PROCEDURE — 80061 LIPID PANEL: CPT | Performed by: PHYSICIAN ASSISTANT

## 2024-05-20 RX ORDER — RESPIRATORY SYNCYTIAL VIRUS VACCINE 120MCG/0.5
0.5 KIT INTRAMUSCULAR ONCE
Qty: 1 EACH | Refills: 0 | Status: CANCELLED | OUTPATIENT
Start: 2024-05-20 | End: 2024-05-20

## 2024-05-20 RX ORDER — OMEPRAZOLE 40 MG/1
40 CAPSULE, DELAYED RELEASE ORAL DAILY
Qty: 90 CAPSULE | Refills: 0 | OUTPATIENT
Start: 2024-05-20

## 2024-05-20 RX ORDER — ATORVASTATIN CALCIUM 10 MG/1
10 TABLET, FILM COATED ORAL DAILY
Qty: 90 TABLET | Refills: 3 | Status: SHIPPED | OUTPATIENT
Start: 2024-05-20

## 2024-05-20 RX ORDER — OMEPRAZOLE 40 MG/1
40 CAPSULE, DELAYED RELEASE ORAL DAILY
Qty: 90 CAPSULE | Refills: 3 | Status: SHIPPED | OUTPATIENT
Start: 2024-05-20

## 2024-05-20 RX ORDER — LISINOPRIL 40 MG/1
40 TABLET ORAL DAILY
Qty: 90 TABLET | Refills: 3 | Status: SHIPPED | OUTPATIENT
Start: 2024-05-20

## 2024-05-20 RX ORDER — AMLODIPINE BESYLATE 2.5 MG/1
2.5 TABLET ORAL DAILY
Qty: 90 TABLET | Refills: 3 | Status: SHIPPED | OUTPATIENT
Start: 2024-05-20

## 2024-05-20 SDOH — HEALTH STABILITY: PHYSICAL HEALTH: ON AVERAGE, HOW MANY MINUTES DO YOU ENGAGE IN EXERCISE AT THIS LEVEL?: 30 MIN

## 2024-05-20 SDOH — HEALTH STABILITY: PHYSICAL HEALTH: ON AVERAGE, HOW MANY DAYS PER WEEK DO YOU ENGAGE IN MODERATE TO STRENUOUS EXERCISE (LIKE A BRISK WALK)?: 1 DAY

## 2024-05-20 ASSESSMENT — SOCIAL DETERMINANTS OF HEALTH (SDOH): HOW OFTEN DO YOU GET TOGETHER WITH FRIENDS OR RELATIVES?: THREE TIMES A WEEK

## 2024-05-20 NOTE — PATIENT INSTRUCTIONS
"Preventive Care Advice   This is general advice we often give to help people stay healthy. Your care team may have specific advice just for you. Please talk to your care team about your own preventive care needs.  Lifestyle  Exercise at least 150 minutes each week (30 minutes a day, 5 days a week).  Do muscle strengthening activities 2 days a week. These help control your weight and prevent disease.  No smoking.  Wear sunscreen to prevent skin cancer.  Have your home tested for radon every 2 to 5 years. Radon is a colorless, odorless gas that can harm your lungs. To learn more, go to www.health.[facility].mn. and search for \"Radon in Homes.\"  Keep guns unloaded and locked up in a safe place like a safe or gun vault, or, use a gun lock and hide the keys. Always lock away bullets separately. To learn more, visit [employer].gov and search for \"safe gun storage.\"  Nutrition  Eat 5 or more servings of fruits and vegetables each day.  Try wheat bread, brown rice and whole grain pasta (instead of white bread, rice, and pasta).  Get enough calcium and vitamin D. Check the label on foods and aim for 100% of the RDA (recommended daily allowance).  Regular exams  Have a dental exam and cleaning every 6 months.  See your health care team every year to talk about:  Any changes in your health.  Any medicines your care team has prescribed.  Preventive care, family planning, and ways to prevent chronic diseases.  Shots (vaccines)   HPV shots (up to age 26), if you've never had them before.  Hepatitis B shots (up to age 59), if you've never had them before.  COVID-19 shot: Get this shot when it's due.  Flu shot: Get a flu shot every year.  Tetanus shot: Get a tetanus shot every 10 years.  Pneumococcal, hepatitis A, and RSV shots: Ask your care team if you need these based on your risk.  Shingles shot (for age 50 and up).  General health tests  Diabetes screening:  Starting at age 35, Get screened for diabetes at least every 3 years.  If " you are younger than age 35, ask your care team if you should be screened for diabetes.  Cholesterol test: At age 39, start having a cholesterol test every 5 years, or more often if advised.  Bone density scan (DEXA): At age 50, ask your care team if you should have this scan for osteoporosis (brittle bones).  Hepatitis C: Get tested at least once in your life.  Abdominal aortic aneurysm screening: Talk to your doctor about having this screening if you:  Have ever smoked; and  Are biologically male; and  Are between the ages of 65 and 75.  STIs (sexually transmitted infections)  Before age 24: Ask your care team if you should be screened for STIs.  After age 24: Get screened for STIs if you're at risk. You are at risk for STIs (including HIV) if:  You are sexually active with more than one person.  You don't use condoms every time.  You or a partner was diagnosed with a sexually transmitted infection.  If you are at risk for HIV, ask about PrEP medicine to prevent HIV.  Get tested for HIV at least once in your life, whether you are at risk for HIV or not.  Cancer screening tests  Cervical cancer screening: If you have a cervix, begin getting regular cervical cancer screening tests at age 21. Most people who have regular screenings with normal results can stop after age 65. Talk about this with your provider.  Breast cancer scan (mammogram): If you've ever had breasts, begin having regular mammograms starting at age 40. This is a scan to check for breast cancer.  Colon cancer screening: It is important to start screening for colon cancer at age 45.  Have a colonoscopy test every 10 years (or more often if you're at risk) Or, ask your provider about stool tests like a FIT test every year or Cologuard test every 3 years.  To learn more about your testing options, visit: www.[employer]/231076.pdf.  For help making a decision, visit: ailin/bm95843.  Prostate cancer screening test: If you have a prostate and are age 55  to 69, ask your provider if you would benefit from a yearly prostate cancer screening test.  Lung cancer screening: If you are a current or former smoker age 50 to 80, ask your care team if ongoing lung cancer screenings are right for you.  For informational purposes only. Not to replace the advice of your health care provider. Copyright   2023 [employer]. All rights reserved. Clinically reviewed by the [facility]. imagine [id number] - REV 04/24.

## 2024-05-20 NOTE — RESULT ENCOUNTER NOTE
Your labs are all stable except for a high sugar level. If you were fasting please let me know as we should recheck this, but if you weren't fasting it isn't concerning.   Shirley Warner PA-C

## 2024-05-20 NOTE — PROGRESS NOTES
"Preventive Care Visit  [facility] [place] Warner PA-C, Family Medicine  May 20, 2024      Assessment & Plan     Encounter for Medicare annual wellness exam      Hyperlipidemia LDL goal <160  Had been stable recheck.   - atorvastatin (LIPITOR) 10 MG tablet; Take 1 tablet (10 mg) by mouth daily  - Comprehensive metabolic panel; Future  - Lipid panel reflex to direct LDL Non-fasting; Future    Stage 3 chronic kidney disease, unspecified whether stage 3a or 3b CKD (H)  Recheck labs,     Essential hypertension  Stable today. Continue same meds.   - amLODIPine (NORVASC) 2.5 MG tablet; Take 1 tablet (2.5 mg) by mouth daily  - lisinopril (ZESTRIL) 40 MG tablet; Take 1 tablet (40 mg) by mouth daily    Gastroesophageal reflux disease with esophagitis without hemorrhage  Stable and controlled on current meds.   - omeprazole (PRILOSEC) 40 MG DR capsule; Take 1 capsule (40 mg) by mouth daily    Morbid obesity (H)  Encouraged exercise, limited by arthritis.             BMI  Estimated body mass index is 49.46 kg/m  as calculated from the following:    Height as of this encounter: 1.537 m (5' 0.5\").    Weight as of this encounter: 116.8 kg (257 lb 8 oz).       Counseling  Appropriate preventive services were discussed with this patient, including applicable screening as appropriate for fall prevention, nutrition, physical activity, Tobacco-use cessation, weight loss and cognition.  Checklist reviewing preventive services available has been given to the patient.  Reviewed patient's diet, addressing concerns and/or questions.   She is at risk for lack of exercise and has been provided with information to increase physical activity for the benefit of her well-being.           Pawel Lloyd is a 69 year old, presenting for the following:  Wellness Visit and Health Maintenance        5/20/2024    10:10 AM   Additional Questions   Roomed by miguelina mai         Health Care Directive  Patient does not have a Health " Care Directive or Living Will: Patient states has Advance Directive and will bring in a copy to clinic.    HPI              5/20/2024   General Health   How would you rate your overall physical health? Good   Feel stress (tense, anxious, or unable to sleep) Not at all         5/20/2024   Nutrition   Diet: Regular (no restrictions)         5/20/2024   Exercise   Days per week of moderate/strenous exercise 1 day   Average minutes spent exercising at this level 30 min   (!) EXERCISE CONCERN      5/20/2024   Social Factors   Frequency of gathering with friends or relatives Three times a week   Worry food won't last until get money to buy more No   Food not last or not have enough money for food? No   Do you have housing?  Yes   Are you worried about losing your housing? No   Lack of transportation? No   Unable to get utilities (heat,electricity)? No         5/20/2024   Fall Risk   Fallen 2 or more times in the past year? No    No   Trouble with walking or balance? No    No          5/20/2024   Activities of Daily Living- Home Safety   Needs help with the following daily activites None of the above   Safety concerns in the home None of the above         5/20/2024   Dental   Dentist two times every year? Yes         5/20/2024   Hearing Screening   Hearing concerns? None of the above         5/20/2024   Driving Risk Screening   Patient/family members have concerns about driving No         5/20/2024   General Alertness/Fatigue Screening   Have you been more tired than usual lately? No         5/20/2024   Urinary Incontinence Screening   Bothered by leaking urine in past 6 months No         5/20/2024   TB Screening   Were you born outside of the [place]? No         Today's PHQ-2 Score:       5/20/2024    10:14 AM   PHQ-2 ( 1999 Pfizer)   Q1: Little interest or pleasure in doing things 0   Q2: Feeling down, depressed or hopeless 0   PHQ-2 Score 0   Q1: Little interest or pleasure in doing things Not at all   Q2: Feeling down,  depressed or hopeless Not at all   PHQ-2 Score 0           5/20/2024   Substance Use   Alcohol more than 3/day or more than 7/wk No   Do you have a current opioid prescription? No   How severe/bad is pain from 1 to 10? 0/10 (No Pain)   Do you use any other substances recreationally? No     Social History     Tobacco Use    Smoking status: Former     Current packs/day: 0.00     Types: Cigarettes    Smokeless tobacco: Never   Vaping Use    Vaping status: Never Used   Substance Use Topics    Alcohol use: Yes     Comment: 5 times per year    Drug use: No           5/10/2023   LAST FHS-7 RESULTS   1st degree relative breast or ovarian cancer No   Any relative bilateral breast cancer No   Any male have breast cancer No   Any ONE woman have BOTH breast AND ovarian cancer No   Any woman with breast cancer before 50yrs No   2 or more relatives with breast AND/OR ovarian cancer No   2 or more relatives with breast AND/OR bowel cancer No        Mammogram Screening - Mammogram every 1-2 years updated in Health Maintenance based on mutual decision making      History of abnormal Pap smear: No - age 65 or older with adequate negative prior screening test results (3 consecutive negative cytology results, 2 consecutive negative cotesting results, or 2 consecutive negative HrHPV test results within 10 years, with the most recent test occurring within the recommended screening interval for the test used)        10/1/2014    12:00 AM 2/19/2010    12:00 AM 6/14/2006    12:00 AM   PAP / HPV   PAP (Historical) NIL  NIL  NIL      ASCVD Risk   The 10-year ASCVD risk score (Chi GTZ, et al., 2019) is: 13.2%    Values used to calculate the score:      Age: 69 years      Sex: Female      Is Non- : No      Diabetic: No      Tobacco smoker: No      Systolic Blood Pressure: 136 mmHg      Is BP treated: Yes      HDL Cholesterol: 74 mg/dL      Total Cholesterol: 191 mg/dL            Reviewed and updated as needed  "this visit by Provider   Tobacco  Allergies  Meds  Problems  Med Hx  Surg Hx  Fam Hx              Current providers sharing in care for this patient include:  Patient Care Team:  Shirley Warner PA-C as PCP - General (Family Medicine)  Shirley Warner PA-C as Assigned PCP    The following health maintenance items are reviewed in Epic and correct as of today:  Health Maintenance   Topic Date Due    RSV VACCINE (Pregnancy & 60+) (1 - 1-dose 60+ series) Never done    LIPID  05/10/2024    ANNUAL REVIEW OF HM ORDERS  05/10/2024    COVID-19 Vaccine (6 - 2023-24 season) 05/19/2024    MEDICARE ANNUAL WELLNESS VISIT  05/10/2024    HEMOGLOBIN  05/15/2024    BMP  07/28/2024    MICROALBUMIN  10/27/2024    MAMMO SCREENING  05/10/2025    FALL RISK ASSESSMENT  05/20/2025    DTAP/TDAP/TD IMMUNIZATION (2 - Td or Tdap) 07/29/2025    GLUCOSE  07/28/2026    ADVANCE CARE PLANNING  05/10/2028    DEXA  10/27/2028    COLORECTAL CANCER SCREENING  11/15/2029    HEPATITIS C SCREENING  Completed    PHQ-2 (once per calendar year)  Completed    INFLUENZA VACCINE  Completed    Pneumococcal Vaccine: 65+ Years  Completed    URINALYSIS  Completed    ZOSTER IMMUNIZATION  Completed    IPV IMMUNIZATION  Aged Out    HPV IMMUNIZATION  Aged Out    MENINGITIS IMMUNIZATION  Aged Out    RSV MONOCLONAL ANTIBODY  Aged Out    LUNG CANCER SCREENING  Discontinued            Objective    Exam  /81 (BP Location: Left arm, Patient Position: Chair, Cuff Size: Adult Large)   Pulse 73   Temp 98.6  F (37  C) (Oral)   Resp 20   Ht 1.537 m (5' 0.5\")   Wt 116.8 kg (257 lb 8 oz)   LMP  (LMP Unknown)   SpO2 97%   BMI 49.46 kg/m     Estimated body mass index is 49.46 kg/m  as calculated from the following:    Height as of this encounter: 1.537 m (5' 0.5\").    Weight as of this encounter: 116.8 kg (257 lb 8 oz).    Physical Exam          5/20/2024   Mini Cog   Clock Draw Score 2 Normal   3 Item Recall 3 objects recalled   Mini Cog Total Score 5          "     Signed Electronically by: Shirley Warner PA-C

## 2024-07-01 ENCOUNTER — OFFICE VISIT (OUTPATIENT)
Dept: FAMILY MEDICINE | Facility: CLINIC | Age: 69
End: 2024-07-01
Payer: COMMERCIAL

## 2024-07-01 VITALS
BODY MASS INDEX: 48.03 KG/M2 | WEIGHT: 254.4 LBS | OXYGEN SATURATION: 98 % | HEIGHT: 61 IN | RESPIRATION RATE: 16 BRPM | HEART RATE: 60 BPM | TEMPERATURE: 97.6 F | DIASTOLIC BLOOD PRESSURE: 83 MMHG | SYSTOLIC BLOOD PRESSURE: 124 MMHG

## 2024-07-01 DIAGNOSIS — Z01.818 PREOP GENERAL PHYSICAL EXAM: Primary | ICD-10-CM

## 2024-07-01 DIAGNOSIS — L98.9 SKIN LESION OF CHEEK: ICD-10-CM

## 2024-07-01 DIAGNOSIS — H25.9 AGE-RELATED CATARACT OF BOTH EYES, UNSPECIFIED AGE-RELATED CATARACT TYPE: ICD-10-CM

## 2024-07-01 PROCEDURE — 99214 OFFICE O/P EST MOD 30 MIN: CPT | Performed by: PHYSICIAN ASSISTANT

## 2024-07-01 RX ORDER — RESPIRATORY SYNCYTIAL VIRUS VACCINE 120MCG/0.5
0.5 KIT INTRAMUSCULAR ONCE
Qty: 1 EACH | Refills: 0 | Status: CANCELLED | OUTPATIENT
Start: 2024-07-01 | End: 2024-07-01

## 2024-07-01 NOTE — PATIENT INSTRUCTIONS
Patient Education   Preparing for Your Surgery  Getting started  A nurse will call you to review your health history and instructions. They will give you an arrival time based on your scheduled surgery time. Please be ready to share:  Your doctor's clinic name and phone number  Your medical, surgical, and anesthesia history  A list of allergies and sensitivities  A list of medicines, including herbal treatments and over-the-counter drugs  Whether the patient has a legal guardian (ask how to send us the papers in advance)  Please tell us if you're pregnant--or if there's any chance you might be pregnant. Some surgeries may injure a fetus (unborn baby), so they require a pregnancy test. Surgeries that are safe for a fetus don't always need a test, and you can choose whether to have one.   If you have a child who's having surgery, please ask for a copy of Preparing for Your Child's Surgery.    Preparing for surgery  Within 10 to 30 days of surgery: Have a pre-op exam (sometimes called an H&P, or History and Physical). This can be done at a clinic or pre-operative center.  If you're having a , you may not need this exam. Talk to your care team.  At your pre-op exam, talk to your care team about all medicines you take. If you need to stop any medicines before surgery, ask when to start taking them again.  We do this for your safety. Many medicines can make you bleed too much during surgery. Some change how well surgery (anesthesia) drugs work.  Call your insurance company to let them know you're having surgery. (If you don't have insurance, call [phone].)  Call your clinic if there's any change in your health. This includes signs of a cold or flu (sore throat, runny nose, cough, rash, fever). It also includes a scrape or scratch near the surgery site.  If you have questions on the day of surgery, call your hospital or surgery center.  Eating and drinking guidelines  For your safety: Unless your surgeon  tells you otherwise, follow the guidelines below.  Eat and drink as usual until 8 hours before you arrive for surgery. After that, no food or milk.  Drink clear liquids until 2 hours before you arrive. These are liquids you can see through, like water, Gatorade, and Propel Water. They also include plain black coffee and tea (no cream or milk), candy, and breath mints. You can spit out gum when you arrive.  If you drink alcohol: Stop drinking it the night before surgery.  If your care team tells you to take medicine on the morning of surgery, it's okay to take it with a sip of water.  Preventing infection  Shower or bathe the night before and morning of your surgery. Follow the instructions your clinic gave you. (If no instructions, use regular soap.)  Don't shave or clip hair near your surgery site. We'll remove the hair if needed.  Don't smoke or vape the morning of surgery. You may chew nicotine gum up to 2 hours before surgery. A nicotine patch is okay.  Note: Some surgeries require you to completely quit smoking and nicotine. Check with your surgeon.  Your care team will make every effort to keep you safe from infection. We will:  Clean our hands often with soap and water (or an alcohol-based hand rub).  Clean the skin at your surgery site with a special soap that kills germs.  Give you a special gown to keep you warm. (Cold raises the risk of infection.)  Wear special hair covers, masks, gowns and gloves during surgery.  Give antibiotic medicine, if prescribed. Not all surgeries need antibiotics.  What to bring on the day of surgery  Photo ID and insurance card  Copy of your health care directive, if you have one  Glasses and hearing aids (bring cases)  You can't wear contacts during surgery  Inhaler and eye drops, if you use them (tell us about these when you arrive)  CPAP machine or breathing device, if you use them  A few personal items, if spending the night  If you have . . .  A pacemaker, ICD (cardiac  defibrillator) or other implant: Bring the ID card.  An implanted stimulator: Bring the remote control.  A legal guardian: Bring a copy of the certified (court-stamped) guardianship papers.  Please remove any jewelry, including body piercings. Leave jewelry and other valuables at home.  If you're going home the day of surgery  You must have a responsible adult drive you home. They should stay with you overnight as well.  If you don't have someone to stay with you, and you aren't safe to go home alone, we may keep you overnight. Insurance often won't pay for this.  After surgery  If it's hard to control your pain or you need more pain medicine, please call your surgeon's office.  Questions?   If you have any questions for your care team, list them here: _________________________________________________________________________________________________________________________________________________________________________ ____________________________________ ____________________________________ ____________________________________  For informational purposes only. Not to replace the advice of your health care provider. Copyright   2003, 2019 [facility]. All rights reserved. Clinically reviewed by Denisse Pizarro MD. [employer] 080203 - REV 12/22.

## 2024-07-01 NOTE — PROGRESS NOTES
Preoperative Evaluation  [facility]  [address]  [place] [address]  Phone: [phone]  Primary Provider: Shirley Warner PA-C  Pre-op Performing Provider: Lida Blanco PA-C  Jul 1, 2024 6/27/2024   Surgical Information   What procedure is being done? Cataracts : Right Eye: 07/08/2024, Left Eye: 07/22/2024   Facility or Hospital where procedure/surgery will be performed: [facility]    Who is doing the procedure / surgery? Dr. Najma Lennon   Date of surgery / procedure: Right Eye: 07/08/2024, Left Eye: 07/22/2024   Time of surgery / procedure: Right Eye: 11:00 am , Left Eye: 8:30am   Where do you plan to recover after surgery? at home with family        Fax number for surgical facility: [phone] , [phone]    Assessment & Plan     The proposed surgical procedure is considered LOW risk.    Preop general physical exam  Age-related cataract of both eyes, unspecified age-related cataract type      Skin lesion of cheek  Referral placed. Seeing Shilpi.   - Adult Dermatology  Referral; Future            - No identified additional risk factors other than previously addressed    Antiplatelet or Anticoagulation Medication Instructions   - Patient is on no antiplatelet or anticoagulation medications.    Additional Medication Instructions  Take all scheduled medications on the day of surgery    Recommendation  Approval given to proceed with proposed procedure, without further diagnostic evaluation.    Pawel   Prema is a 69 year old, presenting for the following:  Pre-Op Exam          7/1/2024     8:43 AM   Additional Questions   Roomed by An V.         7/1/2024     8:43 AM   Patient Reported Additional Medications   Patient reports taking the following new medications none     HPI related to upcoming procedure: Vision changes related to cataract. Will undergo removal.         6/27/2024   Pre-Op Questionnaire   Have you ever had a  heart attack or stroke? No   Have you ever had surgery on your heart or blood vessels, such as a stent placement, a coronary artery bypass, or surgery on an artery in your head, neck, heart, or legs? No   Do you have chest pain with activity? No   Do you have a history of heart failure? No   Do you currently have a cold, bronchitis or symptoms of other infection? No   Do you have a cough, shortness of breath, or wheezing? No   Do you or anyone in your family have previous history of blood clots? No   Do you or does anyone in your family have a serious bleeding problem such as prolonged bleeding following surgeries or cuts? No   Have you ever had problems with anemia or been told to take iron pills? No   Have you had any abnormal blood loss such as black, tarry or bloody stools, or abnormal vaginal bleeding? No   Have you ever had a blood transfusion? No   Are you willing to have a blood transfusion if it is medically needed before, during, or after your surgery? Yes   Have you or any of your relatives ever had problems with anesthesia? No   Do you have sleep apnea, excessive snoring or daytime drowsiness? No   Do you have any artifical heart valves or other implanted medical devices like a pacemaker, defibrillator, or continuous glucose monitor? No   Do you have artificial joints? No   Are you allergic to latex? No        Health Care Directive  Patient does not have a Health Care Directive or Living Will: Discussed advance care planning with patient; information given to patient to review.    Preoperative Review of    reviewed - controlled substances reflected in medication list.          Patient Active Problem List    Diagnosis Date Noted    Vitamin D deficiency 08/04/2023     Priority: Medium    CKD (chronic kidney disease) stage 3, GFR 30-59 ml/min (H) 08/04/2023     Priority: Medium    Essential hypertension 11/13/2020     Priority: Medium    Morbid obesity (H) 10/11/2019     Priority: Medium     HYPERLIPIDEMIA LDL GOAL <160 05/09/2010     Priority: Medium    OA (Osteoarthritis) - left great toe 02/19/2010     Priority: Medium    Asymptomatic postmenopausal status      Priority: Medium     Problem list name updated by automated process. Provider to review        Past Medical History:   Diagnosis Date    Arthritis     CKD (chronic kidney disease) stage 3, GFR 30-59 ml/min (H)     Essential hypertension 11/13/2020    Vitamin D deficiency 08/04/2023     Past Surgical History:   Procedure Laterality Date    COLONOSCOPY      COLONOSCOPY N/A 11/15/2019    Procedure: COLONOSCOPY, WITH POLYPECTOMY AND BIOPSY;  Surgeon: Jacob Nieves MD;  Location: [facility] OR    ESOPHAGOSCOPY, GASTROSCOPY, DUODENOSCOPY (EGD), COMBINED N/A 11/20/2023    Procedure: ESOPHAGOGASTRODUODENOSCOPY, WITH BIOPSY;  Surgeon: Forrest Villanueva MD;  Location: [facility] OR    INCISION AND DRAINAGE FINGER, COMBINED Left 7/9/2019    Procedure: Incision and Drainage of Left Index Finger;  Surgeon: Lowell Whelan MD;  Location: [facility] OR    NO HISTORY OF SURGERY       Current Outpatient Medications   Medication Sig Dispense Refill    amLODIPine (NORVASC) 2.5 MG tablet Take 1 tablet (2.5 mg) by mouth daily 90 tablet 3    atorvastatin (LIPITOR) 10 MG tablet Take 1 tablet (10 mg) by mouth daily 90 tablet 3    diphenhydrAMINE (BENADRYL) 25 MG tablet Take 25-50 mg by mouth daily as needed for allergies      lisinopril (ZESTRIL) 40 MG tablet Take 1 tablet (40 mg) by mouth daily 90 tablet 3    omeprazole (PRILOSEC) 40 MG DR capsule Take 1 capsule (40 mg) by mouth daily 90 capsule 3    vitamin D3 (CHOLECALCIFEROL) 50 mcg (2000 units) tablet Take 1 tablet (50 mcg) by mouth daily      alum & mag hydroxide-simethicone (MYLANTA/MAALOX) 200-200-20 MG/5ML SUSP suspension Take 30 mLs by mouth once as needed for indigestion or heartburn (uses 2-3 times per week) (Patient not taking: Reported on 7/1/2024)         Allergies   Allergen Reactions    Amoxicillin  "Hives        Social History     Tobacco Use    Smoking status: Former     Current packs/day: 0.00     Types: Cigarettes    Smokeless tobacco: Never   Substance Use Topics    Alcohol use: Yes     Comment: 5 times per year       History   Drug Use No             Review of Systems  Constitutional, HEENT, cardiovascular, pulmonary, GI, , musculoskeletal, neuro, skin, endocrine and psych systems are negative, except as otherwise noted.    Objective    /83   Pulse 60   Temp 97.6  F (36.4  C) (Oral)   Resp 16   Ht 1.537 m (5' 0.51\")   Wt 115.4 kg (254 lb 6.4 oz)   LMP  (LMP Unknown)   SpO2 98%   BMI 48.85 kg/m     Estimated body mass index is 48.85 kg/m  as calculated from the following:    Height as of this encounter: 1.537 m (5' 0.51\").    Weight as of this encounter: 115.4 kg (254 lb 6.4 oz).  Physical Exam  GENERAL: alert and no distress  EYES: Eyes grossly normal to inspection, PERRL and conjunctivae and sclerae normal  HENT: ear canals and TM's normal, nose and mouth without ulcers or lesions  NECK: no adenopathy, no asymmetry, masses, or scars  RESP: lungs clear to auscultation - no rales, rhonchi or wheezes  CV: regular rate and rhythm, normal S1 S2, no S3 or S4, no murmur, click or rub, no peripheral edema  ABDOMEN: soft, nontender, no hepatosplenomegaly, no masses and bowel sounds normal  MS: no gross musculoskeletal defects noted, no edema  SKIN: no suspicious lesions or rashes  NEURO: Normal strength and tone, mentation intact and speech normal  PSYCH: mentation appears normal, affect normal/bright    Recent Labs   Lab Test 05/20/24  1050 07/28/23  0808    142   POTASSIUM 3.8 4.3   CR 1.02* 1.14*        Diagnostics  No labs were ordered during this visit.   No EKG required for low risk surgery (cataract, skin procedure, breast biopsy, etc).    Revised Cardiac Risk Index (RCRI)  The patient has the following serious cardiovascular risks for perioperative complications:   - No serious " cardiac risks = 0 points     RCRI Interpretation: 0 points: Class I (very low risk - 0.4% complication rate)         Signed Electronically by: Lida Blanco PA-C  Copy of this evaluation report is provided to requesting physician.

## 2024-09-19 ENCOUNTER — HOSPITAL ENCOUNTER (EMERGENCY)
Facility: CLINIC | Age: 69
Discharge: HOME | End: 2024-09-20
Attending: EMERGENCY MEDICINE | Admitting: EMERGENCY MEDICINE
Payer: COMMERCIAL

## 2024-09-19 DIAGNOSIS — M25.552 HIP PAIN, LEFT: ICD-10-CM

## 2024-09-19 DIAGNOSIS — M48.061 SPINAL STENOSIS OF LUMBAR REGION WITHOUT NEUROGENIC CLAUDICATION: ICD-10-CM

## 2024-09-19 PROCEDURE — 99284 EMERGENCY DEPT VISIT MOD MDM: CPT | Performed by: EMERGENCY MEDICINE

## 2024-09-19 PROCEDURE — 250N000013 HC RX MED GY IP 250 OP 250 PS 637: Performed by: EMERGENCY MEDICINE

## 2024-09-19 PROCEDURE — 99284 EMERGENCY DEPT VISIT MOD MDM: CPT | Mod: 25 | Performed by: EMERGENCY MEDICINE

## 2024-09-19 RX ORDER — OXYCODONE HYDROCHLORIDE 5 MG/1
5 TABLET ORAL ONCE
Status: COMPLETED | OUTPATIENT
Start: 2024-09-19 | End: 2024-09-19

## 2024-09-19 RX ADMIN — OXYCODONE HYDROCHLORIDE 5 MG: 5 TABLET ORAL at 23:38

## 2024-09-19 ASSESSMENT — COLUMBIA-SUICIDE SEVERITY RATING SCALE - C-SSRS
2. HAVE YOU ACTUALLY HAD ANY THOUGHTS OF KILLING YOURSELF IN THE PAST MONTH?: NO
1. IN THE PAST MONTH, HAVE YOU WISHED YOU WERE DEAD OR WISHED YOU COULD GO TO SLEEP AND NOT WAKE UP?: NO
6. HAVE YOU EVER DONE ANYTHING, STARTED TO DO ANYTHING, OR PREPARED TO DO ANYTHING TO END YOUR LIFE?: NO

## 2024-09-20 ENCOUNTER — APPOINTMENT (OUTPATIENT)
Dept: MRI IMAGING | Facility: CLINIC | Age: 69
End: 2024-09-20
Attending: EMERGENCY MEDICINE
Payer: COMMERCIAL

## 2024-09-20 ENCOUNTER — APPOINTMENT (OUTPATIENT)
Dept: CT IMAGING | Facility: CLINIC | Age: 69
End: 2024-09-20
Attending: EMERGENCY MEDICINE
Payer: COMMERCIAL

## 2024-09-20 VITALS
RESPIRATION RATE: 16 BRPM | SYSTOLIC BLOOD PRESSURE: 148 MMHG | OXYGEN SATURATION: 98 % | DIASTOLIC BLOOD PRESSURE: 84 MMHG | HEART RATE: 64 BPM | TEMPERATURE: 98.4 F

## 2024-09-20 PROCEDURE — 72148 MRI LUMBAR SPINE W/O DYE: CPT

## 2024-09-20 PROCEDURE — 73721 MRI JNT OF LWR EXTRE W/O DYE: CPT | Mod: LT

## 2024-09-20 PROCEDURE — 73700 CT LOWER EXTREMITY W/O DYE: CPT | Mod: LT

## 2024-09-20 PROCEDURE — 73721 MRI JNT OF LWR EXTRE W/O DYE: CPT | Mod: 26 | Performed by: RADIOLOGY

## 2024-09-20 PROCEDURE — 72148 MRI LUMBAR SPINE W/O DYE: CPT | Mod: 26 | Performed by: RADIOLOGY

## 2024-09-20 PROCEDURE — 250N000013 HC RX MED GY IP 250 OP 250 PS 637: Performed by: EMERGENCY MEDICINE

## 2024-09-20 PROCEDURE — 72192 CT PELVIS W/O DYE: CPT | Mod: 26 | Performed by: RADIOLOGY

## 2024-09-20 PROCEDURE — 72192 CT PELVIS W/O DYE: CPT

## 2024-09-20 PROCEDURE — 73700 CT LOWER EXTREMITY W/O DYE: CPT | Mod: 26 | Performed by: RADIOLOGY

## 2024-09-20 RX ORDER — OXYCODONE HYDROCHLORIDE 5 MG/1
2.5-5 TABLET ORAL EVERY 6 HOURS PRN
Qty: 12 TABLET | Refills: 0 | Status: SHIPPED | OUTPATIENT
Start: 2024-09-20 | End: 2024-09-23

## 2024-09-20 RX ORDER — OXYCODONE HYDROCHLORIDE 5 MG/1
5 TABLET ORAL ONCE
Status: COMPLETED | OUTPATIENT
Start: 2024-09-20 | End: 2024-09-20

## 2024-09-20 RX ORDER — LORAZEPAM 1 MG/1
1 TABLET ORAL ONCE
Status: COMPLETED | OUTPATIENT
Start: 2024-09-20 | End: 2024-09-20

## 2024-09-20 RX ORDER — DOCUSATE SODIUM 100 MG/1
100 CAPSULE, LIQUID FILLED ORAL 2 TIMES DAILY
Qty: 30 CAPSULE | Refills: 0 | Status: SHIPPED | OUTPATIENT
Start: 2024-09-20

## 2024-09-20 RX ADMIN — OXYCODONE HYDROCHLORIDE 5 MG: 5 TABLET ORAL at 05:36

## 2024-09-20 RX ADMIN — LORAZEPAM 1 MG: 1 TABLET ORAL at 02:58

## 2024-09-20 ASSESSMENT — ACTIVITIES OF DAILY LIVING (ADL)
ADLS_ACUITY_SCORE: 35

## 2024-09-20 NOTE — ED PROVIDER NOTES
ED Provider Note  [facility]      History     Chief Complaint   Patient presents with    Hip Pain     HPI    Theodora Hamilton is a 69 year old female with a hx of arthritis, CKD, htn,  who presents to the ED today with c/o L sided hip pain x 3 weeks. She has been noticing significantly increasing pain over the past 2 days. She has been limping and walking with extreme difficulty over the past 2 days. She was in urgent care this morning for this hip pain and she was told that the etiology of her pain was due to her back after doing lumbar spine XR. She was given an injection of toradol and does not really think that was at all helpful. She was advised to take cyclobenzaprine as well as NSAIDs and tylenol and was referred to PT. Patient reports that now she is having pain in the L groin as well as the low back. Denies radiation of pain down the back of the leg. Denies any trauma or falls. Denies tingling or numbness except where she had the toradol injection in the L thigh earlier today (anterior L thigh) and she reports that she has diminished sensation in that area since the injection. At home she has taken motrin 600 mg, lidocaine patch on the back as well as heat and ice and 2 doses of cyclobenzaprine. She is very uncomfortable and is having difficulty walking around. She does not use any assistive devices at baseline. Denies any difficulty controlling bowel or bladder. Denies any previous back surgeries.     No fevers or chills. No rhinorrhea or sore throat. No cough or SOB, no chest pain. No abdominal pain, no n/v/d.     Lives with partner Angela in her own home.     Lumbar spine XR done today through [facility] and read available in Care Everywhere.    Past Medical History:   Diagnosis Date    Arthritis     CKD (chronic kidney disease) stage 3, GFR 30-59 ml/min (H)     Essential hypertension 11/13/2020    Vitamin D deficiency 08/04/2023       Past Surgical History:   Procedure Laterality  Date    COLONOSCOPY      COLONOSCOPY N/A 11/15/2019    Procedure: COLONOSCOPY, WITH POLYPECTOMY AND BIOPSY;  Surgeon: Jacob Nieves MD;  [facility]: [facility]    ESOPHAGOSCOPY, GASTROSCOPY, DUODENOSCOPY (EGD), COMBINED N/A 11/20/2023    Procedure: ESOPHAGOGASTRODUODENOSCOPY, WITH BIOPSY;  Surgeon: Forrest Villanueva MD;  Location: [facility] OR    INCISION AND DRAINAGE FINGER, COMBINED Left 7/9/2019    Procedure: Incision and Drainage of Left Index Finger;  Surgeon: Lowell Whelan MD;  Location: [facility] OR    NO HISTORY OF SURGERY         Family History   Problem Relation Age of Onset    C.A.D. Maternal Grandmother     C.A.D. Maternal Grandfather         17 MI's per history    C.A.D. Paternal Grandmother     Diabetes Paternal Grandmother     C.A.D. Paternal Grandfather     Diabetes Paternal Grandfather     Hypertension Mother     C.A.D. Mother         pacemaker    Circulatory Father         Carotid artery stenosis    Respiratory Father 75        COPD    Coronary Artery Disease Father     Substance Abuse Father     Diabetes Paternal Aunt     Diabetes Paternal Uncle     Coronary Artery Disease Brother     Breast Cancer Cousin     Asthma No family hx of     Cerebrovascular Disease No family hx of     Cancer - colorectal No family hx of     Prostate Cancer No family hx of        Social History     Tobacco Use    Smoking status: Former     Current packs/day: 0.00     Types: Cigarettes    Smokeless tobacco: Never   Substance Use Topics    Alcohol use: Yes     Comment: 5 times per year         Past Medical History  Past Medical History:   Diagnosis Date    Arthritis     CKD (chronic kidney disease) stage 3, GFR 30-59 ml/min (H)     Essential hypertension 11/13/2020    Vitamin D deficiency 08/04/2023     Past Surgical History:   Procedure Laterality Date    COLONOSCOPY      COLONOSCOPY N/A 11/15/2019    Procedure: COLONOSCOPY, WITH POLYPECTOMY AND BIOPSY;  Surgeon: Jacob Nieves MD;  Location:  OR     ESOPHAGOSCOPY, GASTROSCOPY, DUODENOSCOPY (EGD), COMBINED N/A 11/20/2023    Procedure: ESOPHAGOGASTRODUODENOSCOPY, WITH BIOPSY;  Surgeon: Forrest Villanueva MD;  Location: [facility] OR    INCISION AND DRAINAGE FINGER, COMBINED Left 7/9/2019    Procedure: Incision and Drainage of Left Index Finger;  Surgeon: Lowell Whelan MD;  Location: [facility] OR    NO HISTORY OF SURGERY       alum & mag hydroxide-simethicone (MYLANTA/MAALOX) 200-200-20 MG/5ML SUSP suspension  amLODIPine (NORVASC) 2.5 MG tablet  atorvastatin (LIPITOR) 10 MG tablet  diphenhydrAMINE (BENADRYL) 25 MG tablet  lisinopril (ZESTRIL) 40 MG tablet  omeprazole (PRILOSEC) 40 MG DR capsule  vitamin D3 (CHOLECALCIFEROL) 50 mcg (2000 units) tablet      Allergies   Allergen Reactions    Amoxicillin Hives     Family History  Family History   Problem Relation Age of Onset    C.A.D. Maternal Grandmother     C.A.D. Maternal Grandfather         17 MI's per history    C.A.D. Paternal Grandmother     Diabetes Paternal Grandmother     C.A.D. Paternal Grandfather     Diabetes Paternal Grandfather     Hypertension Mother     C.A.D. Mother         pacemaker    Circulatory Father         Carotid artery stenosis    Respiratory Father 75        COPD    Coronary Artery Disease Father     Substance Abuse Father     Diabetes Paternal Aunt     Diabetes Paternal Uncle     Coronary Artery Disease Brother     Breast Cancer Cousin     Asthma No family hx of     Cerebrovascular Disease No family hx of     Cancer - colorectal No family hx of     Prostate Cancer No family hx of      Social History   Social History     Tobacco Use    Smoking status: Former     Current packs/day: 0.00     Types: Cigarettes    Smokeless tobacco: Never   Vaping Use    Vaping status: Never Used   Substance Use Topics    Alcohol use: Yes     Comment: 5 times per year    Drug use: No      A medically appropriate review of systems was performed with pertinent positives and negatives noted in the HPI, and all  other systems negative.    Physical Exam   BP: (!) 179/100  Pulse: 64  Temp: 98.4  F (36.9  C)  Resp: 16  SpO2: 98 %  Physical Exam  Vitals and nursing note reviewed.   Constitutional:       General: She is in acute distress.      Appearance: She is not diaphoretic.      Comments: Elderly female, sitting in wheelchair, appears extremely uncomfortable   HENT:      Head: Atraumatic.      Mouth/Throat:      Mouth: Mucous membranes are moist.      Pharynx: Oropharynx is clear. No oropharyngeal exudate.   Eyes:      General: No scleral icterus.     Pupils: Pupils are equal, round, and reactive to light.   Cardiovascular:      Rate and Rhythm: Normal rate.      Pulses: Normal pulses.      Heart sounds: Normal heart sounds. No murmur heard.  Pulmonary:      Effort: No respiratory distress.      Breath sounds: Normal breath sounds.   Abdominal:      General: Bowel sounds are normal.      Palpations: Abdomen is soft.      Tenderness: There is no abdominal tenderness.      Comments: Obese, soft, nontender to palaption   Musculoskeletal:         General: Tenderness present.      Comments: Pain with passive internal and external rotation of left hip    Plantarflexion and dorsiflexion is intact, sensation intact to light touch.  Limited hip flexion on the left.  Knee flexion and knee extension appears intact.  Sensation intact to light touch.    Mild tenderness to palpation in the low lumbar spine and over sciatic notch   Skin:     General: Skin is warm.      Findings: No rash.   Neurological:      Mental Status: She is alert.      Comments: Antalgic gait  Weakness of hip flexion on left  Normal sensation throughout both lower extremities         ED Course, Procedures, & Data      Procedures                 No results found for any visits on 09/19/24.  Medications   oxyCODONE (ROXICODONE) tablet 5 mg (5 mg Oral $Given 9/19/24 7754)     Labs Ordered and Resulted from Time of ED Arrival to Time of ED Departure - No data to  display  CT Pelvis Bone wo Contrast    (Results Pending)   CT Hip Left w/o Contrast    (Results Pending)          Critical care was not performed.     Medical Decision Making  The patient's presentation was of high complexity (an acute health issue posing potential threat to life or bodily function).    The patient's evaluation involved:  review of external note(s) from 1 sources (see separate area of note for details)  review of 1 test result(s) ordered prior to this encounter (see separate area of note for details)  ordering and/or review of 2 test(s) in this encounter (see separate area of note for details)  discussion of management or test interpretation with another health professional (see separate area of note for details)    The patient's management necessitated further care after sign-out to Dr. Salcido (see their note for further management).    Assessment & Plan    Patient presents to the emergency department today for the above complaints.  Reviewed urgent care visit from earlier today.  Reviewed lumbar spine x-ray from earlier today which showed bilateral hip arthritis.  There was also grade 1 anterolisthesis of L4-L5.     Differential diagnosis could include occult fracture of either pelvis/acetabulum or hip.  We first started with pelvic bone x-ray and left hip x-ray which was read by radiology as no acute fracture.  There is mild degenerative changes of both hips.    Patient was given 1 dose of oxycodone here in the emergency department and this has been fairly helpful for her for pain.  Given the fact that the evaluation of bony structures is relatively unrevealing I believe we should probably proceed with lumbar spine MRI to evaluate for nerve root impingement or disc herniation.  I did discuss this with the patient and she requested some anxiolysis.  I have ordered Ativan 1 mg orally for her prior to MRI.  I have discussed this with the radiology resident. We will also do L hip MRI to completely  r/o hip fx in an elderly patient.    Patient will be signed out to overnight physician to follow up on MRI results.     I have reviewed the nursing notes. I have reviewed the findings, diagnosis, plan and need for follow up with the patient.    New Prescriptions    No medications on file       Final diagnoses:   None       Awilda Jackson MD  [facility] EMERGENCY DEPARTMENT  9/19/2024     Awilda Jackson MD  09/20/24 0130       Awilda Jackson MD  09/20/24 0143

## 2024-09-20 NOTE — ED PROVIDER NOTES
Patient was accepted at shift change signout with a plan for me to follow-up on her L-spine MRI as well as her left hip MRI.  Imaging revealed:    IMPRESSION (L spine):  1.  At L4-L5 there is moderate central canal and bilateral lateral recess stenosis with moderate bilateral foraminal narrowing.  2.  At L3-L4 there is a left foraminal extrusion which encroaches on the exiting left L3 nerve root. There is mild to moderate left neural foraminal narrowing.    IMPRESSION (left hip):  1.  Marrow edema in the acetabulum, possibly degenerative though a bone contusion or stress reaction is possible. No convincing fracture.    The patient tells me that although she had some thigh pain earlier, she believed that this was the result of the Toradol injection she had received at urgent care earlier today.  She has not been experiencing leg pain.  It is hard to say for sure whether the hip pain could be radicular, or if it is related truly to a primary hip issue.  Given the marrow edema seen on the hip imaging, it seems perhaps more likely that that is the case.  I did discuss her spinal pathology with her, as well as the fact that there are no severe/emergent findings.  She is encouraged to follow-up on this.  She was able to ambulate here in the ED after receiving oxycodone.  I will give a short course of oxycodone, but advised that she hold her cyclobenzaprine while taking this as we do not want to cause oversedation.  I did discuss moving slowly and being cautious while using this medication as well.  I will also prescribe Colace.  I did place an Ortho  referral as I think it is worthwhile for them to assess whether these findings may be amenable to steroid injection.  They are encouraged to return if she feels that she can no longer ambulate safely, or has any new or worsening symptoms or any other concerns.  The patient and her partner verbalized understanding and are agreeable to the plan.    Dictation  Disclaimer: Some of this Note has been completed with voice-recognition dictation software. Although errors are generally corrected real-time, there is the potential for a rare error to be present in the completed chart.       Niesha Salcido MD  09/20/24 0563

## 2024-09-20 NOTE — ED TRIAGE NOTES
"      Patient presents to ED with CC of left hip/lower back pain. Patient was seen at urgent care earlier today and was given a toradol shot and now complains of \"warmth and pain to left leg \" and no relief of pain.   "

## 2024-09-20 NOTE — DISCHARGE INSTRUCTIONS
Use the oxycodone as planned. Please use the walker. Don't use the cyclobenzaprine while using the oxycodone. Follow up with orthopedics within a week. Return with any worsening or concerns. Follow up with your clinic doctor as well.

## 2024-09-27 ENCOUNTER — HOSPITAL ENCOUNTER (EMERGENCY)
Facility: CLINIC | Age: 69
Discharge: HOME HEALTH | End: 2024-09-27
Attending: EMERGENCY MEDICINE | Admitting: EMERGENCY MEDICINE
Payer: COMMERCIAL

## 2024-09-27 ENCOUNTER — APPOINTMENT (OUTPATIENT)
Dept: MRI IMAGING | Facility: CLINIC | Age: 69
End: 2024-09-27
Attending: EMERGENCY MEDICINE
Payer: COMMERCIAL

## 2024-09-27 VITALS
DIASTOLIC BLOOD PRESSURE: 84 MMHG | TEMPERATURE: 97.8 F | RESPIRATION RATE: 18 BRPM | HEART RATE: 60 BPM | SYSTOLIC BLOOD PRESSURE: 158 MMHG | HEIGHT: 60 IN | OXYGEN SATURATION: 100 % | BODY MASS INDEX: 49.87 KG/M2 | WEIGHT: 254 LBS

## 2024-09-27 DIAGNOSIS — R42 VERTIGO: ICD-10-CM

## 2024-09-27 LAB
ALBUMIN SERPL BCG-MCNC: 3.8 G/DL (ref 3.5–5.2)
ALP SERPL-CCNC: 106 U/L (ref 40–150)
ALT SERPL W P-5'-P-CCNC: 17 U/L (ref 0–50)
ANION GAP SERPL CALCULATED.3IONS-SCNC: 17 MMOL/L (ref 7–15)
AST SERPL W P-5'-P-CCNC: 26 U/L (ref 0–45)
BASOPHILS # BLD AUTO: 0 10E3/UL (ref 0–0.2)
BASOPHILS NFR BLD AUTO: 1 %
BILIRUB SERPL-MCNC: 0.5 MG/DL
BUN SERPL-MCNC: 26.7 MG/DL (ref 8–23)
CALCIUM SERPL-MCNC: 9 MG/DL (ref 8.8–10.4)
CHLORIDE SERPL-SCNC: 106 MMOL/L (ref 98–107)
CREAT SERPL-MCNC: 1.22 MG/DL (ref 0.51–0.95)
EGFRCR SERPLBLD CKD-EPI 2021: 48 ML/MIN/1.73M2
EOSINOPHIL # BLD AUTO: 0.1 10E3/UL (ref 0–0.7)
EOSINOPHIL NFR BLD AUTO: 1 %
ERYTHROCYTE [DISTWIDTH] IN BLOOD BY AUTOMATED COUNT: 13.8 % (ref 10–15)
GLUCOSE SERPL-MCNC: 100 MG/DL (ref 70–99)
HCO3 SERPL-SCNC: 15 MMOL/L (ref 22–29)
HCT VFR BLD AUTO: 45.1 % (ref 35–47)
HGB BLD-MCNC: 14.5 G/DL (ref 11.7–15.7)
IMM GRANULOCYTES # BLD: 0 10E3/UL
IMM GRANULOCYTES NFR BLD: 0 %
INR PPP: 1.01 (ref 0.85–1.15)
LYMPHOCYTES # BLD AUTO: 1.3 10E3/UL (ref 0.8–5.3)
LYMPHOCYTES NFR BLD AUTO: 23 %
MCH RBC QN AUTO: 29.7 PG (ref 26.5–33)
MCHC RBC AUTO-ENTMCNC: 32.2 G/DL (ref 31.5–36.5)
MCV RBC AUTO: 92 FL (ref 78–100)
MONOCYTES # BLD AUTO: 0.3 10E3/UL (ref 0–1.3)
MONOCYTES NFR BLD AUTO: 5 %
NEUTROPHILS # BLD AUTO: 3.9 10E3/UL (ref 1.6–8.3)
NEUTROPHILS NFR BLD AUTO: 70 %
NRBC # BLD AUTO: 0 10E3/UL
NRBC BLD AUTO-RTO: 0 /100
PLAT MORPH BLD: NORMAL
PLATELET # BLD AUTO: NORMAL 10*3/UL
POTASSIUM SERPL-SCNC: 4.7 MMOL/L (ref 3.4–5.3)
PROT SERPL-MCNC: 6.9 G/DL (ref 6.4–8.3)
RBC # BLD AUTO: 4.88 10E6/UL (ref 3.8–5.2)
RBC MORPH BLD: NORMAL
SODIUM SERPL-SCNC: 138 MMOL/L (ref 135–145)
TROPONIN T SERPL HS-MCNC: <6 NG/L
WBC # BLD AUTO: 5.6 10E3/UL (ref 4–11)

## 2024-09-27 PROCEDURE — 70551 MRI BRAIN STEM W/O DYE: CPT

## 2024-09-27 PROCEDURE — 84484 ASSAY OF TROPONIN QUANT: CPT | Performed by: EMERGENCY MEDICINE

## 2024-09-27 PROCEDURE — 99284 EMERGENCY DEPT VISIT MOD MDM: CPT | Performed by: EMERGENCY MEDICINE

## 2024-09-27 PROCEDURE — 85004 AUTOMATED DIFF WBC COUNT: CPT | Performed by: EMERGENCY MEDICINE

## 2024-09-27 PROCEDURE — 250N000013 HC RX MED GY IP 250 OP 250 PS 637: Performed by: EMERGENCY MEDICINE

## 2024-09-27 PROCEDURE — 70551 MRI BRAIN STEM W/O DYE: CPT | Mod: 26 | Performed by: RADIOLOGY

## 2024-09-27 PROCEDURE — 80053 COMPREHEN METABOLIC PANEL: CPT | Performed by: EMERGENCY MEDICINE

## 2024-09-27 PROCEDURE — 36415 COLL VENOUS BLD VENIPUNCTURE: CPT | Performed by: EMERGENCY MEDICINE

## 2024-09-27 PROCEDURE — 99285 EMERGENCY DEPT VISIT HI MDM: CPT | Mod: 25 | Performed by: EMERGENCY MEDICINE

## 2024-09-27 PROCEDURE — 93005 ELECTROCARDIOGRAM TRACING: CPT | Performed by: EMERGENCY MEDICINE

## 2024-09-27 PROCEDURE — 93010 ELECTROCARDIOGRAM REPORT: CPT | Performed by: EMERGENCY MEDICINE

## 2024-09-27 PROCEDURE — 85610 PROTHROMBIN TIME: CPT | Performed by: EMERGENCY MEDICINE

## 2024-09-27 PROCEDURE — 85018 HEMOGLOBIN: CPT | Performed by: EMERGENCY MEDICINE

## 2024-09-27 RX ORDER — IBUPROFEN 600 MG/1
600 TABLET, FILM COATED ORAL ONCE
Status: COMPLETED | OUTPATIENT
Start: 2024-09-27 | End: 2024-09-27

## 2024-09-27 RX ORDER — MECLIZINE HYDROCHLORIDE 25 MG/1
TABLET ORAL
Qty: 21 TABLET | Refills: 0 | Status: SHIPPED | OUTPATIENT
Start: 2024-09-27

## 2024-09-27 RX ORDER — ONDANSETRON 2 MG/ML
4 INJECTION INTRAMUSCULAR; INTRAVENOUS EVERY 30 MIN PRN
Status: DISCONTINUED | OUTPATIENT
Start: 2024-09-27 | End: 2024-09-27 | Stop reason: HOSPADM

## 2024-09-27 RX ORDER — MECLIZINE HYDROCHLORIDE 25 MG/1
25 TABLET ORAL ONCE
Status: COMPLETED | OUTPATIENT
Start: 2024-09-27 | End: 2024-09-27

## 2024-09-27 RX ADMIN — MECLIZINE HYDROCHLORIDE 25 MG: 25 TABLET ORAL at 14:51

## 2024-09-27 RX ADMIN — IBUPROFEN 600 MG: 600 TABLET, FILM COATED ORAL at 11:02

## 2024-09-27 RX ADMIN — MECLIZINE HYDROCHLORIDE 25 MG: 25 TABLET ORAL at 09:02

## 2024-09-27 ASSESSMENT — ACTIVITIES OF DAILY LIVING (ADL)
ADLS_ACUITY_SCORE: 35

## 2024-09-27 ASSESSMENT — COLUMBIA-SUICIDE SEVERITY RATING SCALE - C-SSRS
1. IN THE PAST MONTH, HAVE YOU WISHED YOU WERE DEAD OR WISHED YOU COULD GO TO SLEEP AND NOT WAKE UP?: NO
2. HAVE YOU ACTUALLY HAD ANY THOUGHTS OF KILLING YOURSELF IN THE PAST MONTH?: NO
6. HAVE YOU EVER DONE ANYTHING, STARTED TO DO ANYTHING, OR PREPARED TO DO ANYTHING TO END YOUR LIFE?: NO

## 2024-09-27 NOTE — ED PROVIDER NOTES
[facility] EMERGENCY DEPARTMENT ([facility])    9/27/24       ED PROVIDER NOTE  ED 24    History     Chief Complaint   Patient presents with    Dizziness     HPI  Theodroa Hamilton is a 69 year old female who over the last 2 days has woken up in the morning with vertiginous symptoms.  Patient states that with movement it is exacerbated and eventually it gets exhausted with rest but states that it lasted approximately 4 hours yesterday going away by around 11 AM.  The patient had twinges of vertigo yesterday but went to bed last night without vertigo and woke up again this morning with significant vertigo.  Patient has never had this before. She denies any fevers, denies any trauma, denies any headache and denies any focal numbness or weakness. She denies any difficulty speaking.  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    This part of the medical record was transcribed by ADAM SANTOYO Medical Scribe, from a dictation done by Yevgeniy Green MD.     Past Medical History  Past Medical History:   Diagnosis Date    Arthritis     CKD (chronic kidney disease) stage 3, GFR 30-59 ml/min (H)     Essential hypertension 11/13/2020    Vitamin D deficiency 08/04/2023     Past Surgical History:   Procedure Laterality Date    COLONOSCOPY      COLONOSCOPY N/A 11/15/2019    Procedure: COLONOSCOPY, WITH POLYPECTOMY AND BIOPSY;  Surgeon: Jacob Nieves MD;  Location:  OR    ESOPHAGOSCOPY, GASTROSCOPY, DUODENOSCOPY (EGD), COMBINED N/A 11/20/2023    Procedure: ESOPHAGOGASTRODUODENOSCOPY, WITH BIOPSY;  Surgeon: Forrest Villanueva MD;  Location: [facility] OR    INCISION AND DRAINAGE FINGER, COMBINED Left 7/9/2019    Procedure: Incision and Drainage of Left Index Finger;  Surgeon: Lowell Whelan MD;  Location: [facility] OR    NO HISTORY OF SURGERY       alum & mag hydroxide-simethicone (MYLANTA/MAALOX) 200-200-20 MG/5ML SUSP  suspension  amLODIPine (NORVASC) 2.5 MG tablet  atorvastatin (LIPITOR) 10 MG tablet  diphenhydrAMINE (BENADRYL) 25 MG tablet  docusate sodium (COLACE) 100 MG capsule  lisinopril (ZESTRIL) 40 MG tablet  omeprazole (PRILOSEC) 40 MG DR capsule  vitamin D3 (CHOLECALCIFEROL) 50 mcg (2000 units) tablet      Allergies   Allergen Reactions    Amoxicillin Hives     Family History  Family History   Problem Relation Age of Onset    C.A.D. Maternal Grandmother     C.A.D. Maternal Grandfather         17 MI's per history    C.A.D. Paternal Grandmother     Diabetes Paternal Grandmother     C.A.D. Paternal Grandfather     Diabetes Paternal Grandfather     Hypertension Mother     C.A.D. Mother         pacemaker    Circulatory Father         Carotid artery stenosis    Respiratory Father 75        COPD    Coronary Artery Disease Father     Substance Abuse Father     Diabetes Paternal Aunt     Diabetes Paternal Uncle     Coronary Artery Disease Brother     Breast Cancer Cousin     Asthma No family hx of     Cerebrovascular Disease No family hx of     Cancer - colorectal No family hx of     Prostate Cancer No family hx of      Social History   Social History     Tobacco Use    Smoking status: Former     Current packs/day: 0.00     Types: Cigarettes    Smokeless tobacco: Never   Vaping Use    Vaping status: Never Used   Substance Use Topics    Alcohol use: Yes     Comment: 5 times per year    Drug use: No      Past medical history, past surgical history, medications, allergies, family history, and social history were reviewed with the patient. No additional pertinent items.   A medically appropriate review of systems was performed with pertinent positives and negatives noted in the HPI, and all other systems negative.    Physical Exam   BP: (!) 179/85  Pulse: 60  Temp: 97.8  F (36.6  C)  Resp: 18  Height: 152.4 cm (5')  Weight: 115.2 kg (254 lb)  SpO2: 100 %  Physical Exam  Vitals and nursing note reviewed.   Constitutional:        Appearance: She is not ill-appearing.   HENT:      Head: Atraumatic.   Eyes:      Extraocular Movements: Extraocular movements intact.      Pupils: Pupils are equal, round, and reactive to light.      Comments: Has some slight nystagmus to the right   Cardiovascular:      Rate and Rhythm: Regular rhythm.   Pulmonary:      Breath sounds: Normal breath sounds.   Abdominal:      Palpations: Abdomen is soft.   Musculoskeletal:         General: No deformity.      Cervical back: Neck supple.   Neurological:      General: No focal deficit present.      Mental Status: She is alert and oriented to person, place, and time.      Cranial Nerves: No cranial nerve deficit.      Motor: No weakness.      Comments: Some nystagmus looking to the right   Psychiatric:         Mood and Affect: Mood normal.           ED Course, Procedures, & Data      Orders Placed This Encounter   Procedures    MR Brain w/o Contrast    INR    Comprehensive metabolic panel    Troponin T, High Sensitivity    CBC with platelets and differential    RBC and Platelet Morphology    EKG 12-lead, tracing only    Peripheral IV catheter    CBC with platelets differential       Procedures       Medications   sodium chloride (PF) 0.9% PF flush 3 mL (3 mLs Intracatheter Not Given 9/27/24 0912)   sodium chloride (PF) 0.9% PF flush 3 mL (has no administration in time range)   ondansetron (ZOFRAN) injection 4 mg (has no administration in time range)   meclizine (ANTIVERT) tablet 25 mg (25 mg Oral $Given 9/27/24 0902)   ibuprofen (ADVIL/MOTRIN) tablet 600 mg (600 mg Oral $Given 9/27/24 1102)     EKG revealed a sinus bradycardia at a rate of 58 with a NM interval of 0.148 and a QRS duration of 0.084.  The patient had a leftward axis with some T wave inversion in the inferior leads.  There was no acute ischemic ST segment elevations.  As read by me personally.       Results for orders placed or performed during the hospital encounter of 09/27/24   INR     Status: Normal    Result Value Ref Range    INR 1.01 0.85 - 1.15   Comprehensive metabolic panel     Status: Abnormal   Result Value Ref Range    Sodium 138 135 - 145 mmol/L    Potassium 4.7 3.4 - 5.3 mmol/L    Carbon Dioxide (CO2) 15 (L) 22 - 29 mmol/L    Anion Gap 17 (H) 7 - 15 mmol/L    Urea Nitrogen 26.7 (H) 8.0 - 23.0 mg/dL    Creatinine 1.22 (H) 0.51 - 0.95 mg/dL    GFR Estimate 48 (L) >60 mL/min/1.73m2    Calcium 9.0 8.8 - 10.4 mg/dL    Chloride 106 98 - 107 mmol/L    Glucose 100 (H) 70 - 99 mg/dL    Alkaline Phosphatase 106 40 - 150 U/L    AST 26 0 - 45 U/L    ALT 17 0 - 50 U/L    Protein Total 6.9 6.4 - 8.3 g/dL    Albumin 3.8 3.5 - 5.2 g/dL    Bilirubin Total 0.5 <=1.2 mg/dL   Troponin T, High Sensitivity     Status: Normal   Result Value Ref Range    Troponin T, High Sensitivity <6 <=14 ng/L   CBC with platelets and differential     Status: None   Result Value Ref Range    WBC Count 5.6 4.0 - 11.0 10e3/uL    RBC Count 4.88 3.80 - 5.20 10e6/uL    Hemoglobin 14.5 11.7 - 15.7 g/dL    Hematocrit 45.1 35.0 - 47.0 %    MCV 92 78 - 100 fL    MCH 29.7 26.5 - 33.0 pg    MCHC 32.2 31.5 - 36.5 g/dL    RDW 13.8 10.0 - 15.0 %    Platelet Count      % Neutrophils 70 %    % Lymphocytes 23 %    % Monocytes 5 %    % Eosinophils 1 %    % Basophils 1 %    % Immature Granulocytes 0 %    NRBCs per 100 WBC 0 <1 /100    Absolute Neutrophils 3.9 1.6 - 8.3 10e3/uL    Absolute Lymphocytes 1.3 0.8 - 5.3 10e3/uL    Absolute Monocytes 0.3 0.0 - 1.3 10e3/uL    Absolute Eosinophils 0.1 0.0 - 0.7 10e3/uL    Absolute Basophils 0.0 0.0 - 0.2 10e3/uL    Absolute Immature Granulocytes 0.0 <=0.4 10e3/uL    Absolute NRBCs 0.0 10e3/uL   RBC and Platelet Morphology     Status: None   Result Value Ref Range    RBC Morphology Confirmed RBC Indices     Platelet Assessment  Automated Count Confirmed. Platelet morphology is normal.     Automated Count Confirmed. Platelet morphology is normal.   EKG 12-lead, tracing only     Status: None (Preliminary result)    Result Value Ref Range    Systolic Blood Pressure  mmHg    Diastolic Blood Pressure  mmHg    Ventricular Rate 58 BPM    Atrial Rate 58 BPM    AR Interval 148 ms    QRS Duration 84 ms     ms    QTc 390 ms    P Axis 16 degrees    R AXIS -21 degrees    T Axis -8 degrees    Interpretation ECG       Sinus bradycardia  Moderate voltage criteria for LVH, may be normal variant ( R in aVL , Markell product )  Possible Anterior infarct , age undetermined  Abnormal ECG     CBC with platelets differential     Status: None    Narrative    The following orders were created for panel order CBC with platelets differential.  Procedure                               Abnormality         Status                     ---------                               -----------         ------                     CBC with platelets and d...[[id number]]                      Final result               RBC and Platelet Morphology[[id number]]                      Final result                 Please view results for these tests on the individual orders.     Medications   sodium chloride (PF) 0.9% PF flush 3 mL (3 mLs Intracatheter Not Given 9/27/24 0912)   sodium chloride (PF) 0.9% PF flush 3 mL (has no administration in time range)   ondansetron (ZOFRAN) injection 4 mg (has no administration in time range)   meclizine (ANTIVERT) tablet 25 mg (25 mg Oral $Given 9/27/24 0902)   ibuprofen (ADVIL/MOTRIN) tablet 600 mg (600 mg Oral $Given 9/27/24 1102)       MR Brain w/o Contrast    (Results Pending)         Critical care was not performed.     Medical Decision Making  The patient's presentation was of moderate complexity (an acute illness with systemic symptoms).    The patient's evaluation involved:  review of 2 test result(s) ordered prior to this encounter (previous MRI images)  ordering and/or review of 3+ test(s) in this encounter (see above)    The patient's management necessitated further care after sign-out to one of my partners (see their note  for further management).    Assessment & Plan      I have reviewed the nursing notes.       Current Discharge Medication List        START taking these medications    Details   meclizine (ANTIVERT) 25 MG tablet Take 1 tablet 3 times daily for the next 3 days then take 1 tablet 3 times daily as needed for persistent dizziness  Qty: 21 tablet, Refills: 0             Final diagnoses:   Vertigo     Fill your prescription and take as directed.    Please make an appointment to follow up with Your Primary Care Provider in one week for recheck.      Yevgeniy Green MD  [facility] EMERGENCY DEPARTMENT  9/27/2024        Yevgeniy Green MD  09/27/24 8093

## 2024-09-27 NOTE — DISCHARGE INSTRUCTIONS
Fill your prescription and take as directed.    Please make an appointment to follow up with Your Primary Care Provider in one week for recheck.

## 2024-09-27 NOTE — ED TRIAGE NOTES
Pt presents with a 2 day history of intermittent dizziness.  Pt states the dizziness is more severe in the morning making it difficult to get up the 6 stairs to the bathroom.  Yesterday the dizziness resolved by 11am.       Triage Assessment (Adult)       Row Name 09/27/24 0820          Triage Assessment    Airway WDL WDL        Respiratory WDL    Respiratory WDL WDL        Skin Circulation/Temperature WDL    Skin Circulation/Temperature WDL WDL        Cardiac WDL    Cardiac WDL WDL        Peripheral/Neurovascular WDL    Peripheral Neurovascular WDL WDL        Cognitive/Neuro/Behavioral WDL    Cognitive/Neuro/Behavioral WDL WDL

## 2024-09-29 LAB
ATRIAL RATE - MUSE: 58 BPM
DIASTOLIC BLOOD PRESSURE - MUSE: NORMAL MMHG
INTERPRETATION ECG - MUSE: NORMAL
P AXIS - MUSE: 16 DEGREES
PR INTERVAL - MUSE: 148 MS
QRS DURATION - MUSE: 84 MS
QT - MUSE: 398 MS
QTC - MUSE: 390 MS
R AXIS - MUSE: -21 DEGREES
SYSTOLIC BLOOD PRESSURE - MUSE: NORMAL MMHG
T AXIS - MUSE: -8 DEGREES
VENTRICULAR RATE- MUSE: 58 BPM

## 2024-09-30 NOTE — PROGRESS NOTES
Theodora is status post I&D of a cat bite left index finger.  Doing very well.  She is off her antibiotics.  Pain at this point, no fevers or chills.  She states she is had no drainage.    The past medical history was reviewed and documented in the chart.  This includes medications, surgeries, social history as well as review of systems.    Physical examination of the left hand demonstrates nicely healed incisions.  I did remove the sutures this morning.  Her finger is fairly swollen but FDS, FDP are fully intact as are the extensors.  She has full sensation distally on the radial and ulnar aspect of the digit.  Some fusiform swelling of the finger is noted which does limit her flexion.    Impression: Left index finger flexor tendon sheath infection, status post I&D    Plan: She needs to work on flexion and extension and edema control of the digit.  We will have her see therapy this morning.  Overall though I think she is doing very well.  No further indications for any surgical treatment.  I want her to increase use of the hand and finger as tolerated now.  I will plan on seeing her back in 3 to 4 weeks to reevaluate.  
147

## 2024-10-03 ENCOUNTER — OFFICE VISIT (OUTPATIENT)
Dept: PHYSICAL MEDICINE AND REHAB | Facility: CLINIC | Age: 69
End: 2024-10-03
Attending: EMERGENCY MEDICINE
Payer: COMMERCIAL

## 2024-10-03 VITALS
SYSTOLIC BLOOD PRESSURE: 153 MMHG | HEART RATE: 70 BPM | DIASTOLIC BLOOD PRESSURE: 77 MMHG | HEIGHT: 60 IN | BODY MASS INDEX: 48.06 KG/M2 | WEIGHT: 244.8 LBS

## 2024-10-03 DIAGNOSIS — M25.552 HIP PAIN, LEFT: ICD-10-CM

## 2024-10-03 DIAGNOSIS — M54.42 ACUTE LEFT-SIDED LOW BACK PAIN WITH LEFT-SIDED SCIATICA: Primary | ICD-10-CM

## 2024-10-03 PROCEDURE — 99204 OFFICE O/P NEW MOD 45 MIN: CPT | Performed by: NURSE PRACTITIONER

## 2024-10-03 ASSESSMENT — PAIN SCALES - GENERAL: PAINLEVEL: NO PAIN (1)

## 2024-10-03 NOTE — PATIENT INSTRUCTIONS
Tylenol as needed  Biofreeze as needed  Voltaren gel topical as needed as directed  Avoid any ibuprofen    Call if symptoms not improving over the next 1-2 wks could consider a short course of oral steroids    ~You have been referred for Physical Therapy to [facility]. They will call you to schedule an appointment.      Scheduling phone number is [phone] for [facility], or [place] location.  If you have not heard from the scheduling office within 2 business days, please call [phone] for ALL other locations.    Discussed the importance of core strengthening, ROM, stretching exercises and how each of these entities is important in decreasing pain and improving long term spine health.  The purpose of physical therapy is to teach you an individualized home exercise program.  These exercises need to be performed every day in order to decrease pain and prevent future occurrences of pain.           ~Please call our [facility] Nurse Navigation line [phone] with any questions or concerns about your treatment plan, if symptoms worsen and you would like to be seen urgently, or if you have any new or worsening numbness, weakness, or problems controlling bladder and bowel function.  ~You are also welcome to contact Teresita Alves via Festicket, but please be aware that responses to Festicket message may take 2-3 days due to the high volume of patients seen in clinic.

## 2024-10-03 NOTE — PROGRESS NOTES
ASSESSMENT: Theodora Hamilton is a 69 year old female who presents for consultation at the request of PCP Shirley Warner, who presents today for new patient evaluation of:    -hip pain    Patient has dec sensation left thigh. Positive L boni, mild L hip tenderness. Feels like numbness started after a toradol inj in urgent care and is improving. We reviewed her lumbar MRI and hip MRI results. Low level of pain at this point, much improved from onset. We talked about hip vs lumbar radicular etiology of sx. With improving sx, recommended starting PT. Continue topical voltaren, tylenol, lidocaine patches. We talked about neuropathic agent, declined today. Follow up after PT course or ok to cancel if doing well.          10/2/2024     9:55 AM   OSWESTRY DISABILITY INDEX   Count 4   Sum 1   Oswestry Score (%) 5 %            Diagnoses and all orders for this visit:  Acute left-sided low back pain with left-sided sciatica  -     Physical Therapy  Referral; Future  Hip pain, left  -     Orthopedic  Referral  -     Physical Therapy  Referral; Future      PLAN:  Reviewed spine anatomy and disease process. Discussed diagnosis and treatment options with the patient today. A shared decision making model was used.  The patient's values and choices were respected. The following represents what was discussed and decided upon by the provider and the patient.      -DIAGNOSTIC TESTS:  Images were personally reviewed and interpreted and explained to patient today using a spine model.   --no new imaging    -PHYSICAL THERAPY:    --Referral to PT placed  Discussed the importance of core strengthening, ROM, stretching exercises with the patient and how each of these entities is important in decreasing pain.  Explained to the patient that the purpose of physical therapy is to teach the patient a home exercise program.  These exercises need to be performed every day in order to decrease pain and prevent future  occurrences of pain.        -MEDICATIONS:    --continue topical voltaren  -continue lidocaine patches and biofreeze  -tylenol prn  -declined neuropathic agent  -no po ibuprofen or other nsaids given ckd    Discussed multiple medication options today with patient. Discussed risks, side effects, and proper use of medications. Patient verbalized understanding.    -INTERVENTIONS:    -Discussed the role of injections with patient today. Patient would be a good candidate in the future for either left hip inj, epidural steroid injections or medial branch blocks if indicated based on symptoms and supported by imaging results.      -PATIENT EDUCATION:  Total time of 40 minutes, on the day of service, spent with the patient, reviewing the chart, placing orders, and documenting.   -Today we also discussed the pros and cons of the current treatment plan.    -FOLLOW-UP:  6wks if still having any symptoms after PT    Advised patient to call the [facility] if symptoms worsen, new numbness or weakness develops in the legs, or if they develop new or worsening problems controlling bladder or bowel function.   ______________________________________________________________________    SUBJECTIVE:    HPI:  Theodora Hamilton  Is a 69 year old female hx CKD 3, htn, IBS, acid reflux, arthritis who presents today for new patient evaluation of left hip pain     Few mos of L hip pain went into the buttocks and to the waist. Started after doing chores up [place] w/ her brother.  Went to urgent care initially where she had a toradol shot which caused some numbness and pain in the anterior thigh. She then went to the ED Given oxycodone, flexeril, spine referral. She stopped these however when dizziness occurred.     She has since been using voltaren topical and biofreeze x 2 wks has helped a lot. She has taken ibuprofen at times as well. Pain now 1/10 in L hip only. Back pain is gone. Last night hip clicked and pain sig improved. The numbness in  the leg is getting better. No further leg pain.    Denies leg weakness.  She has not done PT  She has not had any chiropractic care, injections, or prev spine surgeries      -Treatment to Date:     -Medications:    Current Outpatient Medications   Medication Sig Dispense Refill    alum & mag hydroxide-simethicone (MYLANTA/MAALOX) 200-200-20 MG/5ML SUSP suspension Take 30 mLs by mouth once as needed for indigestion or heartburn (uses 2-3 times per week) (Patient not taking: Reported on 7/1/2024)      amLODIPine (NORVASC) 2.5 MG tablet Take 1 tablet (2.5 mg) by mouth daily 90 tablet 3    atorvastatin (LIPITOR) 10 MG tablet Take 1 tablet (10 mg) by mouth daily 90 tablet 3    diphenhydrAMINE (BENADRYL) 25 MG tablet Take 25-50 mg by mouth daily as needed for allergies      docusate sodium (COLACE) 100 MG capsule Take 1 capsule (100 mg) by mouth 2 times daily. 30 capsule 0    lisinopril (ZESTRIL) 40 MG tablet Take 1 tablet (40 mg) by mouth daily 90 tablet 3    meclizine (ANTIVERT) 25 MG tablet Take 1 tablet 3 times daily for the next 3 days then take 1 tablet 3 times daily as needed for persistent dizziness 21 tablet 0    omeprazole (PRILOSEC) 40 MG DR capsule Take 1 capsule (40 mg) by mouth daily 90 capsule 3    vitamin D3 (CHOLECALCIFEROL) 50 mcg (2000 units) tablet Take 1 tablet (50 mcg) by mouth daily       No current facility-administered medications for this visit.       Allergies   Allergen Reactions    Amoxicillin Hives       Past Medical History:   Diagnosis Date    Arthritis     CKD (chronic kidney disease) stage 3, GFR 30-59 ml/min (H)     Essential hypertension 11/13/2020    Vitamin D deficiency 08/04/2023        Patient Active Problem List   Diagnosis    Asymptomatic postmenopausal status    OA (Osteoarthritis) - left great toe    HYPERLIPIDEMIA LDL GOAL <160    Morbid obesity (H)    Essential hypertension    Vitamin D deficiency    CKD (chronic kidney disease) stage 3, GFR 30-59 ml/min (H)       Past  Surgical History:   Procedure Laterality Date    COLONOSCOPY      COLONOSCOPY N/A 11/15/2019    Procedure: COLONOSCOPY, WITH POLYPECTOMY AND BIOPSY;  Surgeon: Jacob Nieves MD;  Location: [facility] OR    ESOPHAGOSCOPY, GASTROSCOPY, DUODENOSCOPY (EGD), COMBINED N/A 11/20/2023    Procedure: ESOPHAGOGASTRODUODENOSCOPY, WITH BIOPSY;  Surgeon: Forrest Villanueva MD;  Location: [facility] OR    INCISION AND DRAINAGE FINGER, COMBINED Left 7/9/2019    Procedure: Incision and Drainage of Left Index Finger;  Surgeon: Lowell Whelan MD;  Location: [facility] OR    NO HISTORY OF SURGERY         Family History   Problem Relation Age of Onset    C.A.D. Maternal Grandmother     C.A.D. Maternal Grandfather         17 MI's per history    C.A.D. Paternal Grandmother     Diabetes Paternal Grandmother     C.A.D. Paternal Grandfather     Diabetes Paternal Grandfather     Hypertension Mother     C.A.D. Mother         pacemaker    Circulatory Father         Carotid artery stenosis    Respiratory Father 75        COPD    Coronary Artery Disease Father     Substance Abuse Father     Diabetes Paternal Aunt     Diabetes Paternal Uncle     Coronary Artery Disease Brother     Breast Cancer Cousin     Asthma No family hx of     Cerebrovascular Disease No family hx of     Cancer - colorectal No family hx of     Prostate Cancer No family hx of        Reviewed past medical, surgical, and family history with patient found on new patient intake packet located in EMR Media tab.     SOCIAL HX: neg to all    ROS: pos for ringing in the ears, joint pain, muscle pain, dizziness. Specifically negative for bowel/bladder dysfunction, balance changes, headache, foot drop, fevers, chills, appetite changes, nausea/vomiting, unexplained weight loss. Otherwise 13 systems reviewed are negative. Please see the patient's intake questionnaire from today for details.    OBJECTIVE:  BP (!) 153/77   Pulse 70   Ht 5' (1.524 m)   Wt 244 lb 12.8 oz (111 kg)   LMP   (LMP Unknown)   BMI 47.81 kg/m      PHYSICAL EXAMINATION:    --CONSTITUTIONAL:  Vital signs as above.  No acute distress.  The patient is well nourished and well groomed.  --PSYCHIATRIC:  Appropriate mood and affect. The patient is awake, alert, oriented to person, place, time and answering questions appropriately with clear speech.    --SKIN:  Skin over the face, bilateral lower extremities, and posterior torso is clean, dry, intact without rashes.    --RESPIRATORY: Normal rhythm and effort. No abnormal accessory muscle breathing patterns noted.   --ABDOMINAL:  Non-distended.    --GROSS MOTOR: Gait is painful w/ limp. Easily arises from a seated position.      --LOWER EXTREMITY MOTOR TESTING:  Hip flexion: right 5/5, left 5/5 (painful)  Hip abduction: right 5/5, left 5/5  Hip adduction: right 5/5, left 5/5   Quads: right 5/5, left 5/5  Hamstrings: right 5/5, left 5/5  Dorsiflexion: right 5/5, left 5/5  Plantar flexion: right 5/5, left 5/5    Great toe MTP extension/EHL: right 5/5, left 5/5    --NEUROLOGICAL:  1/4 patellar and achilles reflexes bilaterally. Sensation to light touch is decreased to light touch left anterior thigh otherwise intact throughout both lower extremities. Babinski is negative. No clonus.    --MUSCULOSKELETAL: Lumbar spine inspection reveals no evidence of deformity.  No point tenderness to palpation lumbar spine. No paraspinal musculature tenderness.     Straight leg raising is negative.    --HIPS: mildly decreased abduction of L hip. Positive L TOBY and Negative FADIR bilaterally. Very mild L hip joint tenderness to palp.    --SACROILIAC JOINT: Gaenslen's Test was negative. Thigh thrust was negative.  One finger point test was negative. Negative SI joint tenderness to palpation bilaterally.    --VASCULAR:  Bilateral lower extremities are warm without any discoloration.  There is no pitting edema of the bilateral lower extremities.    RESULTS:   Prior medical records from [facility]  and Care Everywhere were reviewed today.    Imaging: Spine imaging was personally reviewed and interpreted today. The images were shown to the patient and the findings were explained using a spine model.      MR Hip Left w/o Contrast    Result Date: 9/20/2024  EXAM: MR HIP LEFT W/O CONTRAST LOCATION: [facility] DATE: 9/20/2024 INDICATION: Severe left hip pain, evaluate for fracture or acute process. COMPARISON: CT 9/20/2024 TECHNIQUE: Unenhanced. FINDINGS: LEFT HIP: -Labrum: Irregularity of the anterosuperior labrum consistent with chronic labral tear, and paralabral cyst anteriorly. -Cartilage: Moderate diffuse thinning. -Joint space: No effusion or synovitis. -Joint capsule/ligaments: Intact joint capsule. Ligamentum teres is intact. MUSCLES AND TENDONS: -Gluteal: No tendon tear or tendinopathy. No trochanteric bursitis. -Proximal hamstring: No tendon tear or tendinopathy. -Iliopsoas: No tendon tear or tendinopathy. No bursitis. -Rectus femoris origin: No tear or tendinopathy. BONES: -No marrow edema in the acetabulum most pronounced anteriorly. No discrete fracture line. No fracture or edema in the proximal femur. SOFT TISSUES: -Normal muscle bulk. No acute muscular injury. INTRA-PELVIC CONTENTS: -Visualized portions are normal.     IMPRESSION: 1.  Marrow edema in the acetabulum, possibly degenerative though a bone contusion or stress reaction is possible. No convincing fracture.    Lumbar spine MRI w/o contrast    Result Date: 9/20/2024  EXAM: MR LUMBAR SPINE W/O CONTRAST LOCATION: [facility] DATE: 9/20/2024 INDICATION: severe L back and leg pain with leg weakness, eval for spinal abnormality nerve impingement or disc herniation COMPARISON: None. TECHNIQUE: Routine Lumbar Spine MRI without IV contrast. FINDINGS: Nomenclature is based on 5 lumbar type vertebral bodies. Vertebral body height is normal. There is 5 mm  anterolisthesis of L4 on L5. Slight retrolisthesis of L2 on L3. No pathologic marrow signal. Normal distal spinal cord and cauda equina with conus medullaris at L1-2.. No extraspinal abnormality. Unremarkable visualized bony pelvis. T12-L1: Normal disc height and signal. No herniation. Normal facets. No spinal canal or neural foraminal stenosis. L1-L2: Slight retrolisthesis of L1. Normal disc height. No herniation. Normal facets. No central canal or neural foraminal stenosis. L2-L3: Slight retrolisthesis of L2. Mild loss of disc height with disc desiccation. Mild broad-based annular bulge. Normal facets. No central canal stenosis or neural foraminal narrowing. L3-L4: Mild loss of disc height with disc desiccation. Annular bulge. Left foraminal extrusion. No central canal or right neural foraminal narrowing. There is mild to moderate left neural foraminal narrowing. L4-L5: Grade 1 degenerative anterolisthesis of L4. Mild loss of disc height. Mild annular bulge. Moderate to advanced facet arthropathy. Moderate central canal and bilateral lateral recess stenosis. Moderate foraminal narrowing. L5-S1: Normal disc height and signal. No herniation. Normal facets. No spinal canal or neural foraminal stenosis.     IMPRESSION: 1.  At L4-L5 there is moderate central canal and bilateral lateral recess stenosis with moderate bilateral foraminal narrowing. 2.  At L3-L4 there is a left foraminal extrusion which encroaches on the exiting left L3 nerve root. There is mild to moderate left neural foraminal narrowing.     CT Pelvis Bone wo Contrast    Result Date: 9/20/2024  EXAM: CT HIP LEFT W/O CONTRAST, CT PELVIS BONE WO CONTRAST LOCATION: [facility] DATE: 9/20/2024 INDICATION: Hip pain; Fracture, known or r o, or trauma; No fracture f u or history of osteoarthritis; No hip x ray result available COMPARISON: None. TECHNIQUE: Noncontrast. Axial, sagittal and coronal thin-section  reconstruction. Dose reduction techniques were used. FINDINGS: BONES: -No evidence of acute fracture or dislocation. -Mild degenerative changes of the bilateral hips. SOFT TISSUES: -Visualized musculature appears symmetric in bulk. -Visualized subcutaneous fat appears unremarkable. OTHER FINDINGS: -Mild vascular calcifications. -Visualized intrapelvic structures are otherwise grossly unremarkable.     IMPRESSION: 1.  No evidence of acute fracture or dislocation.       CT Hip Left w/o Contrast    Result Date: 9/20/2024  EXAM: CT HIP LEFT W/O CONTRAST, CT PELVIS BONE WO CONTRAST LOCATION: [facility] DATE: 9/20/2024 INDICATION: Hip pain; Fracture, known or r o, or trauma; No fracture f u or history of osteoarthritis; No hip x ray result available COMPARISON: None. TECHNIQUE: Noncontrast. Axial, sagittal and coronal thin-section reconstruction. Dose reduction techniques were used. FINDINGS: BONES: -No evidence of acute fracture or dislocation. -Mild degenerative changes of the bilateral hips. SOFT TISSUES: -Visualized musculature appears symmetric in bulk. -Visualized subcutaneous fat appears unremarkable. OTHER FINDINGS: -Mild vascular calcifications. -Visualized intrapelvic structures are otherwise grossly unremarkable.     IMPRESSION: 1.  No evidence of acute fracture or dislocation.     XR Lumbar Spine 3 Views [facility]    Result Date: 9/19/2024  For Patients: As a result of the 21st Century Cures Act, medical imaging exams and procedure reports are released immediately into your electronic medical record. You may view this report before your referring provider. If you have questions, please contact your health care provider. EXAM: XR SPINE LUMBAR 3 VIEWS LOCATION: [facility] DATE: 9/19/2024 INDICATION: Hip Pain, Left COMPARISON: None.    Vertebral body heights are maintained. There is no evidence of acute fracture. Grade 1 anterolisthesis L4-L5. Mild intervertebral  disc height loss at L4-L5. Scattered endplate and facet joint degenerative changes. There is atherosclerotic calcification of the abdominal aorta and bilateral hip joint osteoarthritis.        This note was dictated using voice recognition software. Any grammatical or context distortions are unintentional and inherent to the software.       Teresita LOZOYAP-C  [facility]  O. [phone]

## 2024-10-03 NOTE — LETTER
10/3/2024      Theodora Hamilton  [address] [facility] [address]      Dear Colleague,    Thank you for referring your patient, Theodora Hamilton, to the [facility]. Please see a copy of my visit note below.    ASSESSMENT: Theodora Hamilton is a 69 year old female who presents for consultation at the request of PCP Shirley Warner, who presents today for new patient evaluation of:    -hip pain    Patient has dec sensation left thigh. Positive L boni, mild L hip tenderness. Feels like numbness started after a toradol inj in urgent care and is improving. We reviewed her lumbar MRI and hip MRI results. Low level of pain at this point, much improved from onset. We talked about hip vs lumbar radicular etiology of sx. With improving sx, recommended starting PT. Continue topical voltaren, tylenol, lidocaine patches. We talked about neuropathic agent, declined today. Follow up after PT course or ok to cancel if doing well.          10/2/2024     9:55 AM   OSWESTRY DISABILITY INDEX   Count 4   Sum 1   Oswestry Score (%) 5 %            Diagnoses and all orders for this visit:  Acute left-sided low back pain with left-sided sciatica  -     Physical Therapy  Referral; Future  Hip pain, left  -     Orthopedic  Referral  -     Physical Therapy  Referral; Future      PLAN:  Reviewed spine anatomy and disease process. Discussed diagnosis and treatment options with the patient today. A shared decision making model was used.  The patient's values and choices were respected. The following represents what was discussed and decided upon by the provider and the patient.      -DIAGNOSTIC TESTS:  Images were personally reviewed and interpreted and explained to patient today using a spine model.   --no new imaging    -PHYSICAL THERAPY:    --Referral to PT placed  Discussed the importance of core strengthening, ROM, stretching exercises with the patient and how each of these  entities is important in decreasing pain.  Explained to the patient that the purpose of physical therapy is to teach the patient a home exercise program.  These exercises need to be performed every day in order to decrease pain and prevent future occurrences of pain.        -MEDICATIONS:    --continue topical voltaren  -continue lidocaine patches and biofreeze  -tylenol prn  -declined neuropathic agent  -no po ibuprofen or other nsaids given ckd    Discussed multiple medication options today with patient. Discussed risks, side effects, and proper use of medications. Patient verbalized understanding.    -INTERVENTIONS:    -Discussed the role of injections with patient today. Patient would be a good candidate in the future for either left hip inj, epidural steroid injections or medial branch blocks if indicated based on symptoms and supported by imaging results.      -PATIENT EDUCATION:  Total time of 40 minutes, on the day of service, spent with the patient, reviewing the chart, placing orders, and documenting.   -Today we also discussed the pros and cons of the current treatment plan.    -FOLLOW-UP:  6wks if still having any symptoms after PT    Advised patient to call the Spine Center if symptoms worsen, new numbness or weakness develops in the legs, or if they develop new or worsening problems controlling bladder or bowel function.   ______________________________________________________________________    SUBJECTIVE:    HPI:  Theodora Hamilton  Is a 69 year old female hx CKD 3, htn, IBS, acid reflux, arthritis who presents today for new patient evaluation of left hip pain     Few mos of L hip pain went into the buttocks and to the waist. Started after doing chores up [place] w/ her brother.  Went to urgent care initially where she had a toradol shot which caused some numbness and pain in the anterior thigh. She then went to the ED Given oxycodone, flexeril, spine referral. She stopped these however when dizziness  occurred.     She has since been using voltaren topical and biofreeze x 2 wks has helped a lot. She has taken ibuprofen at times as well. Pain now 1/10 in L hip only. Back pain is gone. Last night hip clicked and pain sig improved. The numbness in the leg is getting better. No further leg pain.    Denies leg weakness.  She has not done PT  She has not had any chiropractic care, injections, or prev spine surgeries      -Treatment to Date:     -Medications:    Current Outpatient Medications   Medication Sig Dispense Refill     alum & mag hydroxide-simethicone (MYLANTA/MAALOX) 200-200-20 MG/5ML SUSP suspension Take 30 mLs by mouth once as needed for indigestion or heartburn (uses 2-3 times per week) (Patient not taking: Reported on 7/1/2024)       amLODIPine (NORVASC) 2.5 MG tablet Take 1 tablet (2.5 mg) by mouth daily 90 tablet 3     atorvastatin (LIPITOR) 10 MG tablet Take 1 tablet (10 mg) by mouth daily 90 tablet 3     diphenhydrAMINE (BENADRYL) 25 MG tablet Take 25-50 mg by mouth daily as needed for allergies       docusate sodium (COLACE) 100 MG capsule Take 1 capsule (100 mg) by mouth 2 times daily. 30 capsule 0     lisinopril (ZESTRIL) 40 MG tablet Take 1 tablet (40 mg) by mouth daily 90 tablet 3     meclizine (ANTIVERT) 25 MG tablet Take 1 tablet 3 times daily for the next 3 days then take 1 tablet 3 times daily as needed for persistent dizziness 21 tablet 0     omeprazole (PRILOSEC) 40 MG DR capsule Take 1 capsule (40 mg) by mouth daily 90 capsule 3     vitamin D3 (CHOLECALCIFEROL) 50 mcg (2000 units) tablet Take 1 tablet (50 mcg) by mouth daily       No current facility-administered medications for this visit.       Allergies   Allergen Reactions     Amoxicillin Hives       Past Medical History:   Diagnosis Date     Arthritis      CKD (chronic kidney disease) stage 3, GFR 30-59 ml/min (H)      Essential hypertension 11/13/2020     Vitamin D deficiency 08/04/2023        Patient Active Problem List   Diagnosis      Asymptomatic postmenopausal status     OA (Osteoarthritis) - left great toe     HYPERLIPIDEMIA LDL GOAL <160     Morbid obesity (H)     Essential hypertension     Vitamin D deficiency     CKD (chronic kidney disease) stage 3, GFR 30-59 ml/min (H)       Past Surgical History:   Procedure Laterality Date     COLONOSCOPY       COLONOSCOPY N/A 11/15/2019    Procedure: COLONOSCOPY, WITH POLYPECTOMY AND BIOPSY;  Surgeon: Jacob Nieves MD;  Location: [facility] OR     ESOPHAGOSCOPY, GASTROSCOPY, DUODENOSCOPY (EGD), COMBINED N/A 11/20/2023    Procedure: ESOPHAGOGASTRODUODENOSCOPY, WITH BIOPSY;  Surgeon: Forrest Villanueva MD;  Location: [facility] OR     INCISION AND DRAINAGE FINGER, COMBINED Left 7/9/2019    Procedure: Incision and Drainage of Left Index Finger;  Surgeon: Lowell Whelan MD;  Location: [facility] OR     NO HISTORY OF SURGERY         Family History   Problem Relation Age of Onset     C.A.D. Maternal Grandmother      C.A.D. Maternal Grandfather         17 MI's per history     C.A.D. Paternal Grandmother      Diabetes Paternal Grandmother      C.A.D. Paternal Grandfather      Diabetes Paternal Grandfather      Hypertension Mother      C.A.D. Mother         pacemaker     Circulatory Father         Carotid artery stenosis     Respiratory Father 75        COPD     Coronary Artery Disease Father      Substance Abuse Father      Diabetes Paternal Aunt      Diabetes Paternal Uncle      Coronary Artery Disease Brother      Breast Cancer Cousin      Asthma No family hx of      Cerebrovascular Disease No family hx of      Cancer - colorectal No family hx of      Prostate Cancer No family hx of        Reviewed past medical, surgical, and family history with patient found on new patient intake packet located in EMR Media tab.     SOCIAL HX: neg to all    ROS: pos for ringing in the ears, joint pain, muscle pain, dizziness. Specifically negative for bowel/bladder dysfunction, balance changes, headache, foot drop,  fevers, chills, appetite changes, nausea/vomiting, unexplained weight loss. Otherwise 13 systems reviewed are negative. Please see the patient's intake questionnaire from today for details.    OBJECTIVE:  BP (!) 153/77   Pulse 70   Ht 5' (1.524 m)   Wt 244 lb 12.8 oz (111 kg)   LMP  (LMP Unknown)   BMI 47.81 kg/m      PHYSICAL EXAMINATION:    --CONSTITUTIONAL:  Vital signs as above.  No acute distress.  The patient is well nourished and well groomed.  --PSYCHIATRIC:  Appropriate mood and affect. The patient is awake, alert, oriented to person, place, time and answering questions appropriately with clear speech.    --SKIN:  Skin over the face, bilateral lower extremities, and posterior torso is clean, dry, intact without rashes.    --RESPIRATORY: Normal rhythm and effort. No abnormal accessory muscle breathing patterns noted.   --ABDOMINAL:  Non-distended.    --GROSS MOTOR: Gait is painful w/ limp. Easily arises from a seated position.      --LOWER EXTREMITY MOTOR TESTING:  Hip flexion: right 5/5, left 5/5 (painful)  Hip abduction: right 5/5, left 5/5  Hip adduction: right 5/5, left 5/5   Quads: right 5/5, left 5/5  Hamstrings: right 5/5, left 5/5  Dorsiflexion: right 5/5, left 5/5  Plantar flexion: right 5/5, left 5/5    Great toe MTP extension/EHL: right 5/5, left 5/5    --NEUROLOGICAL:  1/4 patellar and achilles reflexes bilaterally. Sensation to light touch is decreased to light touch left anterior thigh otherwise intact throughout both lower extremities. Babinski is negative. No clonus.    --MUSCULOSKELETAL: Lumbar spine inspection reveals no evidence of deformity.  No point tenderness to palpation lumbar spine. No paraspinal musculature tenderness.     Straight leg raising is negative.    --HIPS: mildly decreased abduction of L hip. Positive L TOBY and Negative FADIR bilaterally. Very mild L hip joint tenderness to palp.    --SACROILIAC JOINT: Gaenslen's Test was negative. Thigh thrust was negative.  One  finger point test was negative. Negative SI joint tenderness to palpation bilaterally.    --VASCULAR:  Bilateral lower extremities are warm without any discoloration.  There is no pitting edema of the bilateral lower extremities.    RESULTS:   Prior medical records from [facility] Care Everywhere were reviewed today.    Imaging: Spine imaging was personally reviewed and interpreted today. The images were shown to the patient and the findings were explained using a spine model.      MR Hip Left w/o Contrast    Result Date: 9/20/2024  EXAM: MR HIP LEFT W/O CONTRAST LOCATION: [facility] DATE: 9/20/2024 INDICATION: Severe left hip pain, evaluate for fracture or acute process. COMPARISON: CT 9/20/2024 TECHNIQUE: Unenhanced. FINDINGS: LEFT HIP: -Labrum: Irregularity of the anterosuperior labrum consistent with chronic labral tear, and paralabral cyst anteriorly. -Cartilage: Moderate diffuse thinning. -Joint space: No effusion or synovitis. -Joint capsule/ligaments: Intact joint capsule. Ligamentum teres is intact. MUSCLES AND TENDONS: -Gluteal: No tendon tear or tendinopathy. No trochanteric bursitis. -Proximal hamstring: No tendon tear or tendinopathy. -Iliopsoas: No tendon tear or tendinopathy. No bursitis. -Rectus femoris origin: No tear or tendinopathy. BONES: -No marrow edema in the acetabulum most pronounced anteriorly. No discrete fracture line. No fracture or edema in the proximal femur. SOFT TISSUES: -Normal muscle bulk. No acute muscular injury. INTRA-PELVIC CONTENTS: -Visualized portions are normal.     IMPRESSION: 1.  Marrow edema in the acetabulum, possibly degenerative though a bone contusion or stress reaction is possible. No convincing fracture.    Lumbar spine MRI w/o contrast    Result Date: 9/20/2024  EXAM: MR LUMBAR SPINE W/O CONTRAST LOCATION: [facility] DATE: 9/20/2024 INDICATION: severe L back and  leg pain with leg weakness, eval for spinal abnormality nerve impingement or disc herniation COMPARISON: None. TECHNIQUE: Routine Lumbar Spine MRI without IV contrast. FINDINGS: Nomenclature is based on 5 lumbar type vertebral bodies. Vertebral body height is normal. There is 5 mm anterolisthesis of L4 on L5. Slight retrolisthesis of L2 on L3. No pathologic marrow signal. Normal distal spinal cord and cauda equina with conus medullaris at L1-2.. No extraspinal abnormality. Unremarkable visualized bony pelvis. T12-L1: Normal disc height and signal. No herniation. Normal facets. No spinal canal or neural foraminal stenosis. L1-L2: Slight retrolisthesis of L1. Normal disc height. No herniation. Normal facets. No central canal or neural foraminal stenosis. L2-L3: Slight retrolisthesis of L2. Mild loss of disc height with disc desiccation. Mild broad-based annular bulge. Normal facets. No central canal stenosis or neural foraminal narrowing. L3-L4: Mild loss of disc height with disc desiccation. Annular bulge. Left foraminal extrusion. No central canal or right neural foraminal narrowing. There is mild to moderate left neural foraminal narrowing. L4-L5: Grade 1 degenerative anterolisthesis of L4. Mild loss of disc height. Mild annular bulge. Moderate to advanced facet arthropathy. Moderate central canal and bilateral lateral recess stenosis. Moderate foraminal narrowing. L5-S1: Normal disc height and signal. No herniation. Normal facets. No spinal canal or neural foraminal stenosis.     IMPRESSION: 1.  At L4-L5 there is moderate central canal and bilateral lateral recess stenosis with moderate bilateral foraminal narrowing. 2.  At L3-L4 there is a left foraminal extrusion which encroaches on the exiting left L3 nerve root. There is mild to moderate left neural foraminal narrowing.     CT Pelvis Bone wo Contrast    Result Date: 9/20/2024  EXAM: CT HIP LEFT W/O CONTRAST, CT PELVIS BONE WO CONTRAST LOCATION: [facility]  [facility] DATE: 9/20/2024 INDICATION: Hip pain; Fracture, known or r o, or trauma; No fracture f u or history of osteoarthritis; No hip x ray result available COMPARISON: None. TECHNIQUE: Noncontrast. Axial, sagittal and coronal thin-section reconstruction. Dose reduction techniques were used. FINDINGS: BONES: -No evidence of acute fracture or dislocation. -Mild degenerative changes of the bilateral hips. SOFT TISSUES: -Visualized musculature appears symmetric in bulk. -Visualized subcutaneous fat appears unremarkable. OTHER FINDINGS: -Mild vascular calcifications. -Visualized intrapelvic structures are otherwise grossly unremarkable.     IMPRESSION: 1.  No evidence of acute fracture or dislocation.       CT Hip Left w/o Contrast    Result Date: 9/20/2024  EXAM: CT HIP LEFT W/O CONTRAST, CT PELVIS BONE WO CONTRAST LOCATION: [facility] DATE: 9/20/2024 INDICATION: Hip pain; Fracture, known or r o, or trauma; No fracture f u or history of osteoarthritis; No hip x ray result available COMPARISON: None. TECHNIQUE: Noncontrast. Axial, sagittal and coronal thin-section reconstruction. Dose reduction techniques were used. FINDINGS: BONES: -No evidence of acute fracture or dislocation. -Mild degenerative changes of the bilateral hips. SOFT TISSUES: -Visualized musculature appears symmetric in bulk. -Visualized subcutaneous fat appears unremarkable. OTHER FINDINGS: -Mild vascular calcifications. -Visualized intrapelvic structures are otherwise grossly unremarkable.     IMPRESSION: 1.  No evidence of acute fracture or dislocation.     XR Lumbar Spine 3 Views [facility]    Result Date: 9/19/2024  For Patients: As a result of the 21st Century Cures Act, medical imaging exams and procedure reports are released immediately into your electronic medical record. You may view this report before your referring provider. If you have questions, please  contact your health care provider. EXAM: XR SPINE LUMBAR 3 VIEWS LOCATION: [facility] DATE: 9/19/2024 INDICATION: Hip Pain, Left COMPARISON: None.    Vertebral body heights are maintained. There is no evidence of acute fracture. Grade 1 anterolisthesis L4-L5. Mild intervertebral disc height loss at L4-L5. Scattered endplate and facet joint degenerative changes. There is atherosclerotic calcification of the abdominal aorta and bilateral hip joint osteoarthritis.        This note was dictated using voice recognition software. Any grammatical or context distortions are unintentional and inherent to the software.       Teresita LOZOYAP-C  [facility]  O. [phone]             Again, thank you for allowing me to participate in the care of your patient.        Sincerely,        BETINA Moore CNP

## 2024-10-07 ENCOUNTER — THERAPY VISIT (OUTPATIENT)
Dept: PHYSICAL THERAPY | Facility: CLINIC | Age: 69
End: 2024-10-07
Payer: COMMERCIAL

## 2024-10-07 DIAGNOSIS — M25.552 HIP PAIN, LEFT: ICD-10-CM

## 2024-10-07 DIAGNOSIS — M54.42 ACUTE LEFT-SIDED LOW BACK PAIN WITH LEFT-SIDED SCIATICA: ICD-10-CM

## 2024-10-07 PROCEDURE — 97110 THERAPEUTIC EXERCISES: CPT | Mod: GP

## 2024-10-07 PROCEDURE — 97161 PT EVAL LOW COMPLEX 20 MIN: CPT | Mod: GP

## 2024-10-07 ASSESSMENT — ACTIVITIES OF DAILY LIVING (ADL)
HOS_ADL_SCORE(%): 64.71
RECREATIONAL_ACTIVITIES: SLIGHT DIFFICULTY
SITTING_FOR_15_MINUTES: NO DIFFICULTY AT ALL
HOS_ADL_ITEM_SCORE_TOTAL: 44
WALKING_UP_STEEP_HILLS: SLIGHT DIFFICULTY
WALKING_APPROXIMATELY_10_MINUTES: SLIGHT DIFFICULTY
WALKING_DOWN_STEEP_HILLS: SLIGHT DIFFICULTY
HOS_ADL_COUNT: 17
GOING_UP_1_FLIGHT_OF_STAIRS: MODERATE DIFFICULTY
GETTING_INTO_AND_OUT_OF_A_BATHTUB: SLIGHT DIFFICULTY
STEPPING_UP_AND_DOWN_CURBS: SLIGHT DIFFICULTY
TWISTING/PIVOTING_ON_INVOLVED_LEG: SLIGHT DIFFICULTY
PUTTING_ON_SOCKS_AND_SHOES: SLIGHT DIFFICULTY
STANDING_FOR_15_MINUTES: SLIGHT DIFFICULTY
ROLLING_OVER_IN_BED: MODERATE DIFFICULTY
DEEP_SQUATTING: MODERATE DIFFICULTY
GOING_DOWN_1_FLIGHT_OF_STAIRS: MODERATE DIFFICULTY
GETTING_INTO_AND_OUT_OF_AN_AVERAGE_CAR: SLIGHT DIFFICULTY
LIGHT_TO_MODERATE_WORK: SLIGHT DIFFICULTY
HOS_ADL_HIGHEST_POTENTIAL_SCORE: 68
WALKING_15_MINUTES_OR_GREATER: MODERATE DIFFICULTY
HEAVY_WORK: MODERATE DIFFICULTY
WALKING_INITIALLY: MODERATE DIFFICULTY
HOW_WOULD_YOU_RATE_YOUR_CURRENT_LEVEL_OF_FUNCTION_DURING_YOUR_USUAL_ACTIVITIES_OF_DAILY_LIVING_FROM_0_TO_100_WITH_100_BEING_YOUR_LEVEL_OF_FUNCTION_PRIOR_TO_YOUR_HIP_PROBLEM_AND_0_BEING_THE_INABILITY_TO_PERFORM_ANY_OF_YOUR_USUAL_DAILY_ACTIVITIES?: 85

## 2024-10-07 NOTE — PROGRESS NOTES
PHYSICAL THERAPY EVALUATION  Type of Visit: Evaluation        Fall Risk Screen:  Have you fallen 2 or more times in the past year?: No  Have you fallen and had an injury in the past year?: No  Is patient a fall risk?: No    Subjective     Pt reports that her back was aggravated from doing a lot of walking  and driving but now does not bother her. Her main complaint is L hip pain that started about 2-3 months ago. Pain has been cronich of and on, was typically her R hip. Still has some decreased sensation above L knee. Most of hip pain is near greater trochanter. Has clicking in hip like your cracking you knuckle. No changes in B/B.    Vertigo has been getting better. Lying on one side (r side) bothers it.           Presenting condition or subjective complaint: arthritis in hip  Date of onset: 09/19/24    Relevant medical history: Arthritis; High blood pressure; Implanted device; Kidney disease; Menopause; Overweight; Severe dizziness   Dates & types of surgery: it's on MyChart    Prior diagnostic imaging/testing results: MRI; CT scan; X-ray   Hip MRI 9/19: 1.  Marrow edema in the acetabulum, possibly degenerative though a bone contusion or stress reaction is possible. No convincing fracture.  Lumbar MRI 9/19: 1.  At L4-L5 there is moderate central canal and bilateral lateral recess stenosis with moderate bilateral foraminal narrowing.  2.  At L3-L4 there is a left foraminal extrusion which encroaches on the exiting left L3 nerve root. There is mild to moderate left neural foraminal narrowing.  Prior therapy history for the same diagnosis, illness or injury: No      Prior Level of Function  Transfers: Independent  Ambulation: Independent  ADL: Independent  IADL: Driving, Housekeeping, Laundry, Meal preparation, Work    Living Environment  Social support: With a significant other or spouse   Type of home: House   Stairs to enter the home: Yes 4 Is there a railing: Yes     Ramp: No   Stairs inside the home: No      "  Help at home: None  Equipment owned:       Employment: Yes   Hobbies/Interests:  [place], social[place]    Patient goals for therapy: walk without pain    Pain assessment: See objective evaluation for additional pain details     Objective   LUMBAR SPINE EVALUATION  PAIN: Pain Level at Rest: 0/10  Pain Level with Use: 4/10  Pain Location: hip and lateral  Pain Quality: pressure \"like someone's knuckle is in you\"  Pain Frequency: intermittent  Pain is Worst: daytime  Pain is Exacerbated By: worse with walking or standing, lifting leg, stairs, getting into the car  Pain is Relieved By: NSAIDs and heat, Voltaren, biofreeze  Pain Progression: Improved    INTEGUMENTARY (edema, incisions): WNL    POSTURE:  forward head; slumped sitting posture    GAIT:   Weightbearing Status:  FWB  Assistive Device(s): None  Gait Deviations: Antalgic    BALANCE/PROPRIOCEPTION:  2 sec L, limited by pain; 5 sec R    ROM:   (Degrees) Left AROM Left PROM  Right AROM Right PROM   Hip Flexion ~60 degrees +pain WNL +pain  WNL   Hip Extension WNL +pain      Hip Internal Rotation  WNL +pain  WNL   Hip External Rotation  WNL +pain  WNL   Lumbar Side bend WNL B    Lumbar Flexion WNL   Lumbar Extension WNL + pain in anterior L hip   Pain:   End feel:     STRENGTH:   Hip flexion:  R 4/5    L 3+/5 +pain  Knee extension: R 5/5   L 5/5 +pain  Knee flexion: 5/5 B  Hip ER: R 4+/5    L 4-/5 +pain  Hip IR:  R 4+/5     L 4+/5 +pain  DF: 5/5 B      NEURAL TENSION:    Left Right   Slump Positive Negative      LUMBAR/HIP Special Tests:    Left Right   TOBY Positive Pain in L hip   FADIR/Labrum/JAIR Positive Negative    Slump Positive Negative    Scour Positive Negative       FUNCTIONAL TESTS:  Sit to stand: able to stand without use of UE and no pain    PALPATION:  palpation near L greater trochanter caused tenderness in joint line, minimal tenderness to L glutes      Assessment & Plan   CLINICAL IMPRESSIONS  Medical Diagnosis: Hip " pain, left; Acute left-sided low back pain with left-sided sciatica    Treatment Diagnosis: L hip pain with muscle power and mobility deficits   Impression/Assessment: Patient is a 69 year old female with L hip pain complaints.  The following significant findings have been identified: Pain, Decreased ROM/flexibility, Decreased strength, Impaired balance, Impaired gait, Impaired muscle performance, and Decreased activity tolerance. These impairments interfere with their ability to perform self care tasks, work tasks, recreational activities, household chores, driving , and community mobility as compared to previous level of function.     Clinical Decision Making (Complexity):  Clinical Presentation: Stable/Uncomplicated  Clinical Presentation Rationale: based on medical and personal factors listed in PT evaluation  Clinical Decision Making (Complexity): Low complexity    PLAN OF CARE  Treatment Interventions:  Modalities: Cryotherapy, Dry Needling, Hot Pack  Interventions: Gait Training, Manual Therapy, Neuromuscular Re-education, Therapeutic Activity, Therapeutic Exercise, Self-Care/Home Management    Long Term Goals     PT Goal 1  Goal Identifier: ADLs  Goal Description: Pt will be able to complete all daily activites with minimal to no L hip pain  Rationale: to maximize safety and independence with performance of ADLs and functional tasks  Target Date: 12/07/24  PT Goal 2  Goal Identifier: Walking  Goal Description: Pt will be able to walk for >15 minutes with minimal to no L hip pain  Rationale: to maximize safety and independence within the community  Target Date: 12/07/24      Frequency of Treatment: 1x/week  Duration of Treatment: 2 months    Recommended Referrals to Other Professionals: Physical Therapy  Education Assessment:   Learner/Method: Patient    Risks and benefits of evaluation/treatment have been explained.   Patient/Family/caregiver agrees with Plan of Care.     Evaluation Time:     PT Eval, Low  Complexity Minutes (90524): 25    I attest to the information in this note. Therapy services were provided with the co-signing licensed therapist guiding and directing the services, and providing the skilled judgement and assessment throughout the session.     Signing Clinician: Helga Jones PT        [facility]                                                                                   OUTPATIENT PHYSICAL THERAPY      PLAN OF TREATMENT FOR OUTPATIENT REHABILITATION   Patient's Last Name, First Name, Theodora Rodriguez YOB: 1955   Provider's Name   [facility]   Medical Record No.  [id number]     Onset Date: 09/19/24  Start of Care Date: 10/07/24     Medical Diagnosis:  Hip pain, left; Acute left-sided low back pain with left-sided sciatica      PT Treatment Diagnosis:  L hip pain with muscle power and mobility deficits Plan of Treatment  Frequency/Duration: 1x/week/ 2 months    Certification date from 10/07/24 to 12/07/24         See note for plan of treatment details and functional goals     Helga Jones PT                         I CERTIFY THE NEED FOR THESE SERVICES FURNISHED UNDER        THIS PLAN OF TREATMENT AND WHILE UNDER MY CARE .             Physician Signature               Date    X_____________________________________________________                  Referring Provider:  Teresita Alves    Initial Assessment  See Epic Evaluation- Start of Care Date: 10/07/24

## 2024-10-10 ENCOUNTER — OFFICE VISIT (OUTPATIENT)
Dept: FAMILY MEDICINE | Facility: CLINIC | Age: 69
End: 2024-10-10
Payer: COMMERCIAL

## 2024-10-10 VITALS
DIASTOLIC BLOOD PRESSURE: 70 MMHG | RESPIRATION RATE: 20 BRPM | WEIGHT: 246 LBS | HEART RATE: 73 BPM | TEMPERATURE: 97.9 F | SYSTOLIC BLOOD PRESSURE: 134 MMHG | HEIGHT: 60 IN | OXYGEN SATURATION: 98 % | BODY MASS INDEX: 48.29 KG/M2

## 2024-10-10 DIAGNOSIS — M25.552 HIP PAIN, LEFT: ICD-10-CM

## 2024-10-10 DIAGNOSIS — R42 DIZZINESS: Primary | ICD-10-CM

## 2024-10-10 PROCEDURE — 90662 IIV NO PRSV INCREASED AG IM: CPT | Performed by: PHYSICIAN ASSISTANT

## 2024-10-10 PROCEDURE — 90480 ADMN SARSCOV2 VAC 1/ONLY CMP: CPT | Performed by: PHYSICIAN ASSISTANT

## 2024-10-10 PROCEDURE — 99213 OFFICE O/P EST LOW 20 MIN: CPT | Mod: 25 | Performed by: PHYSICIAN ASSISTANT

## 2024-10-10 PROCEDURE — 91320 SARSCV2 VAC 30MCG TRS-SUC IM: CPT | Performed by: PHYSICIAN ASSISTANT

## 2024-10-10 PROCEDURE — G0008 ADMIN INFLUENZA VIRUS VAC: HCPCS | Performed by: PHYSICIAN ASSISTANT

## 2024-10-10 NOTE — PROGRESS NOTES
/  Assessment & Plan     Dizziness  Improving. Monitor for now. If returns reach out. Trial of debrox as needed with ear wax.     Hip pain, left  Managed through PM&R and physical therapy. Follow up if worsening or not improving as expected.         MED REC REQUIRED  Post Medication Reconciliation Status: patient was not discharged from an inpatient facility or TCU  BMI  Estimated body mass index is 48.04 kg/m  as calculated from the following:    Height as of this encounter: 1.524 m (5').    Weight as of this encounter: 111.6 kg (246 lb).         The longitudinal plan of care for the diagnosis(es)/condition(s) as documented were addressed during this visit. Due to the added complexity in care, I will continue to support Prema in the subsequent management and with ongoing continuity of care.        Pawel Lloyd is a 69 year old, presenting for the following health issues:  ER F/U        10/10/2024     8:14 AM   Additional Questions   Roomed by miguelina SELBY     Patient is a 69 year old female who is following up from an ED visit on 9/19/24 for hip pain and 9/27/24 for dizziness.   The hip pain was determined to be due to bilateral hip osteoarthritis and anterolisthesis of L4-L5. She started physical therapy for this on 10/7/24 with a mild exacerbation in pain, however states the pain is overall improved since the ED visit.  The dizziness was determined to be due to vertigo. She was given meclizine 25 mg with significant improvement in her symptoms. She has only experienced ~1 episode per week since the ED visit. Notes 1 episode of brief dizziness this morning after standing up and walking several steps, which has resolved. She denies persistent dizziness, headaches, hearing changes, or vision changes.      ED/UC Followup:    Facility:  [facility] Emergency Department   Date of visit: 9/27/2024 and 9/19/2024  Reason for visit: dizzy  Current Status: pt stated that it is slowly going away. While  getting up this morning she felt a little dizzy, but reports things are getting better.                  Objective    /70   Pulse 73   Temp 97.9  F (36.6  C) (Temporal)   Resp 20   Ht 1.524 m (5')   Wt 111.6 kg (246 lb)   LMP  (LMP Unknown)   SpO2 98%   BMI 48.04 kg/m    Body mass index is 48.04 kg/m .  Physical Exam   GENERAL: alert and no distress  EYES: Eyes grossly normal to inspection, PERRL and conjunctivae and sclerae normal  HENT: Right EAC with mild cerumen, unable to visualize TM. Left EAC with mild cerumen and normal TM. nose and mouth without ulcers or lesions  NECK: no adenopathy, no asymmetry, masses, or scars  RESP: lungs clear to auscultation - no rales, rhonchi or wheezes  CV: regular rate and rhythm, normal S1 S2, no S3 or S4, no murmur, click or rub  MS: no gross musculoskeletal defects noted  NEURO: mentation intact and speech normal, CN grossly normal.             Signed Electronically by: ANJALI Cody PA-S  The student LIZZIE Russell acted as a scribe and the encounter documented above was completely performed by myself and the documentation reflects the work I have performed today.   Shirley Warner PA-C

## 2024-10-25 ENCOUNTER — THERAPY VISIT (OUTPATIENT)
Dept: PHYSICAL THERAPY | Facility: CLINIC | Age: 69
End: 2024-10-25
Payer: COMMERCIAL

## 2024-10-25 DIAGNOSIS — M25.552 HIP PAIN, LEFT: Primary | ICD-10-CM

## 2024-10-25 DIAGNOSIS — M54.42 ACUTE LEFT-SIDED LOW BACK PAIN WITH LEFT-SIDED SCIATICA: ICD-10-CM

## 2024-10-25 PROCEDURE — 97140 MANUAL THERAPY 1/> REGIONS: CPT | Mod: GP

## 2024-10-25 PROCEDURE — 97110 THERAPEUTIC EXERCISES: CPT | Mod: GP

## 2024-11-01 ENCOUNTER — THERAPY VISIT (OUTPATIENT)
Dept: PHYSICAL THERAPY | Facility: CLINIC | Age: 69
End: 2024-11-01
Attending: NURSE PRACTITIONER
Payer: COMMERCIAL

## 2024-11-01 DIAGNOSIS — M25.552 HIP PAIN, LEFT: Primary | ICD-10-CM

## 2024-11-01 DIAGNOSIS — M54.42 ACUTE LEFT-SIDED LOW BACK PAIN WITH LEFT-SIDED SCIATICA: ICD-10-CM

## 2024-11-01 PROCEDURE — 97110 THERAPEUTIC EXERCISES: CPT | Mod: GP

## 2024-11-01 PROCEDURE — 97140 MANUAL THERAPY 1/> REGIONS: CPT | Mod: GP

## 2024-11-08 ENCOUNTER — THERAPY VISIT (OUTPATIENT)
Dept: PHYSICAL THERAPY | Facility: CLINIC | Age: 69
End: 2024-11-08
Attending: NURSE PRACTITIONER
Payer: COMMERCIAL

## 2024-11-08 DIAGNOSIS — M25.552 HIP PAIN, LEFT: Primary | ICD-10-CM

## 2024-11-08 DIAGNOSIS — M54.42 ACUTE LEFT-SIDED LOW BACK PAIN WITH LEFT-SIDED SCIATICA: ICD-10-CM

## 2024-11-08 PROCEDURE — 97140 MANUAL THERAPY 1/> REGIONS: CPT | Mod: GP

## 2024-11-08 PROCEDURE — 97110 THERAPEUTIC EXERCISES: CPT | Mod: GP

## 2024-11-08 PROCEDURE — 97112 NEUROMUSCULAR REEDUCATION: CPT | Mod: GP

## 2024-11-15 ENCOUNTER — THERAPY VISIT (OUTPATIENT)
Dept: PHYSICAL THERAPY | Facility: CLINIC | Age: 69
End: 2024-11-15
Attending: NURSE PRACTITIONER
Payer: COMMERCIAL

## 2024-11-15 DIAGNOSIS — M54.42 ACUTE LEFT-SIDED LOW BACK PAIN WITH LEFT-SIDED SCIATICA: ICD-10-CM

## 2024-11-15 DIAGNOSIS — M25.552 HIP PAIN, LEFT: Primary | ICD-10-CM

## 2024-11-15 PROCEDURE — 97110 THERAPEUTIC EXERCISES: CPT | Mod: GP

## 2024-12-02 ENCOUNTER — THERAPY VISIT (OUTPATIENT)
Dept: PHYSICAL THERAPY | Facility: CLINIC | Age: 69
End: 2024-12-02
Payer: COMMERCIAL

## 2024-12-02 DIAGNOSIS — M25.552 HIP PAIN, LEFT: Primary | ICD-10-CM

## 2024-12-02 DIAGNOSIS — M54.42 ACUTE LEFT-SIDED LOW BACK PAIN WITH LEFT-SIDED SCIATICA: ICD-10-CM

## 2024-12-02 PROCEDURE — 97110 THERAPEUTIC EXERCISES: CPT | Mod: GP

## 2024-12-02 PROCEDURE — 97140 MANUAL THERAPY 1/> REGIONS: CPT | Mod: GP

## 2024-12-02 ASSESSMENT — ACTIVITIES OF DAILY LIVING (ADL)
HOW_WOULD_YOU_RATE_YOUR_CURRENT_LEVEL_OF_FUNCTION_DURING_YOUR_USUAL_ACTIVITIES_OF_DAILY_LIVING_FROM_0_TO_100_WITH_100_BEING_YOUR_LEVEL_OF_FUNCTION_PRIOR_TO_YOUR_HIP_PROBLEM_AND_0_BEING_THE_INABILITY_TO_PERFORM_ANY_OF_YOUR_USUAL_DAILY_ACTIVITIES?: 75
ADL_HIGHEST_POTENTIAL_SCORE: 68
ROLLING_OVER_IN_BED: SLIGHT DIFFICULTY
LIGHT_TO_MODERATE_WORK: SLIGHT DIFFICULTY
ADL_COUNT: 17
GOING UP 1 FLIGHT OF STAIRS: MODERATE DIFFICULTY
TWISTING/PIVOTING ON INVOLVED LEG: MODERATE DIFFICULTY
HOS_ADL_ITEM_SCORE_TOTAL: 33
ADL_SCORE: 0
HEAVY_WORK: MODERATE DIFFICULTY
WALKING_INITIALLY: SLIGHT DIFFICULTY
GETTING_INTO_AND_OUT_OF_A_BATHTUB: SLIGHT DIFFICULTY
RECREATIONAL ACTIVITIES: MODERATE DIFFICULTY
TWISTING/PIVOTING_ON_INVOLVED_LEG: MODERATE DIFFICULTY
STEPPING UP AND DOWN CURBS: SLIGHT DIFFICULTY
SITTING FOR 15 MINUTES: NO DIFFICULTY AT ALL
HOS_ADL_HIGHEST_POTENTIAL_SCORE: 52
WALKING_APPROXIMATELY_10_MINUTES: MODERATE DIFFICULTY
GETTING INTO AND OUT OF AN AVERAGE CAR: SLIGHT DIFFICULTY
STEPPING_UP_AND_DOWN_CURBS: SLIGHT DIFFICULTY
GETTING_INTO_AND_OUT_OF_AN_AVERAGE_CAR: SLIGHT DIFFICULTY
PUTTING_ON_SOCKS_AND_SHOES: SLIGHT DIFFICULTY
GOING_UP_1_FLIGHT_OF_STAIRS: MODERATE DIFFICULTY
STANDING_FOR_15_MINUTES: SLIGHT DIFFICULTY
STANDING FOR 15 MINUTES: SLIGHT DIFFICULTY
LIGHT_TO_MODERATE_WORK: SLIGHT DIFFICULTY
RECREATIONAL_ACTIVITIES: MODERATE DIFFICULTY
GOING DOWN 1 FLIGHT OF STAIRS: MODERATE DIFFICULTY
GETTING_INTO_AND_OUT_OF_A_BATHTUB: SLIGHT DIFFICULTY
WALKING_INITIALLY: SLIGHT DIFFICULTY
GOING_DOWN_1_FLIGHT_OF_STAIRS: MODERATE DIFFICULTY
PUTTING ON SOCKS AND SHOES: SLIGHT DIFFICULTY
HOW_WOULD_YOU_RATE_YOUR_CURRENT_LEVEL_OF_FUNCTION_DURING_YOUR_USUAL_ACTIVITIES_OF_DAILY_LIVING_FROM_0_TO_100_WITH_100_BEING_YOUR_LEVEL_OF_FUNCTION_PRIOR_TO_YOUR_HIP_PROBLEM_AND_0_BEING_THE_INABILITY_TO_PERFORM_ANY_OF_YOUR_USUAL_DAILY_ACTIVITIES?: 75
WALKING_FOR_APPROXIMATELY_10_MINUTES: MODERATE DIFFICULTY
ROLLING OVER IN BED: SLIGHT DIFFICULTY
HEAVY_WORK: MODERATE DIFFICULTY
ADL_TOTAL_ITEM_SCORE: 0
SITTING_FOR_15_MINUTES: NO DIFFICULTY AT ALL

## 2024-12-02 NOTE — PROGRESS NOTES
[facility]                                                                                   OUTPATIENT PHYSICAL THERAPY    PLAN OF TREATMENT FOR OUTPATIENT REHABILITATION   Patient's Last Name, First Name, Theodora Rodriguez YOB: 1955   Provider's Name   [facility]   Medical Record No.  [id number]     Onset Date: 09/19/24  Start of Care Date: 10/07/24     Medical Diagnosis:  Hip pain, left; Acute left-sided low back pain with left-sided sciatica      PT Treatment Diagnosis:  L hip pain with muscle power and mobility deficits Plan of Treatment  Frequency/Duration: 1x/week/ 2 months    Certification date from 12/02/24 to 02/02/25         See note for plan of treatment details and functional goals     Helga Jones PT                         I CERTIFY THE NEED FOR THESE SERVICES FURNISHED UNDER        THIS PLAN OF TREATMENT AND WHILE UNDER MY CARE     (Physician attestation of this document indicates review and certification of the therapy plan).              Referring Provider:  Teresita Alves    Initial Assessment  See Epic Evaluation- Start of Care Date: 10/07/24        ASSESSMENT   Pt has attended 7 visits of PT over 7 weeks and notes some improvements (can transfer into cars easier, not having as much back pain), but continues to be limited by right hip pain. Localized to groin with symptoms most consistent to intra-articular pain 2/2 degenerative changes. Pain limits walking tolerance, stair climbing, and overall activity tolerance. Pt would continue to benefit from PT to address remaining impairments and promote full return to function.       PLAN  Continue therapy per current plan of care.    Beginning/End Dates of Progress Note Reporting Period:  10/07/24 to 12/02/2024    Referring Provider:  Teresita Alves     12/02/24 0500   Appointment Info   Signing clinician's name / credentials Helga Jones PT DPT  OCS   Total/Authorized Visits E&T   Visits Used 7   Medical Diagnosis Hip pain, left; Acute left-sided low back pain with left-sided sciatica   PT Tx Diagnosis L hip pain with muscle power and mobility deficits   Precautions/Limitations Vertigo   Quick Adds [facility] Auth & Certification   Progress Note/Certification   Start of Care Date 10/07/24   Onset of illness/injury or Date of Surgery 09/19/24   Therapy Frequency 1x/week   Predicted Duration 2 months   Certification date from 12/02/24   Certification date to 02/02/25   Progress Note Completed Date 10/07/24   [facility] Authorization Information   Condition type Recurrent (multiple episodes of < 3 months)   Cause of current episode Repetitive   Nature of treatment Rehabilitative   Functional ability Moderate functional limitations   Documented POC (choose all that apply) Measurable short and long term/discharge treatment goals related to physical and functional deficits.;Frequency of treatment visits and treatment activities to address deficit areas.;Patient agrees to program participation including home program   Briefly describe symptoms left hip pain that feels pin point at the lateral hip; pain limits tolerance to walking & long duration ADLs   How did the symptoms start increase in walking, ladders, driving low car   Average pain/intensity last 24 hours 2/10   Average pain/intensity past week 3/10   Frequency of Symptoms Occasionally (26-50% of the time)   Symptom impact on ADLs Moderately   Condition change since eval Better   General health reported by patient Good   Supervision   Student Supervision Direct supervision provided;Therapy services provided with the co-signing licensed therapist guiding and directing the services, and providing the skilled judgement and assessment throughout the session   GOALS   PT Goals 3   PT Goal 1   Goal Identifier ADLs   Goal Description Pt will be able to complete all daily activites with minimal to no L hip pain   Rationale to  "maximize safety and independence with performance of ADLs and functional tasks   Goal Progress can complete ADLs but has to slow down or modify due to L hip pain, extend   Target Date 02/02/25   PT Goal 2   Goal Identifier Walking   Goal Description Pt will be able to walk for >15 minutes with minimal to no L hip pain   Rationale to maximize safety and independence within the community   Goal Progress 10 min tolerance (improved), extend goal   Target Date 02/02/25   PT Goal 3   Goal Identifier Transfers   Goal Description Pt will be able to transfer in & out of bed without limitation from Left hip pain   Rationale to maximize safety and independence with self cares;to maximize safety and independence within the home   Goal Progress currently lifting the leg into bed increases pain quite a bit   Target Date 02/02/25   Subjective Report   Subjective Report She reports doing alright, every day is different and sometimes the hip is very sore, sometimes it is good. Pain has settled into the \"center of the hip ball\" points to anterior/groin region. Still limping every day especially in the afternoon. Getting in & out of cars is easier, but into bed (lifting the left leg out to the side) is very painful. Knees are sore now with the cold weather.   Objective Measure 1   Objective Measure Left hip PROM   Details Flexion ~110 +pain, abduction ~20++pain, unable to test ER & IR due to pain   Objective Measure 2   Objective Measure gait   Details antalgic with compensated trendelenburg; no AD   Treatment Interventions (PT)   Interventions Therapeutic Procedure/Exercise   Therapeutic Procedure/Exercise   Therapeutic Procedures: strength, endurance, ROM, flexibility minutes (64527) 25   Ther Proc 1 NuStep   PTRx Ther Proc 1 Forward Step   PTRx Ther Proc 1 - Details x8 Left leg on 6\" step with hand on counter add to HEP   PTRx Ther Proc 2 Squat   PTRx Ther Proc 2 - Details 2x 10 adjusting form to increase hip hinge/decrease knee " flexion - less pain in knees with modifications   PTRx Ther Proc 3 Hip AROM Standing Abduction   PTRx Ther Proc 3 - Details x10 on L cues for slower pacing able to tolerate straight plane abduction (not diagonal)   PTRx Ther Proc 4 Hip AROM Standing Extension   PTRx Ther Proc 4 - Details x10 on L cues for slower pacing able to tolerate straight plane extension (not diagonal)   PTRx Ther Proc 5 Sidestep   PTRx Ther Proc 5 - Details VR- continue per HEP; small steps   PTRx Ther Proc 6 Roll Outs    PTRx Ther Proc 6 - Details blue TB reviewed set-up (margarito-cross) x6 with brief hold   Skilled Intervention exercise modification/progression; explained rationale & dosage   Patient Response/Progress intermittent soreness overall tolerates well   PTRx Ther Proc 7 Roll Ins   PTRx Ther Proc 7 - Details reviewed - sometimes does with hands as resistance continue per HEP   PTRx Ther Proc 8 Short Arc Knee Extension   PTRx Ther Proc 8 - Details x10 L reviewed form likely add resistance next   Manual Therapy   Manual Therapy: Mobilization, MFR, MLD, friction massage minutes (96716) 13   Manual Therapy 1 long axis hip distraction   Manual Therapy 1 - Details L leg 3x 3 mins with belt assist   Manual Therapy 2 hip circumduction   Manual Therapy 2 - Details x20 each way on L leg   Skilled Intervention joint techniques to decrease pain   Patient Response/Progress reports relief during   Education   Learner/Method Patient   Plan   Home program Printout   Updates to plan of care removed sit to stands (knee pain); progressed balance drills   Plan for next session progress ROM and glute strength if needed; joint mobs long axis and lateral; gait training   Total Session Time   Timed Code Treatment Minutes 38   Total Treatment Time (sum of timed and untimed services) 38

## 2024-12-09 ENCOUNTER — THERAPY VISIT (OUTPATIENT)
Dept: PHYSICAL THERAPY | Facility: CLINIC | Age: 69
End: 2024-12-09
Payer: COMMERCIAL

## 2024-12-09 DIAGNOSIS — M25.552 HIP PAIN, LEFT: Primary | ICD-10-CM

## 2024-12-09 DIAGNOSIS — M54.42 ACUTE LEFT-SIDED LOW BACK PAIN WITH LEFT-SIDED SCIATICA: ICD-10-CM

## 2024-12-09 PROCEDURE — 97110 THERAPEUTIC EXERCISES: CPT | Mod: GP

## 2024-12-24 ENCOUNTER — THERAPY VISIT (OUTPATIENT)
Dept: PHYSICAL THERAPY | Facility: CLINIC | Age: 69
End: 2024-12-24
Payer: COMMERCIAL

## 2024-12-24 DIAGNOSIS — M25.552 HIP PAIN, LEFT: Primary | ICD-10-CM

## 2024-12-24 DIAGNOSIS — M54.42 ACUTE LEFT-SIDED LOW BACK PAIN WITH LEFT-SIDED SCIATICA: ICD-10-CM

## 2024-12-24 PROCEDURE — 97110 THERAPEUTIC EXERCISES: CPT | Mod: GP

## 2025-01-17 ENCOUNTER — THERAPY VISIT (OUTPATIENT)
Dept: PHYSICAL THERAPY | Facility: CLINIC | Age: 70
End: 2025-01-17
Payer: COMMERCIAL

## 2025-01-17 DIAGNOSIS — M54.42 ACUTE LEFT-SIDED LOW BACK PAIN WITH LEFT-SIDED SCIATICA: ICD-10-CM

## 2025-01-17 DIAGNOSIS — M25.552 HIP PAIN, LEFT: Primary | ICD-10-CM

## 2025-01-17 PROCEDURE — 97140 MANUAL THERAPY 1/> REGIONS: CPT | Mod: GP

## 2025-01-17 PROCEDURE — 97110 THERAPEUTIC EXERCISES: CPT | Mod: GP

## 2025-03-03 ENCOUNTER — PATIENT OUTREACH (OUTPATIENT)
Dept: CARE COORDINATION | Facility: CLINIC | Age: 70
End: 2025-03-03
Payer: COMMERCIAL

## 2025-03-05 ENCOUNTER — PATIENT OUTREACH (OUTPATIENT)
Dept: CARE COORDINATION | Facility: CLINIC | Age: 70
End: 2025-03-05
Payer: COMMERCIAL

## 2025-03-14 NOTE — PROGRESS NOTES
[facility]  Orthopaedic Surgery, Joint Replacement Consultation  by Jackson Arnold M.D.    Theodora Hamilton MRN# [id number]    YOB: 1955     Requesting physician: hSirley Fernandez            Assessment and Plan:   Assessment:  Prema is a 70-year-old female with left hip pain early left hip osteoarthritis.  We discussed that her radiographic findings demonstrate early degenerative changes of the left hip.  We discussed nonoperative treatment options and recommended utilization of pool therapy.  We discussed that focusing on weight loss would be beneficial for her if she decides to undergo total hip arthroplasty in the future.  She would like to avoid surgery at this time, and we will continue with nonoperative management for the time being.  Discussed recommendation for referral for bariatric clinic.  Patient would like to do her own weight loss at this time.  She will let us know if she needs a referral in the future.     Plan:  Return to clinic as needed    Elia Donohue MD  Orthopaedic Surgery, PGY-4        Attending MD (Dr. Jackson Arnold) Attestation :  This patient was seen and evaluated by me including a history, exam, and interpretation of all imaging and/or lab data.  I formulated the treatment plan along with either a physician's assistant (PACarlosC) or training physician (resident/fellow), who also saw the patient. That individual or a scribe has documented the visit in the attached note which I approve.    MD Vicente Paniagua Professor  Oncology and Adult Reconstructive Surgery  Dept Orthopaedic Surgery, [facility]  [phone] office, [phone] pager  [email or web address]           For additional information and frequently asked questions regarding joint replacements, scan the QR code image below on your phone camera or go to:  [email or web address]        This note was created using dictation  software and may contain errors.  Please contact the creator for any clarifications that are needed.           History of Present Illness:   70 year old female  chief complaint  Presenting to clinic for evaluation of left hip pain.  She states that she has been having longstanding left hip pain over the past year.  She reports that the pain is mainly in the anterior aspect of the hip.  She reports that she had radiating symptoms down the leg in the past with associated numbness and tingling.  These have subsided over time.  She reports that the pain is limiting her activities of daily living including going up and down the stairs.  She has been using a cane as an assistive device.  She has been using Tylenol and ibuprofen for pain control.  She has tried working with physical therapy.  She denies any other concerns at this time.    Current symptoms:  Problem: Left hip pain   Onset and duration: August 2024  Awakens from sleep due to sx's:  Yes  Precipitating Injury:  No    Other joints or sites painful:  No  Prior treatments:  Non steroidal anti-inflammatory agents or aspirin: Yes  Reduced activity:  Yes  Physical therapy:  Yes  Chiropractic care:  No  Injections with either steroid or viscosupplementation agents:  Yes   Last steroid injection on 1/24/25 with excellent relief.         Background history:  DX:  HLD  Vitamin D deficiency  HTN  CKD 3 (Cr 1.22)  BMI 47.85               Physical Exam:     EXAMINATION pertinent findings:   PSYCH: Pleasant, healthy-appearing, alert, oriented x3, cooperative. Normal mood and affect.  VITAL SIGNS: not currently breastfeeding..  Reviewed nursing intake notes.   There is no height or weight on file to calculate BMI.  RESP: non labored breathing   ABD: benign, soft, non-tender, no acute peritoneal findings  SKIN: grossly normal   LYMPHATIC: grossly normal, no adenopathy, no extremity edema  NEURO: grossly normal , no motor deficits  VASCULAR: satisfactory perfusion of all  extremities   MUSCULOSKELETAL:   Gait: Antalgic  Hips:   L hip: ROM  FF 90 , IRF 10 , ERF 30 , ABD 30    R hip: ROM   ,IRF 10 , ERF 30 , ABD 30    Left hip pain with flexion and internal rotation.   Negative straight leg raise.  Knees:   Bilateral knee range of motion supple with no pain.              Data:   All laboratory data reviewed  All imaging studies reviewed by me    Imaging tests:    Reviewed x-ray of the left hip obtained in clinic today:  My interpretation: This demonstrates early degenerative changes in bilateral hips.  This is associated with mild subchondral sclerosis.  There is preserved joint space bilaterally.  There is evidence of mild osteophyte formation.  No other acute osseous abnormalities.    Reviewed MRI of the left hip obtained on 9/20/2024:  My interpretation: This demonstrates relatively preserved cartilage throughout the left hip with mild loss in the anterior superior head.  There is chronic labral tear.  There is some mild bony edema in the acetabulum.        DATA for DOCUMENTATION:         Past Medical History:     Patient Active Problem List   Diagnosis    Asymptomatic postmenopausal status    OA (Osteoarthritis) - left great toe    HYPERLIPIDEMIA LDL GOAL <160    Morbid obesity (H)    Essential hypertension    Vitamin D deficiency    CKD (chronic kidney disease) stage 3, GFR 30-59 ml/min (H)    Hip pain, left    Acute left-sided low back pain with left-sided sciatica     Past Medical History:   Diagnosis Date    Arthritis     CKD (chronic kidney disease) stage 3, GFR 30-59 ml/min (H)     Essential hypertension 11/13/2020    Vitamin D deficiency 08/04/2023       Also see scanned health assessment forms.       Past Surgical History:     Past Surgical History:   Procedure Laterality Date    COLONOSCOPY      COLONOSCOPY N/A 11/15/2019    Procedure: COLONOSCOPY, WITH POLYPECTOMY AND BIOPSY;  Surgeon: Jacob Nieves MD;  Location:  OR    ESOPHAGOSCOPY, GASTROSCOPY,  DUODENOSCOPY (EGD), COMBINED N/A 11/20/2023    Procedure: ESOPHAGOGASTRODUODENOSCOPY, WITH BIOPSY;  Surgeon: Forrest Villanueva MD;  Location: [facility] OR    INCISION AND DRAINAGE FINGER, COMBINED Left 7/9/2019    Procedure: Incision and Drainage of Left Index Finger;  Surgeon: Lowell Whelan MD;  Location: [facility] OR    NO HISTORY OF SURGERY              Social History:     Social History     Socioeconomic History    Marital status: Single     Spouse name: Not on file    Number of children: 0    Years of education: Not on file    Highest education level: Not on file   Occupational History     Employer: [employer]   Tobacco Use    Smoking status: Former     Current packs/day: 0.00     Types: Cigarettes     Passive exposure: Past    Smokeless tobacco: Never   Vaping Use    Vaping status: Never Used   Substance and Sexual Activity    Alcohol use: Yes     Comment: 5 times per year    Drug use: No    Sexual activity: Not Currently     Partners: Female   Other Topics Concern    Parent/sibling w/ CABG, MI or angioplasty before 65F 55M? No   Social History Narrative    Not on file     Social Drivers of Health     Financial Resource Strain: Low Risk  (2/1/2025)    Received from [facility]    Financial Resource Strain     Difficulty of Paying Living Expenses: 3     Difficulty of Paying Living Expenses: Not on file   Food Insecurity: No Food Insecurity (9/19/2024)    Received from [facility]    Food Insecurity     Do you worry your food will run out before you are able to buy more?: 1   Transportation Needs: No Transportation Needs (9/19/2024)    Received from [facility]    Transportation Needs     Does lack of transportation keep you from medical appointments?: 1     Does lack of transportation keep you from work, meetings or getting things that you need?: 1   Physical Activity: Insufficiently Active  (5/20/2024)    Exercise Vital Sign     Days of Exercise per Week: 1 day     Minutes of Exercise per Session: 30 min   Stress: No Stress Concern Present (5/20/2024)    [employer] [place] [employer] - Occupational Stress Questionnaire     Feeling of Stress : Not at all   Social Connections: Socially Integrated (9/19/2024)    [facility]    Social Connections     Do you often feel lonely or isolated from those around you?: 0   Interpersonal Safety: Low Risk  (5/20/2024)    Interpersonal Safety     Do you feel physically and emotionally safe where you currently live?: Yes     Within the past 12 months, have you been hit, slapped, kicked or otherwise physically hurt by someone?: No     Within the past 12 months, have you been humiliated or emotionally abused in other ways by your partner or ex-partner?: No   Housing Stability: Low Risk  (9/19/2024)    [facility]    Housing Stability     What is your housing situation today?: 1            Family History:       Family History   Problem Relation Age of Onset    C.A.ELY. Maternal Grandmother     C.A.D. Maternal Grandfather         17 MI's per history    C.AYURY Paternal Grandmother     Diabetes Paternal Grandmother     C.AROSALINDA. Paternal Grandfather     Diabetes Paternal Grandfather     Hypertension Mother     C.A.D. Mother         pacemaker    Circulatory Father         Carotid artery stenosis    Respiratory Father 75        COPD    Coronary Artery Disease Father     Substance Abuse Father     Diabetes Paternal Aunt     Diabetes Paternal Uncle     Coronary Artery Disease Brother     Breast Cancer Cousin     Asthma No family hx of     Cerebrovascular Disease No family hx of     Cancer - colorectal No family hx of     Prostate Cancer No family hx of             Medications:     Current Outpatient Medications   Medication Sig Dispense Refill    amLODIPine (NORVASC) 2.5 MG tablet  Take 1 tablet (2.5 mg) by mouth daily 90 tablet 3    atorvastatin (LIPITOR) 10 MG tablet Take 1 tablet (10 mg) by mouth daily 90 tablet 3    diphenhydrAMINE (BENADRYL) 25 MG tablet Take 25-50 mg by mouth daily as needed for allergies      lisinopril (ZESTRIL) 40 MG tablet Take 1 tablet (40 mg) by mouth daily 90 tablet 3    omeprazole (PRILOSEC) 40 MG DR capsule Take 1 capsule (40 mg) by mouth daily 90 capsule 3    vitamin D3 (CHOLECALCIFEROL) 50 mcg (2000 units) tablet Take 1 tablet (50 mcg) by mouth daily      alum & mag hydroxide-simethicone (MYLANTA/MAALOX) 200-200-20 MG/5ML SUSP suspension Take 30 mLs by mouth once as needed for indigestion or heartburn (uses 2-3 times per week) (Patient not taking: Reported on 7/1/2024)      docusate sodium (COLACE) 100 MG capsule Take 1 capsule (100 mg) by mouth 2 times daily. (Patient not taking: Reported on 3/27/2025) 30 capsule 0    meclizine (ANTIVERT) 25 MG tablet Take 1 tablet 3 times daily for the next 3 days then take 1 tablet 3 times daily as needed for persistent dizziness (Patient not taking: Reported on 3/27/2025) 21 tablet 0     No current facility-administered medications for this visit.              Review of Systems:   A comprehensive 10 point review of systems (constitutional, ENT, cardiac, peripheral vascular, lymphatic, respiratory, GI, , Musculoskeletal, skin, Neurological) was performed and found to be negative except as described in this note.       HOOS Hip Dysfunction & Osteoarthritis Outcome Questionnaire         No data to display                       [unfilled]    KOOS Knee Survey Assessment        9/24/2015     7:00 AM   Knee Outcome Survey ADL Scale (Eliseo, TOSHA; Aretha, L; Kandy, RS; Clem, FH; RODRIGO Arellnao; 1998)   Pain (ADLS1) 5   Stiffness (ADLS2) 5   Swelling (ADLS3) 5   Giving Way, Buckling or Shifting of Knee (ADLS4) 5   Weakness (ADLS5) 5   Limping (ADLS6) 5   Walk? (ADLS7) 5   Go up stairs? (ADLS8) 5   Go down stairs? (ADLS9) 5    Stand? (ADLS10) 5   Kneel on the front of your knee? (ADLS11) 5   Squat? (ADLS12) 4   Sit with your knee bent? (ADLS13) 5   Rise from a chair? (ADLS14) 5   How would you rate the current function of your knee during your usual daily activities on a scale from 0 to 100 with 100 being your level of knee function prior to your injury and 0 being the inability to perform any of your usual daily activities? 95   How would you rate the overall function of your knee during your usual daily activities?  (please check the one response that best describes you) 3   As a result of your knee injury, how would you rate your current level of daily activity? (please check the one response that best describes you) 4   Sum 69   Count 14   Raw Score 69   Knee Activity of Daily Living Score 98.57              Promis 10 Assessment        3/24/2025    10:26 AM   PROMIS 10   In general, would you say your health is: Very good   In general, would you say your quality of life is: Excellent   In general, how would you rate your physical health? Good   In general, how would you rate your mental health, including your mood and your ability to think? Very good   In general, how would you rate your satisfaction with your social activities and relationships? Very good   In general, please rate how well you carry out your usual social activities and roles Very good   To what extent are you able to carry out your everyday physical activities such as walking, climbing stairs, carrying groceries, or moving a chair? Moderately   In the past 7 days, how often have you been bothered by emotional problems such as feeling anxious, depressed, or irritable? Rarely   In the past 7 days, how would you rate your fatigue on average? Moderate   In the past 7 days, how would you rate your pain on average, where 0 means no pain, and 10 means worst imaginable pain? 7   In general, would you say your health is: 4   In general, would you say your quality of life  is: 5   In general, how would you rate your physical health? 3   In general, how would you rate your mental health, including your mood and your ability to think? 4   In general, how would you rate your satisfaction with your social activities and relationships? 4   In general, please rate how well you carry out your usual social activities and roles. (This includes activities at home, at work and in your community, and responsibilities as a parent, child, spouse, employee, friend, etc.) 4   To what extent are you able to carry out your everyday physical activities such as walking, climbing stairs, carrying groceries, or moving a chair? 3   In the past 7 days, how often have you been bothered by emotional problems such as feeling anxious, depressed, or irritable? 2   In the past 7 days, how would you rate your fatigue on average? 3   In the past 7 days, how would you rate your pain on average, where 0 means no pain, and 10 means worst imaginable pain? 7   Global Mental Health Score 17    Global Physical Health Score 11    PROMIS TOTAL - SUBSCORES 28        Patient-reported              [facility] [place] Knee Questionnaire         No data to display                         See intake form completed by patient

## 2025-03-24 ASSESSMENT — HOOS JR
BENDING TO THE FLOOR TO PICK UP OBJECT: MODERATE
WALKING ON UNEVEN SURFACE: MILD
LYING IN BED (TURNING OVER, MAINTAINING HIP POSITION): MODERATE
GOING UP OR DOWN STAIRS: MODERATE
SITTING: MILD
RISING FROM SITTING: MODERATE
HOOS JR TOTAL INTERVAL SCORE: 58.93

## 2025-03-27 ENCOUNTER — OFFICE VISIT (OUTPATIENT)
Dept: ORTHOPEDICS | Facility: CLINIC | Age: 70
End: 2025-03-27
Attending: FAMILY MEDICINE
Payer: COMMERCIAL

## 2025-03-27 ENCOUNTER — ANCILLARY PROCEDURE (OUTPATIENT)
Dept: GENERAL RADIOLOGY | Facility: CLINIC | Age: 70
End: 2025-03-27
Attending: ORTHOPAEDIC SURGERY
Payer: COMMERCIAL

## 2025-03-27 VITALS — BODY MASS INDEX: 48.1 KG/M2 | HEIGHT: 60 IN | WEIGHT: 245 LBS

## 2025-03-27 DIAGNOSIS — E66.01 MORBID OBESITY (H): Primary | ICD-10-CM

## 2025-03-27 DIAGNOSIS — M16.12 PRIMARY OSTEOARTHRITIS OF LEFT HIP: ICD-10-CM

## 2025-03-27 DIAGNOSIS — M25.552 HIP PAIN, LEFT: ICD-10-CM

## 2025-03-27 PROCEDURE — 73502 X-RAY EXAM HIP UNI 2-3 VIEWS: CPT | Mod: LT | Performed by: RADIOLOGY

## 2025-03-27 NOTE — LETTER
3/27/2025      Theodora Hamilton  [address] [facility] [address]      Dear Colleague,    Thank you for referring your patient, Theodora Hamilton, to the [facility] [place]. Please see a copy of my visit note below.        [facility]  Orthopaedic Surgery, Joint Replacement Consultation  by Jackson Arnold M.D.    Theodora Hamilton MRN# [id number]    YOB: 1955     Requesting physician: Shirley Fernandez            Assessment and Plan:   Assessment:  Prema is a 70-year-old female with left hip pain early left hip osteoarthritis.  We discussed that her radiographic findings demonstrate early degenerative changes of the left hip.  We discussed nonoperative treatment options and recommended utilization of pool therapy.  We discussed that focusing on weight loss would be beneficial for her if she decides to undergo total hip arthroplasty in the future.  She would like to avoid surgery at this time, and we will continue with nonoperative management for the time being.  Discussed recommendation for referral for bariatric clinic.  Patient would like to do her own weight loss at this time.  She will let us know if she needs a referral in the future.     Plan:  Return to clinic as needed    Elia Donohue MD  Orthopaedic Surgery, PGY-4        Attending MD (Dr. Jackson Arnold) Attestation :  This patient was seen and evaluated by me including a history, exam, and interpretation of all imaging and/or lab data.  I formulated the treatment plan along with either a physician's assistant (PA-C) or training physician (resident/fellow), who also saw the patient. That individual or a scribe has documented the visit in the attached note which I approve.    MD Vicente Paniagua Family Professor  Oncology and Adult Reconstructive Surgery  Dept Orthopaedic Surgery, [facility]  [phone] office, [phone] pager  [email or web address]           For  additional information and frequently asked questions regarding joint replacements, scan the QR code image below on your phone camera or go to:  [email or web address]        This note was created using dictation software and may contain errors.  Please contact the creator for any clarifications that are needed.           History of Present Illness:   70 year old female  chief complaint  Presenting to clinic for evaluation of left hip pain.  She states that she has been having longstanding left hip pain over the past year.  She reports that the pain is mainly in the anterior aspect of the hip.  She reports that she had radiating symptoms down the leg in the past with associated numbness and tingling.  These have subsided over time.  She reports that the pain is limiting her activities of daily living including going up and down the stairs.  She has been using a cane as an assistive device.  She has been using Tylenol and ibuprofen for pain control.  She has tried working with physical therapy.  She denies any other concerns at this time.    Current symptoms:  Problem: Left hip pain   Onset and duration: August 2024  Awakens from sleep due to sx's:  Yes  Precipitating Injury:  No    Other joints or sites painful:  No  Prior treatments:  Non steroidal anti-inflammatory agents or aspirin: Yes  Reduced activity:  Yes  Physical therapy:  Yes  Chiropractic care:  No  Injections with either steroid or viscosupplementation agents:  Yes   Last steroid injection on 1/24/25 with excellent relief.         Background history:  DX:  HLD  Vitamin D deficiency  HTN  CKD 3 (Cr 1.22)  BMI 47.85               Physical Exam:     EXAMINATION pertinent findings:   PSYCH: Pleasant, healthy-appearing, alert, oriented x3, cooperative. Normal mood and affect.  VITAL SIGNS: not currently breastfeeding..  Reviewed nursing intake notes.   There is no height or weight on file to calculate BMI.  RESP:  non labored breathing   ABD: benign, soft, non-tender, no acute peritoneal findings  SKIN: grossly normal   LYMPHATIC: grossly normal, no adenopathy, no extremity edema  NEURO: grossly normal , no motor deficits  VASCULAR: satisfactory perfusion of all extremities   MUSCULOSKELETAL:   Gait: Antalgic  Hips:   L hip: ROM  FF 90 , IRF 10 , ERF 30 , ABD 30    R hip: ROM   ,IRF 10 , ERF 30 , ABD 30    Left hip pain with flexion and internal rotation.   Negative straight leg raise.  Knees:   Bilateral knee range of motion supple with no pain.              Data:   All laboratory data reviewed  All imaging studies reviewed by me    Imaging tests:    Reviewed x-ray of the left hip obtained in clinic today:  My interpretation: This demonstrates early degenerative changes in bilateral hips.  This is associated with mild subchondral sclerosis.  There is preserved joint space bilaterally.  There is evidence of mild osteophyte formation.  No other acute osseous abnormalities.    Reviewed MRI of the left hip obtained on 9/20/2024:  My interpretation: This demonstrates relatively preserved cartilage throughout the left hip with mild loss in the anterior superior head.  There is chronic labral tear.  There is some mild bony edema in the acetabulum.        DATA for DOCUMENTATION:         Past Medical History:     Patient Active Problem List   Diagnosis     Asymptomatic postmenopausal status     OA (Osteoarthritis) - left great toe     HYPERLIPIDEMIA LDL GOAL <160     Morbid obesity (H)     Essential hypertension     Vitamin D deficiency     CKD (chronic kidney disease) stage 3, GFR 30-59 ml/min (H)     Hip pain, left     Acute left-sided low back pain with left-sided sciatica     Past Medical History:   Diagnosis Date     Arthritis      CKD (chronic kidney disease) stage 3, GFR 30-59 ml/min (H)      Essential hypertension 11/13/2020     Vitamin D deficiency 08/04/2023       Also see scanned health assessment forms.       Past  Surgical History:     Past Surgical History:   Procedure Laterality Date     COLONOSCOPY       COLONOSCOPY N/A 11/15/2019    Procedure: COLONOSCOPY, WITH POLYPECTOMY AND BIOPSY;  Surgeon: Jacob Nieves MD;  Location: [facility] OR     ESOPHAGOSCOPY, GASTROSCOPY, DUODENOSCOPY (EGD), COMBINED N/A 11/20/2023    Procedure: ESOPHAGOGASTRODUODENOSCOPY, WITH BIOPSY;  Surgeon: Forrest Villanueva MD;  Location: [facility] OR     INCISION AND DRAINAGE FINGER, COMBINED Left 7/9/2019    Procedure: Incision and Drainage of Left Index Finger;  Surgeon: Lowell Whelan MD;  Location: [facility] OR     NO HISTORY OF SURGERY              Social History:     Social History     Socioeconomic History     Marital status: Single     Spouse name: Not on file     Number of children: 0     Years of education: Not on file     Highest education level: Not on file   Occupational History     Employer: [employer]   Tobacco Use     Smoking status: Former     Current packs/day: 0.00     Types: Cigarettes     Passive exposure: Past     Smokeless tobacco: Never   Vaping Use     Vaping status: Never Used   Substance and Sexual Activity     Alcohol use: Yes     Comment: 5 times per year     Drug use: No     Sexual activity: Not Currently     Partners: Female   Other Topics Concern     Parent/sibling w/ CABG, MI or angioplasty before 65F 55M? No   Social History Narrative     Not on file     Social Drivers of Health     Financial Resource Strain: Low Risk  (2/1/2025)    Received from [facility]    Financial Resource Strain      Difficulty of Paying Living Expenses: 3      Difficulty of Paying Living Expenses: Not on file   Food Insecurity: No Food Insecurity (9/19/2024)    Received from [employer]    Food Insecurity      Do you worry your food will run out before you are able to buy more?: 1   Transportation Needs: No Transportation Needs (9/19/2024)    [facility]    Transportation Needs      Does lack of transportation keep you from medical appointments?: 1      Does lack of transportation keep you from work, meetings or getting things that you need?: 1   Physical Activity: Insufficiently Active (5/20/2024)    Exercise Vital Sign      Days of Exercise per Week: 1 day      Minutes of Exercise per Session: 30 min   Stress: No Stress Concern Present (5/20/2024)    [employer] - Occupational Stress Questionnaire      Feeling of Stress : Not at all   Social Connections: Socially Integrated (9/19/2024)    [facility]    Social Connections      Do you often feel lonely or isolated from those around you?: 0   Interpersonal Safety: Low Risk  (5/20/2024)    Interpersonal Safety      Do you feel physically and emotionally safe where you currently live?: Yes      Within the past 12 months, have you been hit, slapped, kicked or otherwise physically hurt by someone?: No      Within the past 12 months, have you been humiliated or emotionally abused in other ways by your partner or ex-partner?: No   Housing Stability: Low Risk  (9/19/2024)    [facility]    Housing Stability      What is your housing situation today?: 1            Family History:       Family History   Problem Relation Age of Onset     C.A.D. Maternal Grandmother      CAdalbertoAROSALINDA. Maternal Grandfather         17 MI's per history     C.A.ELY. Paternal Grandmother      Diabetes Paternal Grandmother      C.A.ELY. Paternal Grandfather      Diabetes Paternal Grandfather      Hypertension Mother      C.A.D. Mother         pacemaker     Circulatory Father         Carotid artery stenosis     Respiratory Father 75        COPD     Coronary Artery Disease Father      Substance Abuse Father      Diabetes Paternal Aunt      Diabetes Paternal Uncle      Coronary Artery Disease  Brother      Breast Cancer Cousin      Asthma No family hx of      Cerebrovascular Disease No family hx of      Cancer - colorectal No family hx of      Prostate Cancer No family hx of             Medications:     Current Outpatient Medications   Medication Sig Dispense Refill     amLODIPine (NORVASC) 2.5 MG tablet Take 1 tablet (2.5 mg) by mouth daily 90 tablet 3     atorvastatin (LIPITOR) 10 MG tablet Take 1 tablet (10 mg) by mouth daily 90 tablet 3     diphenhydrAMINE (BENADRYL) 25 MG tablet Take 25-50 mg by mouth daily as needed for allergies       lisinopril (ZESTRIL) 40 MG tablet Take 1 tablet (40 mg) by mouth daily 90 tablet 3     omeprazole (PRILOSEC) 40 MG DR capsule Take 1 capsule (40 mg) by mouth daily 90 capsule 3     vitamin D3 (CHOLECALCIFEROL) 50 mcg (2000 units) tablet Take 1 tablet (50 mcg) by mouth daily       alum & mag hydroxide-simethicone (MYLANTA/MAALOX) 200-200-20 MG/5ML SUSP suspension Take 30 mLs by mouth once as needed for indigestion or heartburn (uses 2-3 times per week) (Patient not taking: Reported on 7/1/2024)       docusate sodium (COLACE) 100 MG capsule Take 1 capsule (100 mg) by mouth 2 times daily. (Patient not taking: Reported on 3/27/2025) 30 capsule 0     meclizine (ANTIVERT) 25 MG tablet Take 1 tablet 3 times daily for the next 3 days then take 1 tablet 3 times daily as needed for persistent dizziness (Patient not taking: Reported on 3/27/2025) 21 tablet 0     No current facility-administered medications for this visit.              Review of Systems:   A comprehensive 10 point review of systems (constitutional, ENT, cardiac, peripheral vascular, lymphatic, respiratory, GI, , Musculoskeletal, skin, Neurological) was performed and found to be negative except as described in this note.       HOOS Hip Dysfunction & Osteoarthritis Outcome Questionnaire         No data to display                       [unfilled]    KOOS Knee Survey Assessment        9/24/2015     7:00 AM   Knee  Outcome Survey ADL Scale (Eliseo, TOSHA; Aretha L; Kandy, RS; Clem, FH; Adan, RODRIGO; 1998)   Pain (ADLS1) 5   Stiffness (ADLS2) 5   Swelling (ADLS3) 5   Giving Way, Buckling or Shifting of Knee (ADLS4) 5   Weakness (ADLS5) 5   Limping (ADLS6) 5   Walk? (ADLS7) 5   Go up stairs? (ADLS8) 5   Go down stairs? (ADLS9) 5   Stand? (ADLS10) 5   Kneel on the front of your knee? (ADLS11) 5   Squat? (ADLS12) 4   Sit with your knee bent? (ADLS13) 5   Rise from a chair? (ADLS14) 5   How would you rate the current function of your knee during your usual daily activities on a scale from 0 to 100 with 100 being your level of knee function prior to your injury and 0 being the inability to perform any of your usual daily activities? 95   How would you rate the overall function of your knee during your usual daily activities?  (please check the one response that best describes you) 3   As a result of your knee injury, how would you rate your current level of daily activity? (please check the one response that best describes you) 4   Sum 69   Count 14   Raw Score 69   Knee Activity of Daily Living Score 98.57              Promis 10 Assessment        3/24/2025    10:26 AM   PROMIS 10   In general, would you say your health is: Very good   In general, would you say your quality of life is: Excellent   In general, how would you rate your physical health? Good   In general, how would you rate your mental health, including your mood and your ability to think? Very good   In general, how would you rate your satisfaction with your social activities and relationships? Very good   In general, please rate how well you carry out your usual social activities and roles Very good   To what extent are you able to carry out your everyday physical activities such as walking, climbing stairs, carrying groceries, or moving a chair? Moderately   In the past 7 days, how often have you been bothered by emotional problems such as feeling anxious,  depressed, or irritable? Rarely   In the past 7 days, how would you rate your fatigue on average? Moderate   In the past 7 days, how would you rate your pain on average, where 0 means no pain, and 10 means worst imaginable pain? 7   In general, would you say your health is: 4   In general, would you say your quality of life is: 5   In general, how would you rate your physical health? 3   In general, how would you rate your mental health, including your mood and your ability to think? 4   In general, how would you rate your satisfaction with your social activities and relationships? 4   In general, please rate how well you carry out your usual social activities and roles. (This includes activities at home, at work and in your community, and responsibilities as a parent, child, spouse, employee, friend, etc.) 4   To what extent are you able to carry out your everyday physical activities such as walking, climbing stairs, carrying groceries, or moving a chair? 3   In the past 7 days, how often have you been bothered by emotional problems such as feeling anxious, depressed, or irritable? 2   In the past 7 days, how would you rate your fatigue on average? 3   In the past 7 days, how would you rate your pain on average, where 0 means no pain, and 10 means worst imaginable pain? 7   Global Mental Health Score 17    Global Physical Health Score 11    PROMIS TOTAL - SUBSCORES 28        Patient-reported              [facility] Knee Questionnaire         No data to display                         See intake form completed by patient      Again, thank you for allowing me to participate in the care of your patient.        Sincerely,        Jackson Arnold MD    Electronically signed

## 2025-04-10 ENCOUNTER — PATIENT OUTREACH (OUTPATIENT)
Dept: CARE COORDINATION | Facility: CLINIC | Age: 70
End: 2025-04-10
Payer: COMMERCIAL

## 2025-04-17 PROBLEM — M54.42 ACUTE LEFT-SIDED LOW BACK PAIN WITH LEFT-SIDED SCIATICA: Status: RESOLVED | Noted: 2024-10-07 | Resolved: 2025-04-17

## 2025-04-17 PROBLEM — M25.552 HIP PAIN, LEFT: Status: RESOLVED | Noted: 2024-10-07 | Resolved: 2025-04-17

## 2025-04-21 ENCOUNTER — PATIENT OUTREACH (OUTPATIENT)
Dept: CARE COORDINATION | Facility: CLINIC | Age: 70
End: 2025-04-21
Payer: COMMERCIAL

## 2025-05-05 ENCOUNTER — PATIENT OUTREACH (OUTPATIENT)
Dept: CARE COORDINATION | Facility: CLINIC | Age: 70
End: 2025-05-05
Payer: COMMERCIAL

## 2025-05-08 ENCOUNTER — PATIENT OUTREACH (OUTPATIENT)
Dept: CARE COORDINATION | Facility: CLINIC | Age: 70
End: 2025-05-08
Payer: COMMERCIAL

## 2025-06-05 ENCOUNTER — MYC REFILL (OUTPATIENT)
Dept: FAMILY MEDICINE | Facility: CLINIC | Age: 70
End: 2025-06-05
Payer: COMMERCIAL

## 2025-06-05 DIAGNOSIS — I10 ESSENTIAL HYPERTENSION: ICD-10-CM

## 2025-06-05 RX ORDER — AMLODIPINE BESYLATE 2.5 MG/1
2.5 TABLET ORAL DAILY
Qty: 90 TABLET | Refills: 3 | Status: SHIPPED | OUTPATIENT
Start: 2025-06-05

## 2025-06-21 ENCOUNTER — HEALTH MAINTENANCE LETTER (OUTPATIENT)
Age: 70
End: 2025-06-21

## 2025-07-09 DIAGNOSIS — E78.5 HYPERLIPIDEMIA LDL GOAL <160: ICD-10-CM

## 2025-07-09 DIAGNOSIS — I10 ESSENTIAL HYPERTENSION: ICD-10-CM

## 2025-07-09 NOTE — TELEPHONE ENCOUNTER
Upcoming refills needed. Patient has [facility] coverage and with this insurance plan, the patient is eligible to receive certain prescriptions as a 100-day supply at the 90-day supply cost.      Prescriptions to be updated to 100-day supply: atorvastatin and lisinopril     New prescription needed given insufficient refills remaining so pended for PCP review and signature.       Thank you!    Yolanda Rasmussen, PharmD, [facility]  Population Health Pharmacist  [phone]

## 2025-07-10 ENCOUNTER — TELEPHONE (OUTPATIENT)
Dept: FAMILY MEDICINE | Facility: CLINIC | Age: 70
End: 2025-07-10
Payer: COMMERCIAL

## 2025-07-10 RX ORDER — AMLODIPINE BESYLATE 2.5 MG/1
2.5 TABLET ORAL DAILY
Qty: 100 TABLET | Refills: 1 | Status: SHIPPED | OUTPATIENT
Start: 2025-07-10

## 2025-07-10 RX ORDER — ATORVASTATIN CALCIUM 10 MG/1
10 TABLET, FILM COATED ORAL DAILY
Qty: 100 TABLET | Refills: 1 | Status: SHIPPED | OUTPATIENT
Start: 2025-07-10

## 2025-07-10 NOTE — TELEPHONE ENCOUNTER
Patient Quality Outreach    Patient is due for the following:   Breast Cancer Screening - Mammogram  Physical Annual Wellness Visit    Action(s) Taken:   Patient has upcoming appointment, these items will be addressed at that time.    Type of outreach:    Chart review performed, no outreach needed.    Questions for provider review:    None         Jaimee Berger CMA  Chart routed to None.

## 2025-07-12 ENCOUNTER — HEALTH MAINTENANCE LETTER (OUTPATIENT)
Age: 70
End: 2025-07-12

## 2025-07-23 SDOH — HEALTH STABILITY: PHYSICAL HEALTH: ON AVERAGE, HOW MANY DAYS PER WEEK DO YOU ENGAGE IN MODERATE TO STRENUOUS EXERCISE (LIKE A BRISK WALK)?: 1 DAY

## 2025-07-23 SDOH — HEALTH STABILITY: PHYSICAL HEALTH: ON AVERAGE, HOW MANY MINUTES DO YOU ENGAGE IN EXERCISE AT THIS LEVEL?: 10 MIN

## 2025-07-23 ASSESSMENT — SOCIAL DETERMINANTS OF HEALTH (SDOH): HOW OFTEN DO YOU GET TOGETHER WITH FRIENDS OR RELATIVES?: THREE TIMES A WEEK

## 2025-07-25 DIAGNOSIS — K21.00 GASTROESOPHAGEAL REFLUX DISEASE WITH ESOPHAGITIS WITHOUT HEMORRHAGE: ICD-10-CM

## 2025-07-28 ENCOUNTER — ANCILLARY PROCEDURE (OUTPATIENT)
Dept: MAMMOGRAPHY | Facility: CLINIC | Age: 70
End: 2025-07-28
Attending: PHYSICIAN ASSISTANT
Payer: COMMERCIAL

## 2025-07-28 ENCOUNTER — OFFICE VISIT (OUTPATIENT)
Dept: FAMILY MEDICINE | Facility: CLINIC | Age: 70
End: 2025-07-28
Attending: PHYSICIAN ASSISTANT
Payer: COMMERCIAL

## 2025-07-28 VITALS
HEART RATE: 69 BPM | DIASTOLIC BLOOD PRESSURE: 81 MMHG | OXYGEN SATURATION: 98 % | RESPIRATION RATE: 18 BRPM | TEMPERATURE: 97.2 F | HEIGHT: 60 IN | SYSTOLIC BLOOD PRESSURE: 136 MMHG | BODY MASS INDEX: 49.79 KG/M2 | WEIGHT: 253.6 LBS

## 2025-07-28 DIAGNOSIS — I10 ESSENTIAL HYPERTENSION: ICD-10-CM

## 2025-07-28 DIAGNOSIS — Z12.31 VISIT FOR SCREENING MAMMOGRAM: ICD-10-CM

## 2025-07-28 DIAGNOSIS — Z13.6 SCREENING FOR CARDIOVASCULAR CONDITION: ICD-10-CM

## 2025-07-28 DIAGNOSIS — N18.31 STAGE 3A CHRONIC KIDNEY DISEASE (H): ICD-10-CM

## 2025-07-28 DIAGNOSIS — K21.00 GASTROESOPHAGEAL REFLUX DISEASE WITH ESOPHAGITIS WITHOUT HEMORRHAGE: ICD-10-CM

## 2025-07-28 DIAGNOSIS — E78.5 HYPERLIPIDEMIA LDL GOAL <160: ICD-10-CM

## 2025-07-28 DIAGNOSIS — Z00.00 ENCOUNTER FOR MEDICARE ANNUAL WELLNESS EXAM: Primary | ICD-10-CM

## 2025-07-28 LAB
ALBUMIN SERPL BCG-MCNC: 3.9 G/DL (ref 3.5–5.2)
ALP SERPL-CCNC: 128 U/L (ref 40–150)
ALT SERPL W P-5'-P-CCNC: 20 U/L (ref 0–50)
ANION GAP SERPL CALCULATED.3IONS-SCNC: 11 MMOL/L (ref 7–15)
AST SERPL W P-5'-P-CCNC: 24 U/L (ref 0–45)
BILIRUB SERPL-MCNC: 0.5 MG/DL
BUN SERPL-MCNC: 21.3 MG/DL (ref 8–23)
CALCIUM SERPL-MCNC: 9.2 MG/DL (ref 8.8–10.4)
CHLORIDE SERPL-SCNC: 107 MMOL/L (ref 98–107)
CHOLEST SERPL-MCNC: 183 MG/DL
CREAT SERPL-MCNC: 1.19 MG/DL (ref 0.51–0.95)
CREAT UR-MCNC: 121 MG/DL
EGFRCR SERPLBLD CKD-EPI 2021: 49 ML/MIN/1.73M2
FASTING STATUS PATIENT QL REPORTED: YES
FASTING STATUS PATIENT QL REPORTED: YES
GLUCOSE SERPL-MCNC: 103 MG/DL (ref 70–99)
HCO3 SERPL-SCNC: 22 MMOL/L (ref 22–29)
HDLC SERPL-MCNC: 58 MG/DL
HGB BLD-MCNC: 13.7 G/DL (ref 11.7–15.7)
LDLC SERPL CALC-MCNC: 97 MG/DL
MCV RBC AUTO: 93 FL (ref 78–100)
MICROALBUMIN UR-MCNC: <12 MG/L
MICROALBUMIN/CREAT UR: NORMAL MG/G{CREAT}
NONHDLC SERPL-MCNC: 125 MG/DL
POTASSIUM SERPL-SCNC: 4.6 MMOL/L (ref 3.4–5.3)
PROT SERPL-MCNC: 7.1 G/DL (ref 6.4–8.3)
SODIUM SERPL-SCNC: 140 MMOL/L (ref 135–145)
TRIGL SERPL-MCNC: 139 MG/DL

## 2025-07-28 PROCEDURE — 82570 ASSAY OF URINE CREATININE: CPT | Performed by: PHYSICIAN ASSISTANT

## 2025-07-28 PROCEDURE — 80053 COMPREHEN METABOLIC PANEL: CPT | Performed by: PHYSICIAN ASSISTANT

## 2025-07-28 PROCEDURE — 85018 HEMOGLOBIN: CPT | Performed by: PHYSICIAN ASSISTANT

## 2025-07-28 PROCEDURE — 77063 BREAST TOMOSYNTHESIS BI: CPT | Performed by: RADIOLOGY

## 2025-07-28 PROCEDURE — 77067 SCR MAMMO BI INCL CAD: CPT | Performed by: RADIOLOGY

## 2025-07-28 PROCEDURE — G2211 COMPLEX E/M VISIT ADD ON: HCPCS | Performed by: PHYSICIAN ASSISTANT

## 2025-07-28 PROCEDURE — 3075F SYST BP GE 130 - 139MM HG: CPT | Performed by: PHYSICIAN ASSISTANT

## 2025-07-28 PROCEDURE — 82043 UR ALBUMIN QUANTITATIVE: CPT | Performed by: PHYSICIAN ASSISTANT

## 2025-07-28 PROCEDURE — 3079F DIAST BP 80-89 MM HG: CPT | Performed by: PHYSICIAN ASSISTANT

## 2025-07-28 PROCEDURE — 80061 LIPID PANEL: CPT | Performed by: PHYSICIAN ASSISTANT

## 2025-07-28 PROCEDURE — 99214 OFFICE O/P EST MOD 30 MIN: CPT | Mod: 25 | Performed by: PHYSICIAN ASSISTANT

## 2025-07-28 PROCEDURE — 36415 COLL VENOUS BLD VENIPUNCTURE: CPT | Performed by: PHYSICIAN ASSISTANT

## 2025-07-28 PROCEDURE — G0439 PPPS, SUBSEQ VISIT: HCPCS | Performed by: PHYSICIAN ASSISTANT

## 2025-07-28 RX ORDER — OMEPRAZOLE 40 MG/1
40 CAPSULE, DELAYED RELEASE ORAL DAILY
Qty: 100 CAPSULE | Refills: 3 | Status: SHIPPED | OUTPATIENT
Start: 2025-07-28

## 2025-07-28 RX ORDER — LISINOPRIL 40 MG/1
40 TABLET ORAL DAILY
Qty: 100 TABLET | Refills: 3 | Status: SHIPPED | OUTPATIENT
Start: 2025-07-28

## 2025-07-28 RX ORDER — ATORVASTATIN CALCIUM 10 MG/1
10 TABLET, FILM COATED ORAL DAILY
Qty: 100 TABLET | Refills: 3 | Status: SHIPPED | OUTPATIENT
Start: 2025-07-28

## 2025-07-28 RX ORDER — OMEPRAZOLE 40 MG/1
40 CAPSULE, DELAYED RELEASE ORAL DAILY
Qty: 90 CAPSULE | Refills: 0 | OUTPATIENT
Start: 2025-07-28

## 2025-07-28 RX ORDER — AMLODIPINE BESYLATE 2.5 MG/1
2.5 TABLET ORAL DAILY
Qty: 100 TABLET | Refills: 3 | Status: SHIPPED | OUTPATIENT
Start: 2025-07-28

## 2025-07-28 NOTE — PROGRESS NOTES
"Preventive Care Visit  [facility], Family [facility]  Jul 28, 2025      Assessment & Plan     Encounter for Medicare annual wellness exam    Screening for cardiovascular condition  Lipid panel ordered.    Essential hypertension  Well managed on current regimen, refilled.  - amLODIPine (NORVASC) 2.5 MG tablet; Take 1 tablet (2.5 mg) by mouth daily.  - lisinopril (ZESTRIL) 40 MG tablet; Take 1 tablet (40 mg) by mouth daily.    Hyperlipidemia LDL goal <160  - Lipid panel reflex to direct LDL Non-fasting; Future  - atorvastatin (LIPITOR) 10 MG tablet; Take 1 tablet (10 mg) by mouth daily.  - Lipid panel reflex to direct LDL Non-fasting    Stage 3a chronic kidney disease (H)  Depending on lab results, consider nephrology referral.  - Albumin Random Urine Quantitative with Creat Ratio; Future  - Hemoglobin; Future  - Comprehensive metabolic panel (BMP + Alb, Alk Phos, ALT, AST, Total. Bili, TP); Future  - Albumin Random Urine Quantitative with Creat Ratio  - Hemoglobin  - Comprehensive metabolic panel (BMP + Alb, Alk Phos, ALT, AST, Total. Bili, TP)    Gastroesophageal reflux disease with esophagitis without hemorrhage  Stable on current medication, refilled.  - omeprazole (PRILOSEC) 40 MG DR capsule; Take 1 capsule (40 mg) by mouth daily.  Patient has been advised of split billing requirements and indicates understanding: Yes  The longitudinal plan of care for the diagnosis(es)/condition(s) as documented were addressed during this visit. Due to the added complexity in care, I will continue to support Prema in the subsequent management and with ongoing continuity of care.  BMI  Estimated body mass index is 49.79 kg/m  as calculated from the following:    Height as of this encounter: 1.52 m (4' 11.84\").    Weight as of this encounter: 115 kg (253 lb 9.6 oz).   Weight management plan: Discussed healthy diet and exercise guidelines    Counseling  Appropriate preventive services were " addressed with this patient via screening, questionnaire, or discussion as appropriate for fall prevention, nutrition, physical activity, Tobacco-use cessation, social engagement, weight loss and cognition.  Checklist reviewing preventive services available has been given to the patient.  Reviewed patient's diet, addressing concerns and/or questions.   She is at risk for lack of exercise and has been provided with information to increase physical activity for the benefit of her well-being.   Discussed possible causes of fatigue. The patient was provided with written information regarding signs of hearing loss.   Reviewed preventive health counseling, as reflected in patient instructions      Pawel Lloyd is a 70 year old, presenting for the following:  Physical        7/28/2025     9:13 AM   Additional Questions   Roomed by Jaimee WHITE        Via the Health Maintenance questionnaire, the patient has reported the following services have been completed -Mammogram: [facility] 2024-05-10, this information has not been sent to the abstraction team.    HPI     No concerns     Usually has one BM per day-over the last few weeks has had to go maybe 2-3 times per day, very small amount approximately a tablespoon of looser stool. Provided education about fiber and hydration.    Has been a little tired. Had a cold 2-3 weeks ago.    Advance Care Planning    Patient states has Health Care Directive and will send to [employer].        7/23/2025   General Health   How would you rate your overall physical health? Good   Feel stress (tense, anxious, or unable to sleep) Only a little   (!) STRESS CONCERN      7/23/2025   Nutrition   Diet: Regular (no restrictions)         7/23/2025   Exercise   Days per week of moderate/strenous exercise 1 day   Average minutes spent exercising at this level 10 min   (!) EXERCISE CONCERN      7/23/2025   Social Factors   Frequency of gathering with friends or relatives Three times a week   Worry  food won't last until get money to buy more No   Food not last or not have enough money for food? No   Do you have housing? (Housing is defined as stable permanent housing and does not include staying outside in a car, in a tent, in an abandoned building, in an overnight shelter, or couch-surfing.) Yes   Are you worried about losing your housing? No   Lack of transportation? No   Unable to get utilities (heat,electricity)? No         7/28/2025   Fall Risk   Gait Speed Test (Document in seconds) 5   Gait Speed Test Interpretation Less than or equal to 5.00 seconds - PASS          7/23/2025   Activities of Daily Living- Home Safety   Needs help with the following daily activites None of the above   Safety concerns in the home None of the above         7/23/2025   Dental   Dentist two times every year? Yes         7/23/2025   Hearing Screening   Hearing concerns? (!) I FEEL THAT PEOPLE ARE MUMBLING OR NOT SPEAKING CLEARLY.    (!) IT'S HARD TO FOLLOW A CONVERSATION IN A NOISY RESTAURANT OR CROWDED ROOM.   Would you like a referral for hearing testing? No       Multiple values from one day are sorted in reverse-chronological order         7/23/2025   Driving Risk Screening   Patient/family members have concerns about driving No         7/23/2025   General Alertness/Fatigue Screening   Have you been more tired than usual lately? (!) YES         7/23/2025   Urinary Incontinence Screening   Bothered by leaking urine in past 6 months No         Today's PHQ-2 Score:       7/28/2025     9:06 AM   PHQ-2 ( 1999 [employer])   Q1: Little interest or pleasure in doing things 0   Q2: Feeling down, depressed or hopeless 0   PHQ-2 Score 0    Q1: Little interest or pleasure in doing things Not at all   Q2: Feeling down, depressed or hopeless Not at all   PHQ-2 Score 0       Patient-reported           7/23/2025   Substance Use   Alcohol more than 3/day or more than 7/wk No   Do you have a current opioid prescription? No   How severe/bad is  pain from 1 to 10? 1/10   Do you use any other substances recreationally? No     Social History     Tobacco Use    Smoking status: Former     Current packs/day: 0.00     Types: Cigarettes     Passive exposure: Past    Smokeless tobacco: Never   Vaping Use    Vaping status: Never Used   Substance Use Topics    Alcohol use: Yes     Comment: 5 times per year    Drug use: No           5/10/2023   LAST FHS-7 RESULTS   1st degree relative breast or ovarian cancer No   Any relative bilateral breast cancer No   Any male have breast cancer No   Any ONE woman have BOTH breast AND ovarian cancer No   Any woman with breast cancer before 50yrs No   2 or more relatives with breast AND/OR ovarian cancer No   2 or more relatives with breast AND/OR bowel cancer No        Mammogram Screening - Mammogram every 1-2 years updated in Health Maintenance based on mutual decision making    ASCVD Risk   The 10-year ASCVD risk score (Chi GTZ, et al., 2019) is: 14.5%    Values used to calculate the score:      Age: 70 years      Sex: Female      Is Non- : No      Diabetic: No      Tobacco smoker: No      Systolic Blood Pressure: 136 mmHg      Is BP treated: Yes      HDL Cholesterol: 69 mg/dL      Total Cholesterol: 167 mg/dL          Reviewed and updated as needed this visit by Provider   Tobacco  Allergies  Meds  Problems  Med Hx  Surg Hx  Fam Hx            Past Medical History:   Diagnosis Date    Arthritis     CKD (chronic kidney disease) stage 3, GFR 30-59 ml/min (H)     Essential hypertension 11/13/2020    Vitamin D deficiency 08/04/2023     Current providers sharing in care for this patient include:  Patient Care Team:  Shirley Warner PA-C as PCP - General (Family Medicine)  Shirley Warner PA-C as Assigned PCP  Teresita Alves APRN CNP as Assigned Neuroscience Provider  Michele Watkins DO as Assigned Musculoskeletal Provider    The following health maintenance items are  "reviewed in Epic and correct as of today:  Health Maintenance   Topic Date Due    RSV VACCINE (1 - Risk 60-74 years 1-dose series) Never done    ANNUAL REVIEW OF HM ORDERS  05/10/2024    MICROALBUMIN  10/27/2024    COVID-19 VACCINE (7 - 2024-25 season) 04/10/2025    MAMMO SCREENING  05/10/2025    LIPID  05/20/2025    DTAP/TDAP/TD VACCINE (2 - Td or Tdap) 07/29/2025    HEMOGLOBIN  09/27/2025    INFLUENZA VACCINE (1) 09/01/2025    BMP  09/27/2025    MEDICARE ANNUAL WELLNESS VISIT  07/28/2026    FALL RISK ASSESSMENT  07/28/2026    DIABETES SCREENING  09/27/2027    DEXA  10/27/2028    COLORECTAL CANCER SCREENING  11/15/2029    ADVANCE CARE PLANNING  07/28/2030    HEPATITIS C SCREENING  Completed    PHQ-2 (once per calendar year)  Completed    PNEUMOCOCCAL VACCINE 50+ YEARS  Completed    URINALYSIS  Completed    HPV VACCINE (No Doses Required) Completed    ZOSTER VACCINE  Completed    MENINGITIS VACCINE  Aged Out    LUNG CANCER SCREENING  Discontinued        ROS: 10 point ROS neg other than the symptoms noted above in the HPI.       Objective    Exam  /81 (BP Location: Left arm, Patient Position: Sitting, Cuff Size: Adult Large)   Pulse 69   Temp 97.2  F (36.2  C) (Temporal)   Resp 18   Ht 1.52 m (4' 11.84\")   Wt 115 kg (253 lb 9.6 oz)   LMP  (LMP Unknown)   SpO2 98%   BMI 49.79 kg/m     Estimated body mass index is 49.79 kg/m  as calculated from the following:    Height as of this encounter: 1.52 m (4' 11.84\").    Weight as of this encounter: 115 kg (253 lb 9.6 oz).    Physical Exam  GENERAL: alert and no distress  EYES: Eyes grossly normal to inspection, PERRL and conjunctivae and sclerae normal  HENT: ear canals and TM's normal, nose and mouth without ulcers or lesions  NECK: no adenopathy, no asymmetry, masses, or scars  RESP: lungs clear to auscultation - no rales, rhonchi or wheezes  CV: regular rate and rhythm, normal S1 S2, no S3 or S4, no murmur, click or rub, no peripheral edema  ABDOMEN: soft, " nontender, no hepatosplenomegaly, no masses  MS: no gross musculoskeletal defects noted, no edema  SKIN: no suspicious lesions or rashes  NEURO: Normal strength and tone, mentation intact and speech normal  PSYCH: mentation appears normal, affect normal/bright         7/28/2025   Mini Cog   Clock Draw Score 2 Normal   3 Item Recall 3 objects recalled   Mini Cog Total Score 5              Signed Electronically by: Shirley Warner PA-C    The student LIZZIE Kinney acted as a scribe and the encounter documented above was completely performed by myself and the documentation reflects the work I have performed today.   Shirley Warner PA-C

## 2025-07-28 NOTE — PATIENT INSTRUCTIONS
Patient Education   Preventive Care Advice   This is general advice given by our system to help you stay healthy. However, your care team may have specific advice just for you. Please talk to your care team about your preventive care needs.  Nutrition  Eat 5 or more servings of fruits and vegetables each day.  Try wheat bread, brown rice and whole grain pasta (instead of white bread, rice, and pasta).  Get enough calcium and vitamin D. Check the label on foods and aim for 100% of the RDA (recommended daily allowance).  Lifestyle  Exercise at least 150 minutes each week  (30 minutes a day, 5 days a week).  Do muscle strengthening activities 2 days a week. These help control your weight and prevent disease.  No smoking.  Wear sunscreen to prevent skin cancer.  Have a dental exam and cleaning every 6 months.  Yearly exams  See your health care team every year to talk about:  Any changes in your health.  Any medicines your care team has prescribed.  Preventive care, family planning, and ways to prevent chronic diseases.  Shots (vaccines)   HPV shots (up to age 26), if you've never had them before.  Hepatitis B shots (up to age 59), if you've never had them before.  COVID-19 shot: Get this shot when it's due.  Flu shot: Get a flu shot every year.  Tetanus shot: Get a tetanus shot every 10 years.  Pneumococcal, hepatitis A, and RSV shots: Ask your care team if you need these based on your risk.  Shingles shot (for age 50 and up)  General health tests  Diabetes screening:  Starting at age 35, Get screened for diabetes at least every 3 years.  If you are younger than age 35, ask your care team if you should be screened for diabetes.  Cholesterol test: At age 39, start having a cholesterol test every 5 years, or more often if advised.  Bone density scan (DEXA): At age 50, ask your care team if you should have this scan for osteoporosis (brittle bones).  Hepatitis C: Get tested at least once in your life.  STIs (sexually  transmitted infections)  Before age 24: Ask your care team if you should be screened for STIs.  After age 24: Get screened for STIs if you're at risk. You are at risk for STIs (including HIV) if:  You are sexually active with more than one person.  You don't use condoms every time.  You or a partner was diagnosed with a sexually transmitted infection.  If you are at risk for HIV, ask about PrEP medicine to prevent HIV.  Get tested for HIV at least once in your life, whether you are at risk for HIV or not.  Cancer screening tests  Cervical cancer screening: If you have a cervix, begin getting regular cervical cancer screening tests starting at age 21.  Breast cancer scan (mammogram): If you've ever had breasts, begin having regular mammograms starting at age 40. This is a scan to check for breast cancer.  Colon cancer screening: It is important to start screening for colon cancer at age 45.  Have a colonoscopy test every 10 years (or more often if you're at risk) Or, ask your provider about stool tests like a FIT test every year or Cologuard test every 3 years.  To learn more about your testing options, visit:   .  For help making a decision, visit:   https://bit.ly/kw27881.  Prostate cancer screening test: If you have a prostate, ask your care team if a prostate cancer screening test (PSA) at age 55 is right for you.  Lung cancer screening: If you are a current or former smoker ages 50 to 80, ask your care team if ongoing lung cancer screenings are right for you.  For informational purposes only. Not to replace the advice of your health care provider. Copyright   2023 [facility]. All rights reserved. Clinically reviewed by the [facility] Transitions Program. [employer] [id number] - REV 01/24.  Preventing Falls: Care Instructions  Injuries and health problems such as trouble walking or poor eyesight can increase your risk of falling. So can some medicines. But there are things you can do to help  "prevent falls. You can exercise to get stronger. You can also arrange your home to make it safer.    Talk to your doctor about the medicines you take. Ask if any of them increase the risk of falls and whether they can be changed or stopped.   Try to exercise regularly. It can help improve your strength and balance. This can help lower your risk of falling.         Practice fall safety and prevention.   Wear low-heeled shoes that fit well and give your feet good support. Talk to your doctor if you have foot problems that make this hard.  Carry a cellphone or wear a medical alert device that you can use to call for help.  Use stepladders instead of chairs to reach high objects. Don't climb if you're at risk for falls. Ask for help, if needed.  Wear the correct eyeglasses, if you need them.        Make your home safer.   Remove rugs, cords, clutter, and furniture from walkways.  Keep your house well lit. Use night-lights in hallways and bathrooms.  Install and use sturdy handrails on stairways.  Wear nonskid footwear, even inside. Don't walk barefoot or in socks without shoes.        Be safe outside.   Use handrails, curb cuts, and ramps whenever possible.  Keep your hands free by using a shoulder bag or backpack.  Try to walk in well-lit areas. Watch out for uneven ground, changes in pavement, and debris.  Be careful in the winter. Walk on the grass or gravel when sidewalks are slippery. Use de-icer on steps and walkways. Add non-slip devices to shoes.    Put grab bars and nonskid mats in your shower or tub and near the toilet. Try to use a shower chair or bath bench when bathing.   Get into a tub or shower by putting in your weaker leg first. Get out with your strong side first. Have a phone or medical alert device in the bathroom with you.   Where can you learn more?  Go to https://www.[employer].net/patiented  Enter G117 in the search box to learn more about \"Preventing Falls: Care Instructions.\"  Current as of: " July 31, 2024  Content Version: 14.5    0763-0316 [employer].   Care instructions adapted under license by your healthcare professional. If you have questions about a medical condition or this instruction, always ask your healthcare professional. [employer] disclaims any warranty or liability for your use of this information.    Hearing Loss: Care Instructions  Overview     Hearing loss is a sudden or slow decrease in how well you hear. It can range from slight to profound. Permanent hearing loss can occur with aging. It also can happen when you are exposed long-term to loud noise. Examples include listening to loud music, riding motorcycles, or being around other loud machines.  Hearing loss can affect your work and home life. It can make you feel lonely or depressed. You may feel that you have lost your independence. But hearing aids and other devices can help you hear better and feel connected to others.  Follow-up care is a key part of your treatment and safety. Be sure to make and go to all appointments, and call your doctor if you are having problems. It's also a good idea to know your test results and keep a list of the medicines you take.  How can you care for yourself at home?  Avoid loud noises whenever possible. This helps keep your hearing from getting worse.  Always wear hearing protection around loud noises.  Wear a hearing aid as directed.  A professional can help you pick a hearing aid that will work best for you.  You can also get hearing aids over the counter for mild to moderate hearing loss.  Have hearing tests as your doctor suggests. They can show whether your hearing has changed. Your hearing aid may need to be adjusted.  Use other devices as needed. These may include:  Telephone amplifiers and hearing aids that can connect to a television, stereo, radio, or microphone.  Devices that use lights or vibrations. These alert you to the doorbell, a ringing telephone, or a  "baby monitor.  Television closed-captioning. This shows the words at the bottom of the screen. Most new TVs can do this.  TTY (text telephone). This lets you type messages back and forth on the telephone instead of talking or listening. These devices are also called TDD. When messages are typed on the keyboard, they are sent over the phone line to a receiving TTY. The message is shown on a monitor.  Use text messaging, social media, and email if it is hard for you to communicate by telephone.  Try to learn a listening technique called speechreading. It is not lipreading. You pay attention to people's gestures, expressions, posture, and tone of voice. These clues can help you understand what a person is saying. Face the person you are talking to, and have them face you. Make sure the lighting is good. You need to see the other person's face clearly.  Think about counseling if you need help to adjust to your hearing loss.  When should you call for help?  Watch closely for changes in your health, and be sure to contact your doctor if:    You think your hearing is getting worse.     You have new symptoms, such as dizziness or nausea.   Where can you learn more?  Go to https://www.[employer].net/patiented  Enter R798 in the search box to learn more about \"Hearing Loss: Care Instructions.\"  Current as of: October 27, 2024  Content Version: 14.5 2024-2025 "[employer].   Care instructions adapted under license by your healthcare professional. If you have questions about a medical condition or this instruction, always ask your healthcare professional. "[employer] disclaims any warranty or liability for your use of this information.    Learning About Sleeping Well  What does sleeping well mean?     Sleeping well means getting enough sleep to feel good and stay healthy. How much sleep is enough varies among people.  The number of hours you sleep and how you feel when you wake up are both important. If " you do not feel refreshed, you probably need more sleep. Another sign of not getting enough sleep is feeling tired during the day.  Experts recommend that adults get at least 7 or more hours of sleep per day. Children and older adults need more sleep.  Why is getting enough sleep important?  Getting enough quality sleep is a basic part of good health. When your sleep suffers, your physical health, mood, and your thoughts can suffer too. You may find yourself feeling more grumpy or stressed. Not getting enough sleep also can lead to serious problems, including injury, accidents, anxiety, and depression.  What might cause poor sleeping?  Many things can cause sleep problems, including:  Changes to your sleep schedule.  Stress. Stress can be caused by fear about a single event, such as giving a speech. Or you may have ongoing stress, such as worry about work or school.  Depression, anxiety, and other mental or emotional conditions.  Changes in your sleep habits or surroundings. This includes changes that happen where you sleep, such as noise, light, or sleeping in a different bed. It also includes changes in your sleep pattern, such as having jet lag or working a late shift.  Health problems, such as pain, breathing problems, and restless legs syndrome.  Lack of regular exercise.  Using alcohol, nicotine, or caffeine before bed.  How can you help yourself?  Here are some tips that may help you sleep more soundly and wake up feeling more refreshed.  Your sleeping area   Use your bedroom only for sleeping and sex. A bit of light reading may help you fall asleep. But if it doesn't, do your reading elsewhere in the house. Try not to use your TV, computer, smartphone, or tablet while you are in bed.  Be sure your bed is big enough to stretch out comfortably, especially if you have a sleep partner.  Keep your bedroom quiet, dark, and cool. Use curtains, blinds, or a sleep mask to block out light. To block out noise, use  "earplugs, soothing music, or a \"white noise\" machine.  Your evening and bedtime routine   Create a relaxing bedtime routine. You might want to take a warm shower or bath, or listen to soothing music.  Go to bed at the same time every night. And get up at the same time every morning, even if you feel tired.  What to avoid   Limit caffeine (coffee, tea, caffeinated sodas) during the day, and don't have any for at least 6 hours before bedtime.  Avoid drinking alcohol before bedtime. Alcohol can cause you to wake up more often during the night.  Try not to smoke or use tobacco, especially in the evening. Nicotine can keep you awake.  Limit naps during the day, especially close to bedtime.  Avoid lying in bed awake for too long. If you can't fall asleep or if you wake up in the middle of the night and can't get back to sleep within about 20 minutes, get out of bed and go to another room until you feel sleepy.  Avoid taking medicine right before bed that may keep you awake or make you feel hyper or energized. Your doctor can tell you if your medicine may do this and if you can take it earlier in the day.  If you can't sleep   Imagine yourself in a peaceful, pleasant scene. Focus on the details and feelings of being in a place that is relaxing.  Get up and do a quiet or boring activity until you feel sleepy.  Avoid drinking any liquids before going to bed to help prevent waking up often to use the bathroom.  Where can you learn more?  Go to https://www.[employer].net/patiented  Enter J942 in the search box to learn more about \"Learning About Sleeping Well.\"  Current as of: July 31, 2024  Content Version: 14.5 2024-2025 [employer].   Care instructions adapted under license by your healthcare professional. If you have questions about a medical condition or this instruction, always ask your healthcare professional. [employer] disclaims any warranty or liability for your use of this information.     "

## 2025-07-29 ENCOUNTER — PATIENT OUTREACH (OUTPATIENT)
Dept: CARE COORDINATION | Facility: CLINIC | Age: 70
End: 2025-07-29
Payer: COMMERCIAL

## (undated) DEVICE — LINEN GOWN XLG 5407

## (undated) DEVICE — PREP POVIDONE IODINE SOLUTION 10% 4OZ

## (undated) DEVICE — ESU CORD BIPOLAR GREEN 10-4000

## (undated) DEVICE — SUCTION MANIFOLD NEPTUNE 2 SYS 1 PORT 702-025-000

## (undated) DEVICE — TEST TUBE W/SCREW CAP 17361

## (undated) DEVICE — LINEN TOWEL PACK X5 5464

## (undated) DEVICE — SUCTION CATH AIRLIFE TRI-FLO W/CONTROL PORT 14FR  T60C

## (undated) DEVICE — TUBING SUCTION 12"X1/4" N612

## (undated) DEVICE — ENDO BITE BLOCK ADULT OMNI-BLOC

## (undated) DEVICE — DRSG ADAPTIC 3X3" 6112

## (undated) DEVICE — TUBE FEEDING 05FR 20" 461412

## (undated) DEVICE — KIT ENDO FIRST STEP DISINFECTANT 200ML W/POUCH EP-4

## (undated) DEVICE — PREP POVIDONE IODINE SCRUB 7.5% 4OZ APL82212

## (undated) DEVICE — GOWN IMPERVIOUS BREATHABLE SMART XLG 89045

## (undated) DEVICE — PREP SKIN SCRUB TRAY 4461A

## (undated) DEVICE — SPECIMEN CONTAINER 3OZ W/FORMALIN 59901

## (undated) DEVICE — ATTACHMENT CAP DISTAL REVEAL 13.4MM BX00711773

## (undated) DEVICE — KIT ENDO TURNOVER/PROCEDURE CARRY-ON 101822

## (undated) DEVICE — CAST PLASTER SPLINT 3X15" 7393

## (undated) DEVICE — BNDG KLING 2" 2231

## (undated) DEVICE — ESU ELEC BLADE 2.75" COATED/INSULATED E1455

## (undated) DEVICE — SOL WATER IRRIG 1000ML BOTTLE 2F7114

## (undated) DEVICE — APPLICATOR COTTON TIP 6"X2 STERILE LF 6012

## (undated) DEVICE — CAST PADDING 4" STERILE 9044S

## (undated) DEVICE — DRSG GAUZE 4X4" TRAY 6939

## (undated) DEVICE — GOWN IMPERVIOUS 2XL BLUE

## (undated) DEVICE — NDL 30GA 0.5" 305106

## (undated) DEVICE — LINEN TOWEL PACK X6 WHITE 5487

## (undated) DEVICE — LABEL YELLOW TIME OUT CUSTOM SBA15TOFHB

## (undated) DEVICE — SOL WATER IRRIG 500ML BOTTLE 2F7113

## (undated) DEVICE — SPONGE RAY-TEC 4X8" 7318

## (undated) DEVICE — CAST PADDING 3" UNSTERILE 9043

## (undated) DEVICE — SU ETHILON 5-0 PC-3 18" 1865G

## (undated) DEVICE — TUBING SUCTION MEDI-VAC 1/4"X20' N620A

## (undated) DEVICE — ESU GROUND PAD ADULT W/CORD E7507

## (undated) DEVICE — SYR 03ML SLIP TIP W/O NDL LATEX FREE 309656

## (undated) DEVICE — Device

## (undated) DEVICE — SYR 30ML SLIP TIP W/O NDL 302833

## (undated) DEVICE — DRAPE SHEET MED 44X70" 9355

## (undated) DEVICE — BNDG ELASTIC 4"X5YDS UNSTERILE 6611-40

## (undated) DEVICE — DRSG ADAPTIC 3X16"  6114

## (undated) DEVICE — ESU ELEC NDL 1" COATED/INSULATED E1465

## (undated) DEVICE — GLOVE EXAM NITRILE LG PF LATEX FREE 5064

## (undated) DEVICE — ENDO FORCEP BX CAPTURA PRO SPIKE G50696

## (undated) RX ORDER — FENTANYL CITRATE 50 UG/ML
INJECTION, SOLUTION INTRAMUSCULAR; INTRAVENOUS
Status: DISPENSED
Start: 2023-11-20

## (undated) RX ORDER — DEXAMETHASONE SODIUM PHOSPHATE 4 MG/ML
INJECTION, SOLUTION INTRA-ARTICULAR; INTRALESIONAL; INTRAMUSCULAR; INTRAVENOUS; SOFT TISSUE
Status: DISPENSED
Start: 2019-07-09

## (undated) RX ORDER — BUPIVACAINE HYDROCHLORIDE 5 MG/ML
INJECTION, SOLUTION EPIDURAL; INTRACAUDAL
Status: DISPENSED
Start: 2019-07-09

## (undated) RX ORDER — LIDOCAINE HYDROCHLORIDE 20 MG/ML
INJECTION, SOLUTION EPIDURAL; INFILTRATION; INTRACAUDAL; PERINEURAL
Status: DISPENSED
Start: 2019-07-09

## (undated) RX ORDER — FENTANYL CITRATE 50 UG/ML
INJECTION, SOLUTION INTRAMUSCULAR; INTRAVENOUS
Status: DISPENSED
Start: 2019-07-09

## (undated) RX ORDER — ONDANSETRON 2 MG/ML
INJECTION INTRAMUSCULAR; INTRAVENOUS
Status: DISPENSED
Start: 2019-07-09

## (undated) RX ORDER — CLINDAMYCIN PHOSPHATE 900 MG/50ML
INJECTION, SOLUTION INTRAVENOUS
Status: DISPENSED
Start: 2019-07-09